# Patient Record
Sex: FEMALE | Race: BLACK OR AFRICAN AMERICAN | Employment: OTHER | URBAN - METROPOLITAN AREA
[De-identification: names, ages, dates, MRNs, and addresses within clinical notes are randomized per-mention and may not be internally consistent; named-entity substitution may affect disease eponyms.]

---

## 2018-01-24 ENCOUNTER — APPOINTMENT (OUTPATIENT)
Dept: GENERAL RADIOLOGY | Facility: HOSPITAL | Age: 63
DRG: 207 | End: 2018-01-24
Attending: INTERNAL MEDICINE
Payer: MEDICARE

## 2018-01-24 ENCOUNTER — APPOINTMENT (OUTPATIENT)
Dept: GENERAL RADIOLOGY | Facility: HOSPITAL | Age: 63
DRG: 207 | End: 2018-01-24
Payer: MEDICARE

## 2018-01-24 ENCOUNTER — HOSPITAL ENCOUNTER (INPATIENT)
Facility: HOSPITAL | Age: 63
LOS: 8 days | Discharge: NURSING, REHAB OR LONG-TERM CARE | DRG: 207 | End: 2018-02-01
Attending: EMERGENCY MEDICINE | Admitting: HOSPITALIST
Payer: MEDICARE

## 2018-01-24 ENCOUNTER — APPOINTMENT (OUTPATIENT)
Dept: CT IMAGING | Facility: HOSPITAL | Age: 63
DRG: 207 | End: 2018-01-24
Attending: INTERNAL MEDICINE
Payer: MEDICARE

## 2018-01-24 ENCOUNTER — APPOINTMENT (OUTPATIENT)
Dept: GENERAL RADIOLOGY | Facility: HOSPITAL | Age: 63
DRG: 207 | End: 2018-01-24
Attending: NURSE PRACTITIONER
Payer: MEDICARE

## 2018-01-24 ENCOUNTER — APPOINTMENT (OUTPATIENT)
Dept: GENERAL RADIOLOGY | Facility: HOSPITAL | Age: 63
DRG: 207 | End: 2018-01-24
Attending: EMERGENCY MEDICINE
Payer: MEDICARE

## 2018-01-24 DIAGNOSIS — J44.1 COPD EXACERBATION (HCC): Primary | ICD-10-CM

## 2018-01-24 DIAGNOSIS — R00.0 SINUS TACHYCARDIA: ICD-10-CM

## 2018-01-24 DIAGNOSIS — J11.1 INFLUENZA: ICD-10-CM

## 2018-01-24 PROBLEM — E87.1 HYPONATREMIA: Status: ACTIVE | Noted: 2018-01-24

## 2018-01-24 PROBLEM — E87.2 RESPIRATORY ACIDOSIS: Status: ACTIVE | Noted: 2018-01-24

## 2018-01-24 LAB
ADENOVIRUS PCR:: NEGATIVE
ALBUMIN SERPL-MCNC: 4 G/DL (ref 3.5–4.8)
ALLENS TEST: POSITIVE
ALP LIVER SERPL-CCNC: 64 U/L (ref 46–118)
ALT SERPL-CCNC: 27 U/L (ref 14–54)
APTT PPP: 20.2 SECONDS (ref 25–34)
ARTERIAL BLD GAS O2 SATURATION: 96 % (ref 92–100)
ARTERIAL BLD GAS O2 SATURATION: 97 % (ref 92–100)
ARTERIAL BLD GAS O2 SATURATION: 98 % (ref 92–100)
ARTERIAL BLOOD GAS BASE EXCESS: -0.8
ARTERIAL BLOOD GAS BASE EXCESS: -1.3
ARTERIAL BLOOD GAS BASE EXCESS: 0.8
ARTERIAL BLOOD GAS HCO3: 25 MEQ/L (ref 22–26)
ARTERIAL BLOOD GAS HCO3: 27 MEQ/L (ref 22–26)
ARTERIAL BLOOD GAS HCO3: 27.6 MEQ/L (ref 22–26)
ARTERIAL BLOOD GAS PCO2: 46 MM HG (ref 35–45)
ARTERIAL BLOOD GAS PCO2: 49 MM HG (ref 35–45)
ARTERIAL BLOOD GAS PCO2: 65 MM HG (ref 35–45)
ARTERIAL BLOOD GAS PH: 7.25 (ref 7.35–7.45)
ARTERIAL BLOOD GAS PH: 7.36 (ref 7.35–7.45)
ARTERIAL BLOOD GAS PH: 7.36 (ref 7.35–7.45)
ARTERIAL BLOOD GAS PO2: 118 MM HG (ref 80–105)
ARTERIAL BLOOD GAS PO2: 147 MM HG (ref 80–105)
ARTERIAL BLOOD GAS PO2: >574 MM HG (ref 80–105)
AST SERPL-CCNC: 29 U/L (ref 15–41)
ATRIAL RATE: 158 BPM
B PERT DNA SPEC QL NAA+PROBE: NEGATIVE
BASOPHILS # BLD AUTO: 0.02 X10(3) UL (ref 0–0.1)
BASOPHILS NFR BLD AUTO: 0.2 %
BILIRUB SERPL-MCNC: 0.5 MG/DL (ref 0.1–2)
BUN BLD-MCNC: 18 MG/DL (ref 8–20)
C PNEUM DNA SPEC QL NAA+PROBE: NEGATIVE
CALCIUM BLD-MCNC: 8.7 MG/DL (ref 8.3–10.3)
CALCULATED O2 SATURATION: 100 % (ref 92–100)
CALCULATED O2 SATURATION: 98 % (ref 92–100)
CALCULATED O2 SATURATION: 99 % (ref 92–100)
CARBOXYHEMOGLOBIN: 0.8 % SAT (ref 0–3)
CARBOXYHEMOGLOBIN: 0.8 % SAT (ref 0–3)
CARBOXYHEMOGLOBIN: 0.9 % SAT (ref 0–3)
CHLORIDE: 99 MMOL/L (ref 101–111)
CO2: 28 MMOL/L (ref 22–32)
CORONAVIRUS 229E PCR:: NEGATIVE
CORONAVIRUS HKU1 PCR:: NEGATIVE
CORONAVIRUS NL63 PCR:: NEGATIVE
CORONAVIRUS OC43 PCR:: NEGATIVE
CREAT BLD-MCNC: 0.96 MG/DL (ref 0.55–1.02)
EOSINOPHIL # BLD AUTO: 0.01 X10(3) UL (ref 0–0.3)
EOSINOPHIL NFR BLD AUTO: 0.1 %
ERYTHROCYTE [DISTWIDTH] IN BLOOD BY AUTOMATED COUNT: 13.9 % (ref 11.5–16)
EXPIRATORY PRESSURE: 5 CM H2O
EXPIRATORY PRESSURE: 6 CM H2O
FIO2: 100 %
FIO2: 40 %
FIO2: 40 %
FLUAV H3 RNA SPEC QL NAA+PROBE: POSITIVE
FLUBV RNA SPEC QL NAA+PROBE: NEGATIVE
GLUCOSE BLD-MCNC: 113 MG/DL (ref 70–99)
GLUCOSE BLD-MCNC: 161 MG/DL (ref 65–99)
GLUCOSE BLD-MCNC: 165 MG/DL (ref 65–99)
HCT VFR BLD AUTO: 44 % (ref 34–50)
HGB BLD-MCNC: 14.9 G/DL (ref 12–16)
IMMATURE GRANULOCYTE COUNT: 0.13 X10(3) UL (ref 0–1)
IMMATURE GRANULOCYTE RATIO %: 1.1 %
INR BLD: 1.11 (ref 0.89–1.11)
INSP PRESSURE: 12 CM H2O
INSP PRESSURE: 12 CM H2O
IONIZED CALCIUM: 1.2 MMOL/L (ref 1.12–1.32)
IONIZED CALCIUM: 1.22 MMOL/L (ref 1.12–1.32)
LACTIC ACID ARTERIAL: 3.5 MMOL/L (ref 0.5–2)
LACTIC ACID ARTERIAL: 3.7 MMOL/L (ref 0.5–2)
LACTIC ACID ARTERIAL: <1.3 MMOL/L (ref 0.5–2)
LACTIC ACID: 1.1 MMOL/L (ref 0.5–2)
LACTIC ACID: 3 MMOL/L (ref 0.5–2)
LACTIC ACID: 4 MMOL/L (ref 0.5–2)
LIPASE: 75 U/L (ref 73–393)
LYMPHOCYTES # BLD AUTO: 0.42 X10(3) UL (ref 0.9–4)
LYMPHOCYTES NFR BLD AUTO: 3.5 %
M PROTEIN MFR SERPL ELPH: 7.8 G/DL (ref 6.1–8.3)
MCH RBC QN AUTO: 31 PG (ref 27–33.2)
MCHC RBC AUTO-ENTMCNC: 33.9 G/DL (ref 31–37)
MCV RBC AUTO: 91.5 FL (ref 81–100)
METAPNEUMOVIRUS PCR:: NEGATIVE
METHEMOGLOBIN: 0.7 % SAT (ref 0.4–1.5)
METHEMOGLOBIN: 0.7 % SAT (ref 0.4–1.5)
METHEMOGLOBIN: 0.8 % SAT (ref 0.4–1.5)
MONOCYTES # BLD AUTO: 0.68 X10(3) UL (ref 0.1–0.6)
MONOCYTES NFR BLD AUTO: 5.6 %
MYCOPLASMA PNEUMONIA PCR:: NEGATIVE
NEUTROPHIL ABS PRELIM: 10.91 X10 (3) UL (ref 1.3–6.7)
NEUTROPHILS # BLD AUTO: 10.91 X10(3) UL (ref 1.3–6.7)
NEUTROPHILS NFR BLD AUTO: 89.5 %
P AXIS: 74 DEGREES
P-R INTERVAL: 118 MS
P/F RATIO: 711 MMHG
PARAINFLUENZA 1 PCR:: NEGATIVE
PARAINFLUENZA 2 PCR:: NEGATIVE
PARAINFLUENZA 3 PCR:: NEGATIVE
PARAINFLUENZA 4 PCR:: NEGATIVE
PATIENT TEMPERATURE: 98 F
PATIENT TEMPERATURE: 98.3 F
PATIENT TEMPERATURE: 98.3 F
PEEP: 5 CM H2O
PLATELET # BLD AUTO: 296 10(3)UL (ref 150–450)
POTASSIUM BLOOD GAS: 4.1 MMOL/L (ref 3.6–5.1)
POTASSIUM BLOOD GAS: 4.5 MMOL/L (ref 3.6–5.1)
POTASSIUM SERPL-SCNC: 4 MMOL/L (ref 3.6–5.1)
PRO-BETA NATRIURETIC PEPTIDE: 60 PG/ML (ref ?–125)
PSA SERPL DL<=0.01 NG/ML-MCNC: 14.3 SECONDS (ref 12–14.3)
Q-T INTERVAL: 244 MS
QRS DURATION: 70 MS
QTC CALCULATION (BEZET): 395 MS
R AXIS: 66 DEGREES
RBC # BLD AUTO: 4.81 X10(6)UL (ref 3.8–5.1)
RED CELL DISTRIBUTION WIDTH-SD: 46.5 FL (ref 35.1–46.3)
RHINOVIRUS/ENTERO PCR:: NEGATIVE
RSV RNA SPEC QL NAA+PROBE: NEGATIVE
SODIUM BLOOD GAS: 138 MMOL/L (ref 136–144)
SODIUM BLOOD GAS: 140 MMOL/L (ref 136–144)
SODIUM SERPL-SCNC: 135 MMOL/L (ref 136–144)
T AXIS: 70 DEGREES
TIDAL VOLUME: 550 ML
TOTAL HEMOGLOBIN: 13.3 G/DL (ref 11.7–16)
TOTAL HEMOGLOBIN: 14.5 G/DL (ref 11.7–16)
TOTAL HEMOGLOBIN: 14.7 G/DL (ref 11.7–16)
TRIGL SERPL-MCNC: 93 MG/DL (ref ?–150)
TROPONIN: <0.046 NG/ML (ref ?–0.05)
VENT RATE: 16 /MIN
VENTRICULAR RATE: 158 BPM
WBC # BLD AUTO: 12.2 X10(3) UL (ref 4–13)

## 2018-01-24 PROCEDURE — 71275 CT ANGIOGRAPHY CHEST: CPT | Performed by: INTERNAL MEDICINE

## 2018-01-24 PROCEDURE — 5A1955Z RESPIRATORY VENTILATION, GREATER THAN 96 CONSECUTIVE HOURS: ICD-10-PCS | Performed by: ANESTHESIOLOGY

## 2018-01-24 PROCEDURE — B548ZZA ULTRASONOGRAPHY OF SUPERIOR VENA CAVA, GUIDANCE: ICD-10-PCS | Performed by: HOSPITALIST

## 2018-01-24 PROCEDURE — 0BH18EZ INSERTION OF ENDOTRACHEAL AIRWAY INTO TRACHEA, VIA NATURAL OR ARTIFICIAL OPENING ENDOSCOPIC: ICD-10-PCS | Performed by: ANESTHESIOLOGY

## 2018-01-24 PROCEDURE — 0D9670Z DRAINAGE OF STOMACH WITH DRAINAGE DEVICE, VIA NATURAL OR ARTIFICIAL OPENING: ICD-10-PCS | Performed by: HOSPITALIST

## 2018-01-24 PROCEDURE — 5A09357 ASSISTANCE WITH RESPIRATORY VENTILATION, LESS THAN 24 CONSECUTIVE HOURS, CONTINUOUS POSITIVE AIRWAY PRESSURE: ICD-10-PCS | Performed by: EMERGENCY MEDICINE

## 2018-01-24 PROCEDURE — 99223 1ST HOSP IP/OBS HIGH 75: CPT | Performed by: HOSPITALIST

## 2018-01-24 PROCEDURE — 71045 X-RAY EXAM CHEST 1 VIEW: CPT | Performed by: EMERGENCY MEDICINE

## 2018-01-24 PROCEDURE — 02HV33Z INSERTION OF INFUSION DEVICE INTO SUPERIOR VENA CAVA, PERCUTANEOUS APPROACH: ICD-10-PCS | Performed by: HOSPITALIST

## 2018-01-24 PROCEDURE — 74018 RADEX ABDOMEN 1 VIEW: CPT | Performed by: INTERNAL MEDICINE

## 2018-01-24 PROCEDURE — 74177 CT ABD & PELVIS W/CONTRAST: CPT | Performed by: INTERNAL MEDICINE

## 2018-01-24 PROCEDURE — 71045 X-RAY EXAM CHEST 1 VIEW: CPT | Performed by: INTERNAL MEDICINE

## 2018-01-24 RX ORDER — SODIUM CHLORIDE 9 MG/ML
INJECTION, SOLUTION INTRAVENOUS CONTINUOUS
Status: DISCONTINUED | OUTPATIENT
Start: 2018-01-24 | End: 2018-01-27

## 2018-01-24 RX ORDER — IPRATROPIUM BROMIDE AND ALBUTEROL SULFATE 2.5; .5 MG/3ML; MG/3ML
3 SOLUTION RESPIRATORY (INHALATION)
Status: DISCONTINUED | OUTPATIENT
Start: 2018-01-24 | End: 2018-01-24

## 2018-01-24 RX ORDER — IPRATROPIUM BROMIDE AND ALBUTEROL SULFATE 2.5; .5 MG/3ML; MG/3ML
3 SOLUTION RESPIRATORY (INHALATION) ONCE
Status: COMPLETED | OUTPATIENT
Start: 2018-01-24 | End: 2018-01-24

## 2018-01-24 RX ORDER — ACETAMINOPHEN 325 MG/1
650 TABLET ORAL EVERY 6 HOURS PRN
Status: DISCONTINUED | OUTPATIENT
Start: 2018-01-24 | End: 2018-01-29

## 2018-01-24 RX ORDER — SODIUM CHLORIDE 9 MG/ML
INJECTION, SOLUTION INTRAVENOUS ONCE
Status: COMPLETED | OUTPATIENT
Start: 2018-01-24 | End: 2018-01-24

## 2018-01-24 RX ORDER — ONDANSETRON 2 MG/ML
4 INJECTION INTRAMUSCULAR; INTRAVENOUS EVERY 4 HOURS PRN
Status: DISCONTINUED | OUTPATIENT
Start: 2018-01-24 | End: 2018-01-28 | Stop reason: ALTCHOICE

## 2018-01-24 RX ORDER — MIDAZOLAM HYDROCHLORIDE 1 MG/ML
INJECTION INTRAMUSCULAR; INTRAVENOUS
Status: COMPLETED
Start: 2018-01-24 | End: 2018-01-24

## 2018-01-24 RX ORDER — CHLORHEXIDINE GLUCONATE 0.12 MG/ML
15 RINSE ORAL
Status: DISCONTINUED | OUTPATIENT
Start: 2018-01-24 | End: 2018-01-30

## 2018-01-24 RX ORDER — SODIUM CHLORIDE 0.9 % (FLUSH) 0.9 %
10 SYRINGE (ML) INJECTION AS NEEDED
Status: DISCONTINUED | OUTPATIENT
Start: 2018-01-24 | End: 2018-02-01

## 2018-01-24 RX ORDER — METHYLPREDNISOLONE SODIUM SUCCINATE 125 MG/2ML
125 INJECTION, POWDER, LYOPHILIZED, FOR SOLUTION INTRAMUSCULAR; INTRAVENOUS ONCE
Status: COMPLETED | OUTPATIENT
Start: 2018-01-24 | End: 2018-01-24

## 2018-01-24 RX ORDER — POLYETHYLENE GLYCOL 3350 17 G/17G
17 POWDER, FOR SOLUTION ORAL DAILY PRN
Status: DISCONTINUED | OUTPATIENT
Start: 2018-01-24 | End: 2018-01-30

## 2018-01-24 RX ORDER — GABAPENTIN 100 MG/1
100 CAPSULE ORAL 3 TIMES DAILY
Status: DISCONTINUED | OUTPATIENT
Start: 2018-01-24 | End: 2018-02-01

## 2018-01-24 RX ORDER — ONDANSETRON 2 MG/ML
4 INJECTION INTRAMUSCULAR; INTRAVENOUS EVERY 6 HOURS PRN
Status: DISCONTINUED | OUTPATIENT
Start: 2018-01-24 | End: 2018-02-01

## 2018-01-24 RX ORDER — SODIUM PHOSPHATE, DIBASIC AND SODIUM PHOSPHATE, MONOBASIC 7; 19 G/133ML; G/133ML
1 ENEMA RECTAL ONCE AS NEEDED
Status: DISCONTINUED | OUTPATIENT
Start: 2018-01-24 | End: 2018-01-30

## 2018-01-24 RX ORDER — LORAZEPAM 2 MG/ML
INJECTION INTRAMUSCULAR
Status: COMPLETED
Start: 2018-01-24 | End: 2018-01-24

## 2018-01-24 RX ORDER — MORPHINE SULFATE 2 MG/ML
4 INJECTION, SOLUTION INTRAMUSCULAR; INTRAVENOUS ONCE
Status: COMPLETED | OUTPATIENT
Start: 2018-01-24 | End: 2018-01-24

## 2018-01-24 RX ORDER — METOPROLOL TARTRATE 5 MG/5ML
5 INJECTION INTRAVENOUS ONCE
Status: COMPLETED | OUTPATIENT
Start: 2018-01-24 | End: 2018-01-24

## 2018-01-24 RX ORDER — AMLODIPINE BESYLATE 5 MG/1
10 TABLET ORAL DAILY
Status: DISCONTINUED | OUTPATIENT
Start: 2018-01-24 | End: 2018-02-01

## 2018-01-24 RX ORDER — MIDAZOLAM HYDROCHLORIDE 1 MG/ML
INJECTION INTRAMUSCULAR; INTRAVENOUS
Status: DISPENSED
Start: 2018-01-24 | End: 2018-01-25

## 2018-01-24 RX ORDER — MIDAZOLAM HYDROCHLORIDE 1 MG/ML
1 INJECTION INTRAMUSCULAR; INTRAVENOUS ONCE
Status: COMPLETED | OUTPATIENT
Start: 2018-01-24 | End: 2018-01-24

## 2018-01-24 RX ORDER — BISACODYL 10 MG
10 SUPPOSITORY, RECTAL RECTAL
Status: DISCONTINUED | OUTPATIENT
Start: 2018-01-24 | End: 2018-01-24

## 2018-01-24 RX ORDER — IPRATROPIUM BROMIDE AND ALBUTEROL SULFATE 2.5; .5 MG/3ML; MG/3ML
SOLUTION RESPIRATORY (INHALATION)
Status: COMPLETED
Start: 2018-01-24 | End: 2018-01-24

## 2018-01-24 RX ORDER — BISACODYL 10 MG
10 SUPPOSITORY, RECTAL RECTAL
Status: DISCONTINUED | OUTPATIENT
Start: 2018-01-24 | End: 2018-02-01

## 2018-01-24 RX ORDER — IPRATROPIUM BROMIDE AND ALBUTEROL SULFATE 2.5; .5 MG/3ML; MG/3ML
3 SOLUTION RESPIRATORY (INHALATION)
Status: DISCONTINUED | OUTPATIENT
Start: 2018-01-24 | End: 2018-01-27

## 2018-01-24 RX ORDER — METHYLPREDNISOLONE SODIUM SUCCINATE 125 MG/2ML
80 INJECTION, POWDER, LYOPHILIZED, FOR SOLUTION INTRAMUSCULAR; INTRAVENOUS EVERY 8 HOURS
Status: DISCONTINUED | OUTPATIENT
Start: 2018-01-24 | End: 2018-01-31

## 2018-01-24 RX ORDER — DOCUSATE SODIUM 100 MG/1
100 CAPSULE, LIQUID FILLED ORAL 2 TIMES DAILY
Status: DISCONTINUED | OUTPATIENT
Start: 2018-01-24 | End: 2018-01-25

## 2018-01-24 RX ORDER — ACETAMINOPHEN 325 MG/1
650 TABLET ORAL EVERY 6 HOURS PRN
Status: DISCONTINUED | OUTPATIENT
Start: 2018-01-24 | End: 2018-01-28

## 2018-01-24 RX ORDER — ALBUTEROL SULFATE 2.5 MG/3ML
SOLUTION RESPIRATORY (INHALATION)
Status: COMPLETED
Start: 2018-01-24 | End: 2018-01-24

## 2018-01-24 RX ORDER — OSELTAMIVIR PHOSPHATE 6 MG/ML
75 FOR SUSPENSION ORAL EVERY 12 HOURS
Status: DISCONTINUED | OUTPATIENT
Start: 2018-01-24 | End: 2018-01-25

## 2018-01-24 RX ORDER — OSELTAMIVIR PHOSPHATE 75 MG/1
75 CAPSULE ORAL 2 TIMES DAILY
Status: DISCONTINUED | OUTPATIENT
Start: 2018-01-24 | End: 2018-01-24

## 2018-01-24 RX ORDER — ETOMIDATE 2 MG/ML
INJECTION INTRAVENOUS
Status: COMPLETED
Start: 2018-01-24 | End: 2018-01-24

## 2018-01-24 RX ORDER — IPRATROPIUM BROMIDE AND ALBUTEROL SULFATE 2.5; .5 MG/3ML; MG/3ML
3 SOLUTION RESPIRATORY (INHALATION) EVERY 4 HOURS
Status: DISCONTINUED | OUTPATIENT
Start: 2018-01-24 | End: 2018-01-24

## 2018-01-24 RX ORDER — SODIUM CHLORIDE 9 MG/ML
INJECTION, SOLUTION INTRAVENOUS CONTINUOUS
Status: ACTIVE | OUTPATIENT
Start: 2018-01-24 | End: 2018-01-24

## 2018-01-24 RX ORDER — ACETAMINOPHEN 650 MG/1
650 SUPPOSITORY RECTAL EVERY 6 HOURS PRN
Status: DISCONTINUED | OUTPATIENT
Start: 2018-01-24 | End: 2018-01-29

## 2018-01-24 RX ORDER — SODIUM PHOSPHATE, DIBASIC AND SODIUM PHOSPHATE, MONOBASIC 7; 19 G/133ML; G/133ML
1 ENEMA RECTAL ONCE AS NEEDED
Status: DISCONTINUED | OUTPATIENT
Start: 2018-01-24 | End: 2018-01-24

## 2018-01-24 RX ORDER — DOCUSATE SODIUM 100 MG/1
100 CAPSULE, LIQUID FILLED ORAL 2 TIMES DAILY
Status: DISCONTINUED | OUTPATIENT
Start: 2018-01-24 | End: 2018-01-24

## 2018-01-24 RX ORDER — MIDAZOLAM HYDROCHLORIDE 1 MG/ML
2 INJECTION INTRAMUSCULAR; INTRAVENOUS ONCE
Status: COMPLETED | OUTPATIENT
Start: 2018-01-24 | End: 2018-01-24

## 2018-01-24 RX ORDER — POLYETHYLENE GLYCOL 3350 17 G/17G
17 POWDER, FOR SOLUTION ORAL DAILY PRN
Status: DISCONTINUED | OUTPATIENT
Start: 2018-01-24 | End: 2018-01-24

## 2018-01-24 RX ORDER — ENOXAPARIN SODIUM 100 MG/ML
40 INJECTION SUBCUTANEOUS DAILY
Status: DISCONTINUED | OUTPATIENT
Start: 2018-01-24 | End: 2018-02-01

## 2018-01-24 RX ORDER — ACETAMINOPHEN 160 MG/5ML
650 SOLUTION ORAL EVERY 6 HOURS PRN
Status: DISCONTINUED | OUTPATIENT
Start: 2018-01-24 | End: 2018-01-29

## 2018-01-24 RX ORDER — PANTOPRAZOLE SODIUM 40 MG/1
40 TABLET, DELAYED RELEASE ORAL
Status: DISCONTINUED | OUTPATIENT
Start: 2018-01-25 | End: 2018-01-25

## 2018-01-24 RX ORDER — ASPIRIN 325 MG
325 TABLET, DELAYED RELEASE (ENTERIC COATED) ORAL DAILY
Status: DISCONTINUED | OUTPATIENT
Start: 2018-01-24 | End: 2018-01-28

## 2018-01-24 RX ORDER — LISINOPRIL 40 MG/1
40 TABLET ORAL DAILY
Status: DISCONTINUED | OUTPATIENT
Start: 2018-01-24 | End: 2018-02-01

## 2018-01-24 RX ORDER — MIDAZOLAM HYDROCHLORIDE 1 MG/ML
2 INJECTION INTRAMUSCULAR; INTRAVENOUS EVERY 5 MIN PRN
Status: DISCONTINUED | OUTPATIENT
Start: 2018-01-24 | End: 2018-01-30

## 2018-01-24 RX ORDER — MONTELUKAST SODIUM 10 MG/1
10 TABLET ORAL NIGHTLY
Status: DISCONTINUED | OUTPATIENT
Start: 2018-01-24 | End: 2018-02-01

## 2018-01-24 NOTE — ED PROVIDER NOTES
Patient Seen in: [facility] Emergency Department    History   Patient presents with:  Dyspnea BARBARA SOB (respiratory)    Stated Complaint: resp distress    HPI    Patient is a 28-year-old female, with history of COPD, presenting for evaluation of coug trauma. Extraocular muscles are intact. Pupils are equal and reactive to light. Oropharynx is pink and moist.  NECK: Neck is supple and nontender. The trachea is midline. LUNGS: Diminished breath sounds, bilaterally, expiratory wheezing throughout.   Tach Absolute 10.91 (*)     Lymphocyte Absolute 0.42 (*)     Monocyte Absolute 0.68 (*)     All other components within normal limits   LACTIC ACID, PLASMA - Normal   PRO BETA NATRIURETIC PEPTIDE - Normal   TROPONIN I - Normal   PROTHROMBIN TIME (PT) - Normal evaluation. The patient was attached to a cardiac monitor. An EKG was obtained and reviewed as noted above. Patient was observed while the laboratory and radiology studies were obtained. IV Solu-Medrol and DuoNeb was ordered.   Patient was placed on BiPA

## 2018-01-24 NOTE — PROGRESS NOTES
Arrived to ICU room 467  HR 140s  Lactic 3.0  Dr. Ashley Mo at bedside  Radiology notified to come do KUB    navigator completed with difficulty. Patient easily tires and declined to answer some questions.      Patient states she was at [address] [facility] ye

## 2018-01-24 NOTE — H&P
[facility]  History and Physical     Mya Anderson Patient Status:  Emergency    1955 MRN [id number]   Location [address] [facility] Attending Vic Rivera MD   Hosp Day # 0 PCP Unknown Pcp     Chief Complaint: Resp daily as needed for Muscle spasms. Disp:  Rfl:    DiphenhydrAMINE HCl 50 MG Oral Cap Take 50 mg by mouth nightly as needed for Itching. Disp:  Rfl:    Fluticasone Propionate 50 MCG/ACT Nasal Suspension 2 sprays by Each Nare route daily.    Disp:  Rfl:    Me nondistended. Positive bowel sounds. No rebound, guarding or organomegaly. Neurologic: No focal neurological deficits. CNII-XII grossly intact. Musculoskeletal: Moves all extremities. Extremities: No edema or cyanosis.   Integument: No rashes or lesions

## 2018-01-24 NOTE — PROGRESS NOTES
[facility]    Patients Name: Stacy Vasquez  Attending Physician: Gamaliel Olvera MD  CSN: [id number]   Location:  467/467-A  MRN: [id number]    YOB: 1955  Admission Date: 1/24/2018     Intubation    Called to Intubate Patient.     Ind

## 2018-01-24 NOTE — CONSULTS
Pulmonary / Critical Care H&P/Consult       NAME: [address] [place] [address] Street: [id number] - MRN: [id number] - Age: 63year old - :  1955    Date of Admission: 2018  6:20 AM  Admission Diagnosis: Sinus tachycardia [R00.0]  Influenza [J11.1]  COPD Oral Daily   • Montelukast Sodium  10 mg Oral Nightly   • [START ON 1/25/2018] Pantoprazole Sodium  40 mg Oral QAM AC   • enoxaparin  40 mg Subcutaneous Daily   • ipratropium-albuterol  3 mL Nebulization Q4H   • MethylPREDNISolone Sodium Succ  80 mg [place] Skin:   Skin color, texture, turgor normal, no rashes or lesions         Recent Labs   Lab  01/24/18 0638   WBC  12.2   HGB  14.9   HCT  44.0   PLT  296.0     Recent Labs   Lab  01/24/18 0638   INR  1.11         Recent Labs   Lab  01/24/18 0638   N

## 2018-01-24 NOTE — PLAN OF CARE
Diabetes/Glucose Control    • Glucose maintained within prescribed range Not Progressing        GASTROINTESTINAL - ADULT    • Minimal or absence of nausea and vomiting Not Progressing    • Maintains or returns to baseline bowel function Not Progressing

## 2018-01-24 NOTE — PLAN OF CARE
Received patient from ER sitting up on cart, stating she can't breath. Respirations 30 and labored. Expiratory wheezing noted. sats 84% on 4 liters. Blood pressure 127/104. Call to respiratory and pulmonary.  Plan to transfer to ICU

## 2018-01-24 NOTE — ED NOTES
Patient updated on status. Tachypnea noted. Bi-pap in process. Pt c/o cough, coughing up yellow white mucous. C/o abd pain, RLQ. Will continue to monitor.

## 2018-01-24 NOTE — ED NOTES
Respiratory to come and assess oxygenation. Reduce to NC per MD request. Will continue to monitor. Pt comfortable. Resting.

## 2018-01-25 ENCOUNTER — APPOINTMENT (OUTPATIENT)
Dept: CV DIAGNOSTICS | Facility: HOSPITAL | Age: 63
DRG: 207 | End: 2018-01-25
Attending: NURSE PRACTITIONER
Payer: MEDICARE

## 2018-01-25 ENCOUNTER — APPOINTMENT (OUTPATIENT)
Dept: GENERAL RADIOLOGY | Facility: HOSPITAL | Age: 63
DRG: 207 | End: 2018-01-25
Attending: NURSE PRACTITIONER
Payer: MEDICARE

## 2018-01-25 ENCOUNTER — APPOINTMENT (OUTPATIENT)
Dept: GENERAL RADIOLOGY | Facility: HOSPITAL | Age: 63
DRG: 207 | End: 2018-01-25
Attending: INTERNAL MEDICINE
Payer: MEDICARE

## 2018-01-25 LAB
ARTERIAL BLD GAS O2 SATURATION: 97 % (ref 92–100)
ARTERIAL BLOOD GAS BASE EXCESS: 2
ARTERIAL BLOOD GAS HCO3: 27.7 MEQ/L (ref 22–26)
ARTERIAL BLOOD GAS PCO2: 47 MM HG (ref 35–45)
ARTERIAL BLOOD GAS PH: 7.39 (ref 7.35–7.45)
ARTERIAL BLOOD GAS PO2: 141 MM HG (ref 80–105)
BASOPHILS # BLD AUTO: 0 X10(3) UL (ref 0–0.1)
BASOPHILS NFR BLD AUTO: 0 %
BILIRUB UR QL STRIP.AUTO: NEGATIVE
BUN BLD-MCNC: 21 MG/DL (ref 8–20)
CALCIUM BLD-MCNC: 7.6 MG/DL (ref 8.3–10.3)
CALCULATED O2 SATURATION: 99 % (ref 92–100)
CARBOXYHEMOGLOBIN: 0.7 % SAT (ref 0–3)
CHLORIDE: 106 MMOL/L (ref 101–111)
CO2: 29 MMOL/L (ref 22–32)
COLOR UR AUTO: YELLOW
CREAT BLD-MCNC: 0.83 MG/DL (ref 0.55–1.02)
EOSINOPHIL # BLD AUTO: 0 X10(3) UL (ref 0–0.3)
EOSINOPHIL NFR BLD AUTO: 0 %
ERYTHROCYTE [DISTWIDTH] IN BLOOD BY AUTOMATED COUNT: 13.9 % (ref 11.5–16)
FIO2: 40 %
GLUCOSE BLD-MCNC: 132 MG/DL (ref 65–99)
GLUCOSE BLD-MCNC: 152 MG/DL (ref 65–99)
GLUCOSE BLD-MCNC: 156 MG/DL (ref 70–99)
GLUCOSE BLD-MCNC: 164 MG/DL (ref 65–99)
GLUCOSE BLD-MCNC: 186 MG/DL (ref 65–99)
GLUCOSE UR STRIP.AUTO-MCNC: NEGATIVE MG/DL
HAV IGM SER QL: 2.4 MG/DL (ref 1.7–3)
HCT VFR BLD AUTO: 36.2 % (ref 34–50)
HGB BLD-MCNC: 11.9 G/DL (ref 12–16)
HYALINE CASTS #/AREA URNS AUTO: PRESENT /LPF
IMMATURE GRANULOCYTE COUNT: 0.05 X10(3) UL (ref 0–1)
IMMATURE GRANULOCYTE RATIO %: 0.8 %
KETONES UR STRIP.AUTO-MCNC: NEGATIVE MG/DL
LACTIC ACID: 1.4 MMOL/L (ref 0.5–2)
LEUKOCYTE ESTERASE UR QL STRIP.AUTO: NEGATIVE
LYMPHOCYTES # BLD AUTO: 0.37 X10(3) UL (ref 0.9–4)
LYMPHOCYTES NFR BLD AUTO: 5.9 %
MCH RBC QN AUTO: 30.4 PG (ref 27–33.2)
MCHC RBC AUTO-ENTMCNC: 32.9 G/DL (ref 31–37)
MCV RBC AUTO: 92.3 FL (ref 81–100)
METHEMOGLOBIN: 0.7 % SAT (ref 0.4–1.5)
MONOCYTES # BLD AUTO: 0.41 X10(3) UL (ref 0.1–0.6)
MONOCYTES NFR BLD AUTO: 6.5 %
NEUTROPHIL ABS PRELIM: 5.44 X10 (3) UL (ref 1.3–6.7)
NEUTROPHILS # BLD AUTO: 5.44 X10(3) UL (ref 1.3–6.7)
NEUTROPHILS NFR BLD AUTO: 86.8 %
NITRITE UR QL STRIP.AUTO: NEGATIVE
PATIENT TEMPERATURE: 98.3 F
PEEP: 8 CM H2O
PH UR STRIP.AUTO: 5 [PH] (ref 4.5–8)
PHOSPHATE SERPL-MCNC: 3.5 MG/DL (ref 2.5–4.9)
PLATELET # BLD AUTO: 228 10(3)UL (ref 150–450)
POTASSIUM SERPL-SCNC: 3.4 MMOL/L (ref 3.6–5.1)
PROT UR STRIP.AUTO-MCNC: 30 MG/DL
RBC # BLD AUTO: 3.92 X10(6)UL (ref 3.8–5.1)
RBC UR QL AUTO: NEGATIVE
RED CELL DISTRIBUTION WIDTH-SD: 47.5 FL (ref 35.1–46.3)
SODIUM SERPL-SCNC: 141 MMOL/L (ref 136–144)
SP GR UR STRIP.AUTO: 1.06 (ref 1–1.03)
TIDAL VOLUME: 500 ML
TOTAL HEMOGLOBIN: 12.3 G/DL (ref 11.7–16)
UROBILINOGEN UR STRIP.AUTO-MCNC: <2 MG/DL
VENT RATE: 14 /MIN
WBC # BLD AUTO: 6.3 X10(3) UL (ref 4–13)

## 2018-01-25 PROCEDURE — 99232 SBSQ HOSP IP/OBS MODERATE 35: CPT | Performed by: HOSPITALIST

## 2018-01-25 PROCEDURE — 71045 X-RAY EXAM CHEST 1 VIEW: CPT | Performed by: NURSE PRACTITIONER

## 2018-01-25 PROCEDURE — 93306 TTE W/DOPPLER COMPLETE: CPT | Performed by: NURSE PRACTITIONER

## 2018-01-25 PROCEDURE — 74018 RADEX ABDOMEN 1 VIEW: CPT | Performed by: INTERNAL MEDICINE

## 2018-01-25 RX ORDER — OSELTAMIVIR PHOSPHATE 6 MG/ML
75 FOR SUSPENSION ORAL EVERY 12 HOURS
Status: DISCONTINUED | OUTPATIENT
Start: 2018-01-25 | End: 2018-01-29

## 2018-01-25 RX ORDER — POTASSIUM CHLORIDE 29.8 MG/ML
40 INJECTION INTRAVENOUS ONCE
Status: COMPLETED | OUTPATIENT
Start: 2018-01-25 | End: 2018-01-25

## 2018-01-25 RX ORDER — OSELTAMIVIR PHOSPHATE 6 MG/ML
150 FOR SUSPENSION ORAL EVERY 12 HOURS
Status: DISCONTINUED | OUTPATIENT
Start: 2018-01-25 | End: 2018-01-25

## 2018-01-25 RX ORDER — FUROSEMIDE 10 MG/ML
20 INJECTION INTRAMUSCULAR; INTRAVENOUS ONCE
Status: COMPLETED | OUTPATIENT
Start: 2018-01-25 | End: 2018-01-25

## 2018-01-25 NOTE — PROGRESS NOTES
[address] Patient Status:  Inpatient    1955 MRN [id number]   [facility] 4SW-A Attending Rehan Artis MD   [facility] Day # 1 PCP Unknown Pcp     Critical Care Progress Note     Date of Admission: 2018  6:20 650 mg, 650 mg, Oral, Q6H PRN **OR** acetaminophen (TYLENOL) 160 MG/5ML oral liquid 650 mg, 650 mg, Oral, Q6H PRN **OR** acetaminophen (TYLENOL) 650 MG rectal suppository 650 mg, 650 mg, Rectal, Q6H PRN  •  docusate sodium (COLACE) cap 100 mg, 100 mg, Oral 2157 ml   Output              400 ml   Net             1757 ml         Physical Exam:                          General: intubated, sedated, in NAD                          HEENT: ETT in place                          Lungs: poor air movement, diffuse ex further evaluate. 4.  Emphysematous changes. ASSESSMENT/PLAN:  1. Acute resp failure: secondary to asthma/copd exac in the setting of influenza.   CTA negative for PE and without infiltrates   - s/p intubation 1/24  - continue full mechanical venti

## 2018-01-25 NOTE — PHYSICAL THERAPY NOTE
Physical Therapy    Orders received per [facility] protocol. Pt is not appropriate for PT services yet due to tenuous medical status. Will sign off of case. Please re-order when pt is medically stable and able to participate in sessions.

## 2018-01-25 NOTE — PROGRESS NOTES
Assumed care at [address] [facility]. Sedated. Vented. CTA (-) PE. Coughing against vent. Had issues with desaturation d/t coughing from neb treatment -- APN notified. After coughing/fighting vent a couple times APN stated to keep going up on sedation.   Jaya Mask

## 2018-01-25 NOTE — PROGRESS NOTES
Xr Abdomen (kub) (1 Ap View)  (cpt=74018)    Result Date: 1/24/2018  CONCLUSION:  Gaseous distention of bowel loops within the central abdomen. Findings may represent ileus versus partial small bowel obstruction.     Dictated by: Nancy Moser MD on 1 Wheezes    Peripheral Pulses:  Radial: Right 1+ or Left 1+      Capillary Refill:  <3 Secs    Skin:  Temp/Moisture: Warm and Dry  Color: Pink      Jeison Cabrera  1/24/2018  9:45 PM      0600 addendum:  Pt not getting the full TV on the vent.   More agita

## 2018-01-25 NOTE — RESPIRATORY THERAPY NOTE
Patient on full support vent changes made this am abg's done post. 14/500/40%/+8 tolerating well. Q2 nebs Diminished/Expiratory wheezing. Will continue to monitor.

## 2018-01-25 NOTE — PROGRESS NOTES
[facility]  Progress Note     Spicer Fitting Patient Status:  Inpatient    1955 MRN [id number]   [facility] 4SW-A Attending Deanna Taylor MD   Hosp Day # 1 PCP Unknown Pcp     Chief Complaint: Dyspnea    S: Patient intubate Daily   • gabapentin  100 mg Oral TID   • lisinopril  40 mg Oral Daily   • Montelukast Sodium  10 mg Oral Nightly   • enoxaparin  40 mg Subcutaneous Daily   • MethylPREDNISolone Sodium Succ  80 mg Intravenous Q8H   • ipratropium-albuterol  3 mL Nebulizatio

## 2018-01-25 NOTE — PLAN OF CARE
Received pt on ventilator, propofol and fentanyl infusing. Sedation weaned throughout day as pt's condition allows, per Dr. Demetri Alvarado. After repositioning, pt coughing and fighting ventilator; fentanyl bolus given.     Dr. Grant Green notified this AM of dark br

## 2018-01-25 NOTE — CONSULTS
INFECTIOUS DISEASE CONSULTATION    Nayan Esparza Patient Status:  Inpatient    1955 MRN [id number]   [facility] 4SW-A Attending Deanna Taylor MD   Hosp Day # 1 PCP Unknown Pcp 40 mg, Oral, Daily  •  Montelukast Sodium (SINGULAIR) tab 10 mg, 10 mg, Oral, Nightly  •  acetaminophen (TYLENOL) tab 650 mg, 650 mg, Oral, Q6H PRN  •  Enoxaparin Sodium (LOVENOX) 40 MG/0.4ML injection 40 mg, 40 mg, Subcutaneous, Daily  •  MethylPREDNISolo sodium chloride 0.9 % 50 mL infusion for shock, 0.04 Units/min, Intravenous, Continuous PRN  •  CefTRIAXone Sodium (ROCEPHIN) 2 g in sodium chloride 0.9 % 100 mL IVPB, 2 g, Intravenous, Q24H    Review of Systems:    A comprehensive 10 point review of syste 0700   Specimen: Blood from Blood,peripheral     Blood Culture Result No Growth 1 Day   Respiratory Panel FLU expanded Once [[id number]] (Abnormal) Collected: 01/24/18 0652   Order Status: Completed Lab Status: Final result Updated: 01/24/18 0921   Specimen

## 2018-01-26 ENCOUNTER — APPOINTMENT (OUTPATIENT)
Dept: GENERAL RADIOLOGY | Facility: HOSPITAL | Age: 63
DRG: 207 | End: 2018-01-26
Attending: NURSE PRACTITIONER
Payer: MEDICARE

## 2018-01-26 LAB
ALBUMIN SERPL-MCNC: 2.7 G/DL (ref 3.5–4.8)
ALP LIVER SERPL-CCNC: 35 U/L (ref 46–118)
ALT SERPL-CCNC: 20 U/L (ref 14–54)
ARTERIAL BLD GAS O2 SATURATION: 97 % (ref 92–100)
ARTERIAL BLOOD GAS BASE EXCESS: 2.1
ARTERIAL BLOOD GAS HCO3: 28.1 MEQ/L (ref 22–26)
ARTERIAL BLOOD GAS PCO2: 49 MM HG (ref 35–45)
ARTERIAL BLOOD GAS PH: 7.38 (ref 7.35–7.45)
ARTERIAL BLOOD GAS PO2: 138 MM HG (ref 80–105)
AST SERPL-CCNC: 23 U/L (ref 15–41)
BASOPHILS # BLD AUTO: 0.02 X10(3) UL (ref 0–0.1)
BASOPHILS # BLD AUTO: 0.02 X10(3) UL (ref 0–0.1)
BASOPHILS NFR BLD AUTO: 0.2 %
BASOPHILS NFR BLD AUTO: 0.2 %
BILIRUB SERPL-MCNC: 0.1 MG/DL (ref 0.1–2)
BUN BLD-MCNC: 18 MG/DL (ref 8–20)
CALCIUM BLD-MCNC: 7.5 MG/DL (ref 8.3–10.3)
CALCULATED O2 SATURATION: 99 % (ref 92–100)
CARBOXYHEMOGLOBIN: 0.8 % SAT (ref 0–3)
CHLORIDE: 113 MMOL/L (ref 101–111)
CO2: 28 MMOL/L (ref 22–32)
CREAT BLD-MCNC: 0.64 MG/DL (ref 0.55–1.02)
EOSINOPHIL # BLD AUTO: 0 X10(3) UL (ref 0–0.3)
EOSINOPHIL # BLD AUTO: 0 X10(3) UL (ref 0–0.3)
EOSINOPHIL NFR BLD AUTO: 0 %
EOSINOPHIL NFR BLD AUTO: 0 %
ERYTHROCYTE [DISTWIDTH] IN BLOOD BY AUTOMATED COUNT: 14.5 % (ref 11.5–16)
ERYTHROCYTE [DISTWIDTH] IN BLOOD BY AUTOMATED COUNT: 14.7 % (ref 11.5–16)
FIO2: 40 %
GLUCOSE BLD-MCNC: 111 MG/DL (ref 65–99)
GLUCOSE BLD-MCNC: 114 MG/DL (ref 65–99)
GLUCOSE BLD-MCNC: 135 MG/DL (ref 70–99)
GLUCOSE BLD-MCNC: 148 MG/DL (ref 65–99)
HCT VFR BLD AUTO: 34.1 % (ref 34–50)
HCT VFR BLD AUTO: 34.1 % (ref 34–50)
HGB BLD-MCNC: 11 G/DL (ref 12–16)
HGB BLD-MCNC: 11.2 G/DL (ref 12–16)
IMMATURE GRANULOCYTE COUNT: 0.13 X10(3) UL (ref 0–1)
IMMATURE GRANULOCYTE COUNT: 0.23 X10(3) UL (ref 0–1)
IMMATURE GRANULOCYTE RATIO %: 1.2 %
IMMATURE GRANULOCYTE RATIO %: 1.9 %
LYMPHOCYTES # BLD AUTO: 0.31 X10(3) UL (ref 0.9–4)
LYMPHOCYTES # BLD AUTO: 0.44 X10(3) UL (ref 0.9–4)
LYMPHOCYTES NFR BLD AUTO: 2.9 %
LYMPHOCYTES NFR BLD AUTO: 3.6 %
M PROTEIN MFR SERPL ELPH: 5.4 G/DL (ref 6.1–8.3)
MCH RBC QN AUTO: 30.6 PG (ref 27–33.2)
MCH RBC QN AUTO: 31.5 PG (ref 27–33.2)
MCHC RBC AUTO-ENTMCNC: 32.3 G/DL (ref 31–37)
MCHC RBC AUTO-ENTMCNC: 32.8 G/DL (ref 31–37)
MCV RBC AUTO: 94.7 FL (ref 81–100)
MCV RBC AUTO: 95.8 FL (ref 81–100)
METHEMOGLOBIN: 0.7 % SAT (ref 0.4–1.5)
MONOCYTES # BLD AUTO: 0.79 X10(3) UL (ref 0.1–0.6)
MONOCYTES # BLD AUTO: 1.08 X10(3) UL (ref 0.1–0.6)
MONOCYTES NFR BLD AUTO: 7.3 %
MONOCYTES NFR BLD AUTO: 8.8 %
NEUTROPHIL ABS PRELIM: 10.54 X10 (3) UL (ref 1.3–6.7)
NEUTROPHIL ABS PRELIM: 9.56 X10 (3) UL (ref 1.3–6.7)
NEUTROPHILS # BLD AUTO: 10.54 X10(3) UL (ref 1.3–6.7)
NEUTROPHILS # BLD AUTO: 9.56 X10(3) UL (ref 1.3–6.7)
NEUTROPHILS NFR BLD AUTO: 85.5 %
NEUTROPHILS NFR BLD AUTO: 88.4 %
PATIENT TEMPERATURE: 98 F
PEEP: 8 CM H2O
PLATELET # BLD AUTO: 229 10(3)UL (ref 150–450)
PLATELET # BLD AUTO: 232 10(3)UL (ref 150–450)
POTASSIUM SERPL-SCNC: 4 MMOL/L (ref 3.6–5.1)
POTASSIUM SERPL-SCNC: 4 MMOL/L (ref 3.6–5.1)
RBC # BLD AUTO: 3.56 X10(6)UL (ref 3.8–5.1)
RBC # BLD AUTO: 3.6 X10(6)UL (ref 3.8–5.1)
RED CELL DISTRIBUTION WIDTH-SD: 50 FL (ref 35.1–46.3)
RED CELL DISTRIBUTION WIDTH-SD: 52 FL (ref 35.1–46.3)
SODIUM SERPL-SCNC: 146 MMOL/L (ref 136–144)
TIDAL VOLUME: 500 ML
TOTAL HEMOGLOBIN: 11.8 G/DL (ref 11.7–16)
VENT RATE: 14 /MIN
WBC # BLD AUTO: 10.8 X10(3) UL (ref 4–13)
WBC # BLD AUTO: 12.3 X10(3) UL (ref 4–13)

## 2018-01-26 PROCEDURE — 99232 SBSQ HOSP IP/OBS MODERATE 35: CPT | Performed by: HOSPITALIST

## 2018-01-26 PROCEDURE — 71045 X-RAY EXAM CHEST 1 VIEW: CPT | Performed by: NURSE PRACTITIONER

## 2018-01-26 NOTE — PLAN OF CARE
Received pt this am on ventilator; propofol for sedation. Family at bedside overnight; daughter at bedside this morning; all questions answered.  Pt's   last year d/t unknown illness, was on ventilator; this is causing distress to pt's daught

## 2018-01-26 NOTE — PROGRESS NOTES
[facility]                INFECTIOUS DISEASE PROGRESS NOTE    Tess Frye Patient Status:  Inpatient    1955 MRN [id number]   [facility] 4SW-A Attending Jose Patel MD   Hosp Day # 2 PCP Unknown Pcp     Antibiotics:o result Updated: 01/26/18 0700   Specimen: Blood from Blood,peripheral     Blood Culture Result No Growth 2 Days   Blood Culture FREQ X 2 [[id number]] Collected: 01/24/18 4281   Order Status: Completed Lab Status: Preliminary result Updated: 01/26/18 0700 from [address]     Blood Culture Result No Growth 1 Day         Radiology     cxr 1/26 stable no consolidation    Problem list reviewed:  Patient Active Problem List:     Hypokalemia     Hyperglycemia     Azotemia     Metabolic alkalosis     COPD exace

## 2018-01-26 NOTE — PROGRESS NOTES
[facility]  Progress Note     tte  Patient Status:  Inpatient    1955 MRN [id number]   [facility] 4SW-A Attending Etelvina Ruff MD   Hosp Day # 2 PCP Unknown Pcp     Chief Complaint: Dyspnea    S: Patient remains mg Per NG Tube Q12H   • docusate sodium  100 mg Oral BID   • AmLODIPine Besylate  10 mg Oral Daily   • aspirin  325 mg Oral Daily   • gabapentin  100 mg Oral TID   • lisinopril  40 mg Oral Daily   • Montelukast Sodium  10 mg Oral Nightly   • enoxaparin  40

## 2018-01-26 NOTE — PLAN OF CARE
Assumed care at [address] [place] [address]. Sedated. Titrating sedation (Propofol and fentanyl drips) as tolerated. Vented. Has episodes of coughing and fighting the ventilator. SR/ST.  VSS.  IVF. Vigileo -- goal SVV <13.   Became tachycardic in the 140-150s while turni

## 2018-01-26 NOTE — PROGRESS NOTES
[address] Patient Status:  Inpatient    1955 MRN [id number]   [facility] 4SW-A Attending Vinicio Abdi MD   Hosp Day # 2 PCP Unknown Pcp     Pulm / Critical Care Progress Note     S: remains intubated, sedat 3 completed shifts: In: 1073 [I.V.:3408; NG/GT:120; IV PIGGYBACK:200]  Out: 0523 [Urine:1225; Emesis/NG output:400]  I/O this shift: In: 4242.8 [I.V.:4142.8;  IV PIGGYBACK:100]  Out: 1400 [Urine:700; Emesis/NG output:700]     Physical Exam:   General: int with hypodensitities in bilateral kidneys, UA without evidence of infection to suggest that this represents pyelonephritis  5. ?lesion in right kidney superior pole  -will need MRI renal mass protocol at some point when acute issues resolve   6.  FEN:   -TF

## 2018-01-26 NOTE — DIETARY NOTE
[facility]    NUTRITION INITIAL ASSESSMENT    Pt does not meet malnutrition criteria.       NUTRITION DIAGNOSIS/PROBLEM:    Inadequate oral intake related inability to meet nutrition via oral route as evidenced by ongoing intubation and sedation    NUT edema notes per visual exam.    NUTRITION PRESCRIPTION:  Calories: 2601-6151 calories/day (28-33 calories per kg)  Protein: 72-99 grams protein/day (1.3-1.8 grams protein per kg)  Fluid: ~1 ml/kcal or per MD discretion    MONITOR AND EVALUATE/NUTRITION GOA

## 2018-01-27 ENCOUNTER — APPOINTMENT (OUTPATIENT)
Dept: GENERAL RADIOLOGY | Facility: HOSPITAL | Age: 63
DRG: 207 | End: 2018-01-27
Attending: INTERNAL MEDICINE
Payer: MEDICARE

## 2018-01-27 ENCOUNTER — APPOINTMENT (OUTPATIENT)
Dept: GENERAL RADIOLOGY | Facility: HOSPITAL | Age: 63
DRG: 207 | End: 2018-01-27
Attending: NURSE PRACTITIONER
Payer: MEDICARE

## 2018-01-27 LAB
ARTERIAL BLD GAS O2 SATURATION: 98 % (ref 92–100)
ARTERIAL BLOOD GAS BASE EXCESS: 1.9
ARTERIAL BLOOD GAS HCO3: 28.1 MEQ/L (ref 22–26)
ARTERIAL BLOOD GAS PCO2: 50 MM HG (ref 35–45)
ARTERIAL BLOOD GAS PH: 7.36 (ref 7.35–7.45)
ARTERIAL BLOOD GAS PO2: 168 MM HG (ref 80–105)
BASOPHILS # BLD AUTO: 0.01 X10(3) UL (ref 0–0.1)
BASOPHILS NFR BLD AUTO: 0.1 %
BUN BLD-MCNC: 21 MG/DL (ref 8–20)
CALCIUM BLD-MCNC: 7.5 MG/DL (ref 8.3–10.3)
CALCULATED O2 SATURATION: 99 % (ref 92–100)
CARBOXYHEMOGLOBIN: 0.7 % SAT (ref 0–3)
CHLORIDE: 114 MMOL/L (ref 101–111)
CO2: 28 MMOL/L (ref 22–32)
CREAT BLD-MCNC: 0.51 MG/DL (ref 0.55–1.02)
EOSINOPHIL # BLD AUTO: 0 X10(3) UL (ref 0–0.3)
EOSINOPHIL NFR BLD AUTO: 0 %
ERYTHROCYTE [DISTWIDTH] IN BLOOD BY AUTOMATED COUNT: 14.8 % (ref 11.5–16)
FIO2: 40 %
GLUCOSE BLD-MCNC: 107 MG/DL (ref 65–99)
GLUCOSE BLD-MCNC: 116 MG/DL (ref 65–99)
GLUCOSE BLD-MCNC: 117 MG/DL (ref 65–99)
GLUCOSE BLD-MCNC: 122 MG/DL (ref 65–99)
GLUCOSE BLD-MCNC: 129 MG/DL (ref 65–99)
GLUCOSE BLD-MCNC: 142 MG/DL (ref 70–99)
HAV IGM SER QL: 2.8 MG/DL (ref 1.7–3)
HCT VFR BLD AUTO: 32.6 % (ref 34–50)
HGB BLD-MCNC: 10.8 G/DL (ref 12–16)
IMMATURE GRANULOCYTE COUNT: 0.26 X10(3) UL (ref 0–1)
IMMATURE GRANULOCYTE RATIO %: 2.6 %
LYMPHOCYTES # BLD AUTO: 0.37 X10(3) UL (ref 0.9–4)
LYMPHOCYTES NFR BLD AUTO: 3.7 %
MCH RBC QN AUTO: 30.9 PG (ref 27–33.2)
MCHC RBC AUTO-ENTMCNC: 33.1 G/DL (ref 31–37)
MCV RBC AUTO: 93.4 FL (ref 81–100)
METHEMOGLOBIN: 0.8 % SAT (ref 0.4–1.5)
MONOCYTES # BLD AUTO: 0.54 X10(3) UL (ref 0.1–0.6)
MONOCYTES NFR BLD AUTO: 5.4 %
NEUTROPHIL ABS PRELIM: 8.73 X10 (3) UL (ref 1.3–6.7)
NEUTROPHILS # BLD AUTO: 8.73 X10(3) UL (ref 1.3–6.7)
NEUTROPHILS NFR BLD AUTO: 88.2 %
PATIENT TEMPERATURE: 97.9 F
PEEP: 8 CM H2O
PLATELET # BLD AUTO: 233 10(3)UL (ref 150–450)
POTASSIUM SERPL-SCNC: 4 MMOL/L (ref 3.6–5.1)
RBC # BLD AUTO: 3.49 X10(6)UL (ref 3.8–5.1)
RED CELL DISTRIBUTION WIDTH-SD: 51.3 FL (ref 35.1–46.3)
SODIUM SERPL-SCNC: 148 MMOL/L (ref 136–144)
TIDAL VOLUME: 500 ML
TOTAL HEMOGLOBIN: 11.1 G/DL (ref 11.7–16)
VENT RATE: 14 /MIN
WBC # BLD AUTO: 9.9 X10(3) UL (ref 4–13)

## 2018-01-27 PROCEDURE — 99232 SBSQ HOSP IP/OBS MODERATE 35: CPT | Performed by: HOSPITALIST

## 2018-01-27 PROCEDURE — 71045 X-RAY EXAM CHEST 1 VIEW: CPT | Performed by: NURSE PRACTITIONER

## 2018-01-27 PROCEDURE — 71045 X-RAY EXAM CHEST 1 VIEW: CPT | Performed by: INTERNAL MEDICINE

## 2018-01-27 RX ORDER — IPRATROPIUM BROMIDE AND ALBUTEROL SULFATE 2.5; .5 MG/3ML; MG/3ML
3 SOLUTION RESPIRATORY (INHALATION)
Status: DISCONTINUED | OUTPATIENT
Start: 2018-01-27 | End: 2018-01-28

## 2018-01-27 RX ORDER — DEXTROSE AND SODIUM CHLORIDE 5; .45 G/100ML; G/100ML
INJECTION, SOLUTION INTRAVENOUS CONTINUOUS
Status: DISCONTINUED | OUTPATIENT
Start: 2018-01-27 | End: 2018-01-27

## 2018-01-27 NOTE — PROGRESS NOTES
[facility]  Progress Note     Meliza Gonzalez Patient Status:  Inpatient    1955 MRN [id number]   [facility] 4SW-A Attending Dillon Kirby MD   Hosp Day # 3 PCP Unknown Pcp     Chief Complaint: Dyspnea    S: Patient intubate 6 times per day   • pantoprazole (PROTONIX) IV push  40 mg Intravenous Q24H   • Oseltamivir Phosphate  75 mg Per NG Tube Q12H   • docusate sodium  100 mg Oral BID   • AmLODIPine Besylate  10 mg Oral Daily   • aspirin  325 mg Oral Daily   • gabapentin  100

## 2018-01-27 NOTE — PLAN OF CARE
GASTROINTESTINAL - ADULT    • Maintains or returns to baseline bowel function Not Progressing          Diabetes/Glucose Control    • Glucose maintained within prescribed range Progressing        RESPIRATORY - ADULT    • Achieves optimal ventilation and oxy

## 2018-01-27 NOTE — PROGRESS NOTES
Earlier in shift TF initiated per dietary recommendations and per [facility] plan of TF for nutrition. Bowel sounds present x4, and abdomen appears non-tender to palpation. Most recent follow-up imaging )KUB and CT abd/pelvis) without obstruction.   Patient cont

## 2018-01-27 NOTE — PROGRESS NOTES
Critical Care Progress Note     Assessment / Plan:  1. Acute resp failure: secondary to asthma/copd exac in the setting of influenza. CTA negative for PE and without infiltrates.  S/p intubation 1/24  - continue full mechanical ventilation  - decrease PEEP

## 2018-01-27 NOTE — PLAN OF CARE
Received pt this am on ventilator. Propofol and fentanyl gtts. Attempting to titrate down on propofol as pt will tolerate. ETT advanced today per order by RT, CXR's to confirm, pt tolerating well. Daughter at bedside this morning.      MUSCULOSKELETAL -

## 2018-01-27 NOTE — PROGRESS NOTES
[facility]                INFECTIOUS DISEASE PROGRESS NOTE    Celina Carranza Patient Status:  Inpatient    1955 MRN [id number]   [facility] 4SW-A Attending Brea Clifford MD   Hosp Day # 3 PCP Unknown Pcp     Antibiotics: damir pneumonia on cxr  Complete ceftriaxone with pm dose today  Complete tamiflu x 5d  Weaning per pulmonary  Monitor resp secretions and cxr  Follow temps and wbc    Case and plan d/w staff.  Will follow    Grace Menchaca MD

## 2018-01-28 ENCOUNTER — APPOINTMENT (OUTPATIENT)
Dept: GENERAL RADIOLOGY | Facility: HOSPITAL | Age: 63
DRG: 207 | End: 2018-01-28
Attending: NURSE PRACTITIONER
Payer: MEDICARE

## 2018-01-28 LAB
ARTERIAL BLD GAS O2 SATURATION: 97 % (ref 92–100)
ARTERIAL BLOOD GAS BASE EXCESS: 5.6
ARTERIAL BLOOD GAS HCO3: 31.9 MEQ/L (ref 22–26)
ARTERIAL BLOOD GAS PCO2: 54 MM HG (ref 35–45)
ARTERIAL BLOOD GAS PH: 7.39 (ref 7.35–7.45)
ARTERIAL BLOOD GAS PO2: 141 MM HG (ref 80–105)
BUN BLD-MCNC: 27 MG/DL (ref 8–20)
CALCIUM BLD-MCNC: 8 MG/DL (ref 8.3–10.3)
CALCULATED O2 SATURATION: 99 % (ref 92–100)
CARBOXYHEMOGLOBIN: 0.8 % SAT (ref 0–3)
CHLORIDE: 108 MMOL/L (ref 101–111)
CO2: 33 MMOL/L (ref 22–32)
CREAT BLD-MCNC: 0.61 MG/DL (ref 0.55–1.02)
ERYTHROCYTE [DISTWIDTH] IN BLOOD BY AUTOMATED COUNT: 14.6 % (ref 11.5–16)
FIO2: 40 %
GLUCOSE BLD-MCNC: 104 MG/DL (ref 65–99)
GLUCOSE BLD-MCNC: 122 MG/DL (ref 65–99)
GLUCOSE BLD-MCNC: 132 MG/DL (ref 70–99)
GLUCOSE BLD-MCNC: 149 MG/DL (ref 65–99)
GLUCOSE BLD-MCNC: 162 MG/DL (ref 65–99)
HCT VFR BLD AUTO: 35.5 % (ref 34–50)
HGB BLD-MCNC: 11.3 G/DL (ref 12–16)
MCH RBC QN AUTO: 30.1 PG (ref 27–33.2)
MCHC RBC AUTO-ENTMCNC: 31.8 G/DL (ref 31–37)
MCV RBC AUTO: 94.4 FL (ref 81–100)
METHEMOGLOBIN: 0.7 % SAT (ref 0.4–1.5)
P/F RATIO: 681.4 MMHG
PATIENT TEMPERATURE: 98.1 F
PEEP: 5 CM H2O
PLATELET # BLD AUTO: 207 10(3)UL (ref 150–450)
POTASSIUM SERPL-SCNC: 4.3 MMOL/L (ref 3.6–5.1)
RBC # BLD AUTO: 3.76 X10(6)UL (ref 3.8–5.1)
RED CELL DISTRIBUTION WIDTH-SD: 51.1 FL (ref 35.1–46.3)
SODIUM SERPL-SCNC: 145 MMOL/L (ref 136–144)
TIDAL VOLUME: 500 ML
TOTAL HEMOGLOBIN: 11.8 G/DL (ref 11.7–16)
VENT RATE: 12 /MIN
WBC # BLD AUTO: 10.4 X10(3) UL (ref 4–13)

## 2018-01-28 PROCEDURE — 71045 X-RAY EXAM CHEST 1 VIEW: CPT | Performed by: NURSE PRACTITIONER

## 2018-01-28 PROCEDURE — 99232 SBSQ HOSP IP/OBS MODERATE 35: CPT | Performed by: HOSPITALIST

## 2018-01-28 RX ORDER — DEXMEDETOMIDINE HYDROCHLORIDE 4 UG/ML
INJECTION, SOLUTION INTRAVENOUS CONTINUOUS PRN
Status: DISCONTINUED | OUTPATIENT
Start: 2018-01-28 | End: 2018-02-01

## 2018-01-28 RX ORDER — IPRATROPIUM BROMIDE AND ALBUTEROL SULFATE 2.5; .5 MG/3ML; MG/3ML
3 SOLUTION RESPIRATORY (INHALATION)
Status: DISCONTINUED | OUTPATIENT
Start: 2018-01-28 | End: 2018-01-30

## 2018-01-28 RX ORDER — ASPIRIN 325 MG
325 TABLET ORAL DAILY
Status: DISCONTINUED | OUTPATIENT
Start: 2018-01-29 | End: 2018-02-01

## 2018-01-28 RX ORDER — METOPROLOL TARTRATE 5 MG/5ML
5 INJECTION INTRAVENOUS EVERY 6 HOURS PRN
Status: DISCONTINUED | OUTPATIENT
Start: 2018-01-28 | End: 2018-02-01

## 2018-01-28 RX ORDER — BISACODYL 10 MG
10 SUPPOSITORY, RECTAL RECTAL
Status: DISCONTINUED | OUTPATIENT
Start: 2018-01-28 | End: 2018-01-28

## 2018-01-28 RX ORDER — HYDRALAZINE HYDROCHLORIDE 20 MG/ML
INJECTION INTRAMUSCULAR; INTRAVENOUS
Status: COMPLETED
Start: 2018-01-28 | End: 2018-01-28

## 2018-01-28 RX ORDER — METOPROLOL TARTRATE 5 MG/5ML
INJECTION INTRAVENOUS
Status: COMPLETED
Start: 2018-01-28 | End: 2018-01-28

## 2018-01-28 RX ORDER — POLYETHYLENE GLYCOL 3350 17 G/17G
17 POWDER, FOR SOLUTION ORAL DAILY
Status: DISCONTINUED | OUTPATIENT
Start: 2018-01-28 | End: 2018-02-01

## 2018-01-28 RX ORDER — HYDRALAZINE HYDROCHLORIDE 20 MG/ML
10 INJECTION INTRAMUSCULAR; INTRAVENOUS ONCE
Status: COMPLETED | OUTPATIENT
Start: 2018-01-29 | End: 2018-01-28

## 2018-01-28 RX ORDER — IPRATROPIUM BROMIDE AND ALBUTEROL SULFATE 2.5; .5 MG/3ML; MG/3ML
3 SOLUTION RESPIRATORY (INHALATION)
Status: DISCONTINUED | OUTPATIENT
Start: 2018-01-28 | End: 2018-01-28

## 2018-01-28 NOTE — PROGRESS NOTES
[facility]  Progress Note     Kristyn Morales Patient Status:  Inpatient    1955 MRN [id number]   [facility] 4SW-A Attending Corina Cintron MD   Hosp Day # 4 PCP Unknown Pcp     Chief Complaint: Dyspnea    S: Patient intubate 3 mL Nebulization Q2H   • pantoprazole (PROTONIX) IV push  40 mg Intravenous Q24H   • Oseltamivir Phosphate  75 mg Per NG Tube Q12H   • docusate sodium  100 mg Oral BID   • AmLODIPine Besylate  10 mg Oral Daily   • aspirin  325 mg Oral Daily   • gabapentin

## 2018-01-28 NOTE — PROGRESS NOTES
Critical Care Progress Note     Assessment / Plan:  1. Acute resp failure: secondary to asthma/copd exac in the setting of influenza. CTA negative for PE and without infiltrates.  S/p intubation 1/24  - full vent support  - not ready for SBT  - minimize sed

## 2018-01-29 LAB
ALBUMIN SERPL-MCNC: 2.9 G/DL (ref 3.5–4.8)
ALP LIVER SERPL-CCNC: 41 U/L (ref 46–118)
ALT SERPL-CCNC: 31 U/L (ref 14–54)
ARTERIAL BLD GAS O2 SATURATION: 96 % (ref 92–100)
ARTERIAL BLD GAS O2 SATURATION: 97 % (ref 92–100)
ARTERIAL BLD GAS O2 SATURATION: 97 % (ref 92–100)
ARTERIAL BLOOD GAS BASE EXCESS: 7.9
ARTERIAL BLOOD GAS BASE EXCESS: 8.3
ARTERIAL BLOOD GAS BASE EXCESS: 8.9
ARTERIAL BLOOD GAS HCO3: 34 MEQ/L (ref 22–26)
ARTERIAL BLOOD GAS HCO3: 34.3 MEQ/L (ref 22–26)
ARTERIAL BLOOD GAS HCO3: 34.8 MEQ/L (ref 22–26)
ARTERIAL BLOOD GAS PCO2: 53 MM HG (ref 35–45)
ARTERIAL BLOOD GAS PCO2: 54 MM HG (ref 35–45)
ARTERIAL BLOOD GAS PCO2: 54 MM HG (ref 35–45)
ARTERIAL BLOOD GAS PH: 7.42 (ref 7.35–7.45)
ARTERIAL BLOOD GAS PH: 7.43 (ref 7.35–7.45)
ARTERIAL BLOOD GAS PH: 7.43 (ref 7.35–7.45)
ARTERIAL BLOOD GAS PO2: 121 MM HG (ref 80–105)
ARTERIAL BLOOD GAS PO2: 132 MM HG (ref 80–105)
ARTERIAL BLOOD GAS PO2: 95 MM HG (ref 80–105)
AST SERPL-CCNC: 21 U/L (ref 15–41)
BAND %: 3 %
BASOPHIL % MANUAL: 0 %
BASOPHIL ABSOLUTE MANUAL: 0 X10(3) UL (ref 0–0.1)
BILIRUB SERPL-MCNC: 0.3 MG/DL (ref 0.1–2)
BUN BLD-MCNC: 25 MG/DL (ref 8–20)
CALCIUM BLD-MCNC: 8.2 MG/DL (ref 8.3–10.3)
CALCULATED O2 SATURATION: 98 % (ref 92–100)
CALCULATED O2 SATURATION: 99 % (ref 92–100)
CALCULATED O2 SATURATION: 99 % (ref 92–100)
CARBOXYHEMOGLOBIN: 0.9 % SAT (ref 0–3)
CHLORIDE: 107 MMOL/L (ref 101–111)
CO2: 34 MMOL/L (ref 22–32)
CREAT BLD-MCNC: 0.59 MG/DL (ref 0.55–1.02)
EOSINOPHIL % MANUAL: 0 %
EOSINOPHIL ABSOLUTE MANUAL: 0 X10(3) UL (ref 0–0.3)
ERYTHROCYTE [DISTWIDTH] IN BLOOD BY AUTOMATED COUNT: 14 % (ref 11.5–16)
FIO2: 40 %
GLUCOSE BLD-MCNC: 125 MG/DL (ref 65–99)
GLUCOSE BLD-MCNC: 126 MG/DL (ref 65–99)
GLUCOSE BLD-MCNC: 160 MG/DL (ref 70–99)
GLUCOSE BLD-MCNC: 167 MG/DL (ref 65–99)
HAV IGM SER QL: 2.6 MG/DL (ref 1.7–3)
HCT VFR BLD AUTO: 37.5 % (ref 34–50)
HGB BLD-MCNC: 12.2 G/DL (ref 12–16)
INSPIRATORY TIME: 0.6 %
IONIZED CALCIUM: 1.18 MMOL/L (ref 1.12–1.32)
IONIZED CALCIUM: 1.2 MMOL/L (ref 1.12–1.32)
L/M: 5 L/MIN
L/M: 8 L/MIN
LACTIC ACID ARTERIAL: 1.3 MMOL/L (ref 0.5–2)
LACTIC ACID ARTERIAL: 1.5 MMOL/L (ref 0.5–2)
LYMPHOCYTE % MANUAL: 13 %
LYMPHOCYTE ABSOLUTE MANUAL: 1.53 X10(3) UL (ref 0.9–4)
M PROTEIN MFR SERPL ELPH: 6.3 G/DL (ref 6.1–8.3)
MCH RBC QN AUTO: 30.3 PG (ref 27–33.2)
MCHC RBC AUTO-ENTMCNC: 32.5 G/DL (ref 31–37)
MCV RBC AUTO: 93.1 FL (ref 81–100)
METHEMOGLOBIN: 0.7 % SAT (ref 0.4–1.5)
MONOCYTE % MANUAL: 8 %
MONOCYTE ABSOLUTE MANUAL: 0.94 X10(3) UL (ref 0.1–0.6)
MORPHOLOGY: NORMAL
NEUTROPHIL ABS PRELIM: 9.22 X10 (3) UL (ref 1.3–6.7)
NEUTROPHIL ABSOLUTE MANUAL: 9.32 X10(3) UL (ref 1.3–6.7)
NEUTROPHILS % MANUAL: 76 %
PATIENT TEMPERATURE: 98.1 F
PATIENT TEMPERATURE: 98.8 F
PATIENT TEMPERATURE: 99.1 F
PEEP: 5 CM H2O
PHOSPHATE SERPL-MCNC: 2.8 MG/DL (ref 2.5–4.9)
PLATELET # BLD AUTO: 235 10(3)UL (ref 150–450)
PLATELET MORPHOLOGY: NORMAL
POTASSIUM BLOOD GAS: 4.2 MMOL/L (ref 3.6–5.1)
POTASSIUM BLOOD GAS: 4.4 MMOL/L (ref 3.6–5.1)
POTASSIUM SERPL-SCNC: 4.5 MMOL/L (ref 3.6–5.1)
RBC # BLD AUTO: 4.03 X10(6)UL (ref 3.8–5.1)
RED CELL DISTRIBUTION WIDTH-SD: 48 FL (ref 35.1–46.3)
SODIUM BLOOD GAS: 145 MMOL/L (ref 136–144)
SODIUM BLOOD GAS: 145 MMOL/L (ref 136–144)
SODIUM SERPL-SCNC: 143 MMOL/L (ref 136–144)
TIDAL VOLUME: 500 ML
TOTAL CELLS COUNTED: 100
TOTAL HEMOGLOBIN: 12.5 G/DL (ref 11.7–16)
TOTAL HEMOGLOBIN: 12.6 G/DL (ref 11.7–16)
TOTAL HEMOGLOBIN: 13.4 G/DL (ref 11.7–16)
TRIGL SERPL-MCNC: 61 MG/DL (ref ?–150)
VENT RATE: 12 /MIN
WBC # BLD AUTO: 11.8 X10(3) UL (ref 4–13)

## 2018-01-29 PROCEDURE — 90792 PSYCH DIAG EVAL W/MED SRVCS: CPT | Performed by: OTHER

## 2018-01-29 PROCEDURE — 99232 SBSQ HOSP IP/OBS MODERATE 35: CPT | Performed by: HOSPITALIST

## 2018-01-29 RX ORDER — HYDRALAZINE HYDROCHLORIDE 20 MG/ML
10 INJECTION INTRAMUSCULAR; INTRAVENOUS EVERY 4 HOURS PRN
Status: DISCONTINUED | OUTPATIENT
Start: 2018-01-29 | End: 2018-02-01

## 2018-01-29 RX ORDER — LORAZEPAM 2 MG/ML
0.5 INJECTION INTRAMUSCULAR EVERY 4 HOURS PRN
Status: DISCONTINUED | OUTPATIENT
Start: 2018-01-29 | End: 2018-01-30

## 2018-01-29 RX ORDER — HYDRALAZINE HYDROCHLORIDE 20 MG/ML
INJECTION INTRAMUSCULAR; INTRAVENOUS
Status: DISPENSED
Start: 2018-01-29 | End: 2018-01-29

## 2018-01-29 RX ORDER — HALOPERIDOL 5 MG/ML
0.5 INJECTION INTRAMUSCULAR EVERY 4 HOURS PRN
Status: DISCONTINUED | OUTPATIENT
Start: 2018-01-29 | End: 2018-01-30

## 2018-01-29 RX ORDER — ACETAMINOPHEN 10 MG/ML
1000 INJECTION, SOLUTION INTRAVENOUS ONCE
Status: DISCONTINUED | OUTPATIENT
Start: 2018-01-29 | End: 2018-02-01

## 2018-01-29 RX ORDER — METOPROLOL TARTRATE 5 MG/5ML
5 INJECTION INTRAVENOUS ONCE
Status: COMPLETED | OUTPATIENT
Start: 2018-01-29 | End: 2018-01-29

## 2018-01-29 RX ORDER — LORAZEPAM 2 MG/ML
INJECTION INTRAMUSCULAR
Status: COMPLETED
Start: 2018-01-29 | End: 2018-01-29

## 2018-01-29 NOTE — PROGRESS NOTES
[facility]  Progress Note     Willie Goode Patient Status:  Inpatient    1955 MRN [id number]   [facility] 4SW-A Attending Rehan Artis MD   Hosp Day # 5 PCP Unknown Pcp     Chief Complaint: Dyspnea    S: Patient   [facility] reviewed in Epic.     Medications:   • LORazepam       • hydrALAzine HCl       • PEG 3350  17 g Oral Daily   • ipratropium-albuterol  3 mL Nebulization Q2H   • aspirin  325 mg Oral Daily   • pantoprazole (PROTONIX) IV push  40 mg Intravenous Q24H   • docusa

## 2018-01-29 NOTE — PLAN OF CARE
Diabetes/Glucose Control    • Glucose maintained within prescribed range Progressing        GENITOURINARY - ADULT    • Absence of urinary retention Progressing        PAIN - ADULT    • Verbalizes/displays adequate comfort level or patient's stated pain goa

## 2018-01-29 NOTE — CONSULTS
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 She lives alone in [place]. Past Medical History:  No date: Alcohol abuse  No date:  Anxiety  No date: Bipolar affective ([address]  No date: Depression  No date: Extrinsic asthma, unspecified  No date: Fibroids  No da Enoxaparin Sodium (LOVENOX) 40 MG/0.4ML injection 40 mg, 40 mg, Subcutaneous, Daily  •  MethylPREDNISolone Sodium Succ (Solu-MEDROL) injection 80 mg, 80 mg, Intravenous, Q8H  •  ondansetron HCl (ZOFRAN) injection 4 mg, 4 mg, Intravenous, Q6H PRN  •  bisaco already0  Behavior: crying out at times after being on facetime with grandchild    Speech: terse, incoherent at times    Mood:  Unable to tell me  Affect:  Anxious and tearful    Thought process: disorganized  Thought content: unable to obtain    Laurent Call

## 2018-01-29 NOTE — PROGRESS NOTES
PSYCH CONSULT    Date of Admission: 1/24/18  Date of Consult: 1/29/18  Reason for Consultation: Agitation, anxiety    Impression:  AXIS 1: Acute delirium from influenza and COPD/asthma exacerbation.  Bipolar disorder unspecified per daughter's history and c

## 2018-01-29 NOTE — PROGRESS NOTES
Called by RN with patient having frequent breath stacking--every 1-2 breaths--and constant high pressure alarms. Patient was on VC 12/500/.40/+5, attempting to pull higher volumes with most breaths. Sedation had been adjusted with no improvement.   Pa with fentanyl at 100mcg/h at time of her waking up. Propofol gtt was stopped approximately 30 minutes prior to above, and was at 10 mcg/kg/min when stopped. Propofol restarted with this activity and precedex gtt increase to max dose.   She became more hyp

## 2018-01-29 NOTE — PLAN OF CARE
RESPIRATORY - ADULT    • Achieves optimal ventilation and oxygenation Not Progressing          NEUROLOGICAL - ADULT    • Achieves stable or improved neurological status Not Progressing

## 2018-01-29 NOTE — PROGRESS NOTES
Critical Care Progress Note        NAME: Vilma Reyez - ROOM: 58/989-Z - MRN: [id number] - Age: 63year old - : 1955  Date of Admission: 2018  6:20 AM  Admission Diagnosis: Sinus tachycardia [R00.0]  Influenza [J11.1]  COPD exacerbation ( mcg/kg/min (01/29/18 6126)   • fentanyl (SUBLIMAZE) infusion 75 mcg/hr (01/29/18 2597)   • norepinephrine     • vasopressin (PITRESSIN) infusion for shock       PRN Medication:hydrALAzine HCl, Dexmedetomidine HCl in NaCl, metoprolol Tartrate, ondansetron H Date/Time Value Ref Range Status   01/29/2018 04:14 AM 2.9 (L) 3.5 - 4.8 g/dL Final   ----------  ALBUMIN   Date/Time Value Ref Range Status   01/13/2014 11:45 PM 3.4 (L) 3.5 - 4.8 g/dL Final   ----------      Imaging: chest x-ray from 1/28 reviewed- ET

## 2018-01-29 NOTE — PROGRESS NOTES
[facility]                INFECTIOUS DISEASE PROGRESS NOTE    Kristyn Morales Patient Status:  Inpatient    1955 MRN [id number]   [facility] 4SW-A Attending Corina Cintron MD   Hosp Day # 5 PCP Unknown Pcp     Antibiotics:c Constipation     Abdominal pain, generalized     Constipation, unspecified constipation type     Sinus tachycardia     At risk for falling     Hyponatremia     Respiratory acidosis     Influenza      ASSESSMENT/PLAN:  1.  Respiratory failuire/COPD/influen

## 2018-01-29 NOTE — PLAN OF CARE
Received pt this am on ventilator. On precedex and fentanyl gtts, weaning propofol successfully. Tolerating vent this afternoon w/o issue. Daughter at bedside for most of the afternoon.      MUSCULOSKELETAL - ADULT    • Return mobility to safest level of fu

## 2018-01-29 NOTE — PROGRESS NOTES
Patient was evaluated for extubation. Given her known anxiety prior to intubation and current anxiety, patient was unable to tolerate T piece trial, sedation was discontinued. Patient was following commands and she was extubated per RT.  Post extubation, pa

## 2018-01-29 NOTE — RESPIRATORY THERAPY NOTE
Per dr Dex aGnn, sedation turned off and when pt is awake and appropriately following commands okay to extubate pt. Pt extubated at 1045 and placed on 5lpm nasal cannula. Pt then became very agitated, RR high 30s, HR 170s, /100.  Nebulizer starte

## 2018-01-29 NOTE — PLAN OF CARE
Odette GARCIA notified that arterial line reading -200s, while SBP on cuff is 120-140s; however, overnight and this AM cuff and arterial line correlated. Order received to go by cuff and DC arterial line.     1610: pt lethargic, only grimacing to s

## 2018-01-29 NOTE — CM/SW NOTE
01/29/18 1500   CM/SW Referral Data   Referral Source Physician   Reason for Referral Discharge planning   Informant Other   Social History   Recreational Drug/Alcohol Use no   Major Changes Last 6 Months no   Domestic/Partner Violence no   Suicidal Aida Bender

## 2018-01-29 NOTE — PROGRESS NOTES
[facility]  Progress Note     Rebekah Cao Patient Status:  Inpatient    1955 MRN [id number]   [facility] 4SW-A Attending Randall Aschoff, MD   Hosp Day # 5 PCP Unknown Pcp     Chief Complaint: Dyspnea    S: Patient without hydrALAzine HCl       • PEG 3350  17 g Oral Daily   • ipratropium-albuterol  3 mL Nebulization Q2H   • aspirin  325 mg Oral Daily   • pantoprazole (PROTONIX) IV push  40 mg Intravenous Q24H   • docusate sodium  100 mg Oral BID   • AmLODIPine Besylate  10 m

## 2018-01-30 ENCOUNTER — APPOINTMENT (OUTPATIENT)
Dept: GENERAL RADIOLOGY | Facility: HOSPITAL | Age: 63
DRG: 207 | End: 2018-01-30
Attending: NURSE PRACTITIONER
Payer: MEDICARE

## 2018-01-30 LAB
ALBUMIN SERPL-MCNC: 3 G/DL (ref 3.5–4.8)
ALLENS TEST: POSITIVE
ALP LIVER SERPL-CCNC: 37 U/L (ref 46–118)
ALT SERPL-CCNC: 34 U/L (ref 14–54)
ARTERIAL BLD GAS O2 SATURATION: 95 % (ref 92–100)
ARTERIAL BLOOD GAS BASE EXCESS: 6.6
ARTERIAL BLOOD GAS HCO3: 30.7 MEQ/L (ref 22–26)
ARTERIAL BLOOD GAS PCO2: 42 MM HG (ref 35–45)
ARTERIAL BLOOD GAS PH: 7.48 (ref 7.35–7.45)
ARTERIAL BLOOD GAS PO2: 80 MM HG (ref 80–105)
AST SERPL-CCNC: 33 U/L (ref 15–41)
BAND %: 5 %
BASOPHIL % MANUAL: 0 %
BASOPHIL ABSOLUTE MANUAL: 0 X10(3) UL (ref 0–0.1)
BILIRUB SERPL-MCNC: 0.6 MG/DL (ref 0.1–2)
BUN BLD-MCNC: 22 MG/DL (ref 8–20)
CALCIUM BLD-MCNC: 8.4 MG/DL (ref 8.3–10.3)
CALCULATED O2 SATURATION: 97 % (ref 92–100)
CARBOXYHEMOGLOBIN: 1 % SAT (ref 0–3)
CHLORIDE: 104 MMOL/L (ref 101–111)
CO2: 33 MMOL/L (ref 22–32)
CREAT BLD-MCNC: 0.53 MG/DL (ref 0.55–1.02)
EOSINOPHIL % MANUAL: 0 %
EOSINOPHIL ABSOLUTE MANUAL: 0 X10(3) UL (ref 0–0.3)
ERYTHROCYTE [DISTWIDTH] IN BLOOD BY AUTOMATED COUNT: 13.9 % (ref 11.5–16)
GLUCOSE BLD-MCNC: 107 MG/DL (ref 65–99)
GLUCOSE BLD-MCNC: 109 MG/DL (ref 65–99)
GLUCOSE BLD-MCNC: 112 MG/DL (ref 65–99)
GLUCOSE BLD-MCNC: 117 MG/DL (ref 70–99)
HCT VFR BLD AUTO: 38.3 % (ref 34–50)
HGB BLD-MCNC: 12.5 G/DL (ref 12–16)
L/M: 3 L/MIN
LYMPHOCYTE % MANUAL: 11 %
LYMPHOCYTE ABSOLUTE MANUAL: 1.64 X10(3) UL (ref 0.9–4)
M PROTEIN MFR SERPL ELPH: 6.2 G/DL (ref 6.1–8.3)
MCH RBC QN AUTO: 30.3 PG (ref 27–33.2)
MCHC RBC AUTO-ENTMCNC: 32.6 G/DL (ref 31–37)
MCV RBC AUTO: 93 FL (ref 81–100)
METAMYELOCYTE %: 3 %
METAMYELOCYTE ABSOLUTE MANUAL: 0.45 X10(3) UL (ref ?–0.01)
METHEMOGLOBIN: 0.7 % SAT (ref 0.4–1.5)
MONOCYTE % MANUAL: 10 %
MONOCYTE ABSOLUTE MANUAL: 1.49 X10(3) UL (ref 0.1–0.6)
MORPHOLOGY: NORMAL
NEUTROPHIL ABS PRELIM: 11.77 X10 (3) UL (ref 1.3–6.7)
NEUTROPHIL ABSOLUTE MANUAL: 11.32 X10(3) UL (ref 1.3–6.7)
NEUTROPHILS % MANUAL: 71 %
PATIENT TEMPERATURE: 98.9 F
PLATELET # BLD AUTO: 220 10(3)UL (ref 150–450)
PLATELET MORPHOLOGY: NORMAL
POTASSIUM SERPL-SCNC: 3.9 MMOL/L (ref 3.6–5.1)
RBC # BLD AUTO: 4.12 X10(6)UL (ref 3.8–5.1)
RED CELL DISTRIBUTION WIDTH-SD: 47.7 FL (ref 35.1–46.3)
SODIUM SERPL-SCNC: 143 MMOL/L (ref 136–144)
TOTAL CELLS COUNTED: 100
TOTAL HEMOGLOBIN: 12.8 G/DL (ref 11.7–16)
WBC # BLD AUTO: 14.9 X10(3) UL (ref 4–13)

## 2018-01-30 PROCEDURE — 99232 SBSQ HOSP IP/OBS MODERATE 35: CPT | Performed by: HOSPITALIST

## 2018-01-30 PROCEDURE — 05H533Z INSERTION OF INFUSION DEVICE INTO RIGHT SUBCLAVIAN VEIN, PERCUTANEOUS APPROACH: ICD-10-PCS | Performed by: HOSPITALIST

## 2018-01-30 PROCEDURE — 99232 SBSQ HOSP IP/OBS MODERATE 35: CPT | Performed by: OTHER

## 2018-01-30 PROCEDURE — 71045 X-RAY EXAM CHEST 1 VIEW: CPT | Performed by: NURSE PRACTITIONER

## 2018-01-30 PROCEDURE — B546ZZA ULTRASONOGRAPHY OF RIGHT SUBCLAVIAN VEIN, GUIDANCE: ICD-10-PCS | Performed by: HOSPITALIST

## 2018-01-30 RX ORDER — ALPRAZOLAM 0.25 MG/1
0.25 TABLET ORAL 3 TIMES DAILY PRN
Status: DISCONTINUED | OUTPATIENT
Start: 2018-01-30 | End: 2018-02-01

## 2018-01-30 RX ORDER — SODIUM CHLORIDE 0.9 % (FLUSH) 0.9 %
10 SYRINGE (ML) INJECTION EVERY 12 HOURS
Status: DISCONTINUED | OUTPATIENT
Start: 2018-01-30 | End: 2018-02-01

## 2018-01-30 RX ORDER — QUETIAPINE 25 MG/1
12.5 TABLET, FILM COATED ORAL 2 TIMES DAILY PRN
Status: DISCONTINUED | OUTPATIENT
Start: 2018-01-30 | End: 2018-01-31

## 2018-01-30 RX ORDER — ALBUTEROL SULFATE 90 UG/1
1 AEROSOL, METERED RESPIRATORY (INHALATION) 4 TIMES DAILY
Status: DISCONTINUED | OUTPATIENT
Start: 2018-01-30 | End: 2018-02-01

## 2018-01-30 RX ORDER — ALBUTEROL SULFATE 90 UG/1
1 AEROSOL, METERED RESPIRATORY (INHALATION)
Status: DISCONTINUED | OUTPATIENT
Start: 2018-01-30 | End: 2018-01-30

## 2018-01-30 RX ORDER — HALOPERIDOL 5 MG/ML
1 INJECTION INTRAMUSCULAR EVERY 6 HOURS PRN
Status: DISCONTINUED | OUTPATIENT
Start: 2018-01-30 | End: 2018-02-01

## 2018-01-30 NOTE — PROGRESS NOTES
[facility]  Report of Psychiatric Progress Note    Kristyn Paer Patient Status:  Inpatient    1955 MRN [id number]   [facility] 4SW-A Attending Corina Cintron MD   [facility] Day # 6 PCP Unknown Pcp     Date of Admission:  2018 Hx:  1/30/18- She is anxious, but says she does NOT want a SSRI or any scheduled meds for anxiety. She denies any depressed mood or hopelessness or suicidal ideation. Per staff, she is labile and emotional at times.  Not agitated, but needs redirection a lo infusion, 0.2-1.5 mcg/kg/hr (Dosing Weight), Intravenous, Continuous PRN  •  aspirin tab 325 mg, 325 mg, Oral, Daily  •  metoprolol Tartrate (LOPRESSOR) 5 MG/5ML injection 5 mg, 5 mg, Intravenous, Q6H PRN  •  Pantoprazole Sodium (PROTONIX) 40 mg in Sodium situation  Attention and Concentration:   fair  Memory:  intact remote and impaired recent  Language: Intact naming and repetition  Fund of Knowledge: Able to recite president of [place]     Insight: limited  Judgment: limited    Laboratory Data:    Lab Result

## 2018-01-30 NOTE — CM/SW NOTE
MSW was notified by [facility] that the patient will need a PAS screen. MSW notified Mamta eRyes, PAS screener and faxed informational packet to [phone]. PAS screeners have 72 hours to complete an assessment prior to dc.     Abraham Neff, LCS

## 2018-01-30 NOTE — PROGRESS NOTES
[facility]  Progress Note     Reji Jo Patient Status:  Inpatient    1955 MRN [id number]   [facility] 4SW-A Attending Ramu Momin MD   Hosp Day # 6 PCP Unknown Pcp     Chief Complaint: Dyspnea    S: Patient Sarah  Inhalation QID   • metoprolol tartrate  50 mg Oral 2x Daily(Beta Blocker)   • acetaminophen  1,000 mg Intravenous Once   • PEG 3350  17 g Oral Daily   • aspirin  325 mg Oral Daily   • pantoprazole (PROTONIX) IV push  40 mg Intravenous Q24H   • docusate sod

## 2018-01-30 NOTE — PROGRESS NOTES
[facility]  Progress Note     Mya Roscoevirgiliodayami Patient Status:  Inpatient    1955 MRN [id number]   [facility] 4SW-A Attending Emre Casillas MD   Hosp Day # 6 PCP Unknown Pcp     Chief Complaint: Dyspnea    S: Patient    Calm acetaminophen  1,000 mg Intravenous Once   • PEG 3350  17 g Oral Daily   • aspirin  325 mg Oral Daily   • pantoprazole (PROTONIX) IV push  40 mg Intravenous Q24H   • docusate sodium  100 mg Oral BID   • AmLODIPine Besylate  10 mg Oral Daily   • gabapentin

## 2018-01-30 NOTE — PLAN OF CARE
GASTROINTESTINAL - ADULT    • Minimal or absence of nausea and vomiting Progressing    • Maintains or returns to baseline bowel function Progressing          RESPIRATORY - ADULT    • Achieves optimal ventilation and oxygenation Progressing

## 2018-01-30 NOTE — SLP NOTE
ADULT SWALLOWING EVALUATION    ASSESSMENT    ASSESSMENT/OVERALL IMPRESSION:  Pt seen this AM for bedside swallow evaluation. RN approved session. Pt admitted to hospital due to SOB and cough.  Pt required intubation on 1/24/18 upon admission due to worsenin Medical History:   Diagnosis Date   • Alcohol abuse    • Anxiety    • Bipolar affective ([facility] [address]    • Depression    • Extrinsic asthma, unspecified    • Fibroids    • Other and unspecified hyperlipidemia    • Stroke [facility])    • Unspecified essential hypertensi The patient will tolerate regular consistency and thin liquids without overt signs or symptoms of aspiration with 100 % accuracy over 1-2 session(s).     New Goal     FOLLOW UP  Treatment Plan/Recommendations: Dysphagia therapy  Number of Visits to Meet Est

## 2018-01-30 NOTE — CM/SW NOTE
BPCI:  Met with patient at bedside to explain the BPCI/Medicare program. Patient agreed with phone f/u for 3 months from [address] [place] [address] after discharge from [facility]. Patient was enrolled under DRG  193     . BPCI/Medicare Letter and Brochure provided.

## 2018-01-30 NOTE — PROGRESS NOTES
[address] Patient Status:  Inpatient    1955 MRN [id number]   [facility] 4SW-A Attending Rupert Blake MD   [facility] Day # 6 PCP Unknown Pcp     Critical Care Progress Note     Date of Admission: 2018  6:20 Oral, Nightly  •  Enoxaparin Sodium (LOVENOX) 40 MG/0.4ML injection 40 mg, 40 mg, Subcutaneous, Daily  •  MethylPREDNISolone Sodium Succ (Solu-MEDROL) injection 80 mg, 80 mg, Intravenous, Q8H  •  ondansetron HCl (ZOFRAN) injection 4 mg, 4 mg, Intravenous, Output             4310 ml   Net            -3827 ml      Physical Exam:                          General: alert, cooperative, in NAD                          HEENT: oropharynx clear without erythema or exudates, moist mucous membranes                    tamiflu  4. Anxiety/agitation: history of bipolar disorder  -psych following  -wean precedex as able   -haldol PRN   5. Abd pain: LFTs and lipase unremarkable.    - ct a/p with hypodensitities in bilateral kidneys, UA without evidence of infection to sugges

## 2018-01-30 NOTE — CM/SW NOTE
MSW met with the patient and family at bedside per their request to discuss EDMUNDO. PT has yet to evaluate the patient. The patient would like to go to [facility]. MSW made referral via [facility] and called for DON screen.     Damaso Morales LCSW

## 2018-01-30 NOTE — DIETARY NOTE
[facility]    NUTRITION INITIAL ASSESSMENT    Pt does not meet malnutrition criteria.       NUTRITION DIAGNOSIS/PROBLEM:    Inadequate oral intake related inability to meet nutrition via oral route as evidenced by ongoing intubation and sedation- resol FINDINGS:     1. Body Fat/Muscle Mass: not assessed at this time per visual exam.     2. Fluid Accumulation: no edema notes per visual exam.    NUTRITION PRESCRIPTION:  Calories: 0451-7532 calories/day (28-33 calories per kg)  Protein: 72-99 grams protein/

## 2018-01-30 NOTE — PLAN OF CARE
Problem: Impaired Swallowing  Goal: Minimize aspiration risk  Interventions:  - Patient should be alert and upright for all feedings (90 degrees preferred)  - Offer food and liquids at a slow rate  - Straws OK  - Encourage small bites of food and small sip

## 2018-01-30 NOTE — PLAN OF CARE
GENITOURINARY - ADULT    • Absence of urinary retention Not Progressing        NEUROLOGICAL - ADULT    • Achieves stable or improved neurological status Not Progressing          Diabetes/Glucose Control    • Glucose maintained within prescribed range Progr

## 2018-01-31 LAB
ATRIAL RATE: 96 BPM
P AXIS: 81 DEGREES
P-R INTERVAL: 114 MS
Q-T INTERVAL: 332 MS
QRS DURATION: 70 MS
QTC CALCULATION (BEZET): 419 MS
R AXIS: 73 DEGREES
T AXIS: 79 DEGREES
VENTRICULAR RATE: 96 BPM

## 2018-01-31 PROCEDURE — 99232 SBSQ HOSP IP/OBS MODERATE 35: CPT | Performed by: OTHER

## 2018-01-31 PROCEDURE — 99232 SBSQ HOSP IP/OBS MODERATE 35: CPT | Performed by: HOSPITALIST

## 2018-01-31 RX ORDER — IPRATROPIUM BROMIDE AND ALBUTEROL SULFATE 2.5; .5 MG/3ML; MG/3ML
3 SOLUTION RESPIRATORY (INHALATION) EVERY 4 HOURS PRN
Status: DISCONTINUED | OUTPATIENT
Start: 2018-01-31 | End: 2018-02-01

## 2018-01-31 RX ORDER — IPRATROPIUM BROMIDE AND ALBUTEROL SULFATE 2.5; .5 MG/3ML; MG/3ML
SOLUTION RESPIRATORY (INHALATION)
Status: COMPLETED
Start: 2018-01-31 | End: 2018-01-31

## 2018-01-31 RX ORDER — CALCIUM CARBONATE 200(500)MG
500 TABLET,CHEWABLE ORAL 3 TIMES DAILY PRN
Status: DISCONTINUED | OUTPATIENT
Start: 2018-01-31 | End: 2018-02-01

## 2018-01-31 RX ORDER — HYDROCHLOROTHIAZIDE 12.5 MG/1
25 CAPSULE, GELATIN COATED ORAL DAILY
Status: DISCONTINUED | OUTPATIENT
Start: 2018-01-31 | End: 2018-02-01

## 2018-01-31 NOTE — PROGRESS NOTES
Pulmonary Progress Note      NAME: [address] [place] [address] Street: 4504/6238-H - MRN: [id number] - Age: 63year old - : 1955    Assessment/Plan:    1.  Asthma/copd: with exac  - much improved  - off O2 at my visit  - to oral steroids today  - cont gifty murmur. Abdomen: soft, non-tender,  positive BS.    Extremity: no edema   Skin: no new rash   Neuro: normal    Recent Labs   Lab  01/30/18   0443   RBC  4.12   HGB  12.5   HCT  38.3   MCV  93.0   MCH  30.3   MCHC  32.6   RDW  13.9   NEPRELIM  11.77*   WBC

## 2018-01-31 NOTE — PROGRESS NOTES
[facility]  Progress Note     Meliza Gonzalez Patient Status:  Inpatient    1955 MRN [id number]   [facility] 4SW-A Attending Dillon Kirby MD   Hosp Day # 7 PCP Unknown Pcp     Chief Complaint: Dyspnea    S: Patient  Family pantoprazole (PROTONIX) IV push  40 mg Intravenous Q24H   • docusate sodium  100 mg Oral BID   • AmLODIPine Besylate  10 mg Oral Daily   • gabapentin  100 mg Oral TID   • lisinopril  40 mg Oral Daily   • Montelukast Sodium  10 mg Oral Nightly   • enoxapari

## 2018-01-31 NOTE — PLAN OF CARE
GENITOURINARY - ADULT     • Absence of urinary retention Progressing           Impaired Swallowing     • Minimize aspiration risk Progressing           NEUROLOGICAL - ADULT     • Achieves stable or improved neurological status Progressing           PAIN -

## 2018-01-31 NOTE — PLAN OF CARE
Patient started crying and moaning stating her stomach hurts. HR up to 150. Patient very anxious.   Dr. Walters Pulling called and notified and orders placed,  Will try tums and if it does not get better a KUB

## 2018-01-31 NOTE — PROGRESS NOTES
[facility]  Report of Psychiatric Progress Note    Yoel Reina Patient Status:  Inpatient    1955 MRN [id number]   [facility] 4SW-A Attending Anjali Anne MD   [facility] Day # 7 PCP Unknown Pcp     Date of Admission:  2018 emotional at times. Not agitated, but needs redirection a lot. 1/31/18- She felt anxious last night and thought the xanax 0.25mg helped a lot. She refuses to take any Seroquel. She doesn't want to take a SSRI.  She denies any depressed mood today.     % 250 mL infusion, 2.5-15 mg/hr, Intravenous, Continuous  •  acetaminophen (OFIRMEV) infusion 1,000 mg, 1,000 mg, Intravenous, Once  •  PEG 3350 (MIRALAX) powder packet 17 g, 17 g, Oral, Daily  •  Dexmedetomidine HCl in NaCl (PRECEDEX) 400 MCG/100ML premix process: tangential  Thought content: no delusions  Perceptions: no hallucinations  Associations: Intact    Orientation: Alert, oriented person, place and oriented situation  Attention and Concentration:   fair  Memory:  intact remote and impaired recent

## 2018-01-31 NOTE — OCCUPATIONAL THERAPY NOTE
OCCUPATIONAL THERAPY EVALUATION - INPATIENT     Room Number: 9376/3879-Q  Evaluation Date: 1/31/2018  Type of Evaluation: Initial  Presenting Problem: COPD    Physician Order: IP Consult to Occupational Therapy  Reason for Therapy: ADL/IADL Dysfunction and Principal Problem:    COPD exacerbation ([facility] [address]  Active Problems:    Sinus tachycardia    Hyponatremia    Respiratory acidosis    Influenza    Acute delirium    Bipolar affective disorder [facility])      Past Medical History  Past Medical History:   Diagnosis Sebastian Upper extremity strength is within functional limits elbow to digits    COORDINATION  Gross Motor   NOT WFL   Fine Motor    WFL on R, Edema noted on L, limiting full range     ADDITIONAL TESTS    NEUROLOGICAL FINDINGS  Neurological Findings: Coordination - Skilled Therapy Provided: Pt in supine. Pt education on Role of OT, POC, D/C plan. Therapist completed assessment of current function related to mobility and ADL's. Bed mobility at Mod A x2.   Pt sat at EOB with varying levels of assist form S to Max A w The patient is functioning below her previous functional level and would benefit from skilled inpatient OT to address the above deficits, maximizing patient’s ability to return safely to her prior level of function.     Patient Complexity  Occupational Prof

## 2018-01-31 NOTE — PHYSICAL THERAPY NOTE
PHYSICAL THERAPY EVALUATION - INPATIENT     Room Number: 3122/8174-L  Evaluation Date: 1/31/2018  Type of Evaluation: Initial  Physician Order: PT Eval and Treat    Presenting Problem: COPD exacerbation; influenza  Reason for Therapy: Mobility Dysfunct Hip flexion  3-/5  Right Knee extension  3-/5  Left Knee extension  3-/5  Right Dorsiflexion  3/5  Left Dorsiflexion  3-/5    BALANCE  Static Sitting: (P) Fair -  Dynamic Sitting: Fair - (poor)  Static Standing: Poor +  Dynamic Standing: Poor -    ADDITION is a 63year old female admitted on 1/24/2018 for COPD exacerbation/influenza. Pertinent comorbidities and personal factors impacting therapy include ETOH abuse, bipolar disorder and h/o CVA.   In this PT evaluation, the patient presents with the following Goal Comments: Goals established on 1/31/2018

## 2018-01-31 NOTE — RESPIRATORY THERAPY NOTE
Plan to continue routine duoneb treatment PRN. .   On room air, SpO2 is 95%. Breath sounds are diminished.

## 2018-01-31 NOTE — CM/SW NOTE
SW confirmed PAS screener will see patient tomorrow 02/01. Referral to [address] [place] [address] is still pending. Need PT evaluation. ADDENDUM:  NOE noted PT evaluation with recommendation for EDMUNDO, updates sent to [address] [place] [address] for review. NOE will follow up.

## 2018-01-31 NOTE — PLAN OF CARE
GENITOURINARY - ADULT    • Absence of urinary retention Progressing        Impaired Swallowing    • Minimize aspiration risk Progressing        NEUROLOGICAL - ADULT    • Achieves stable or improved neurological status Progressing        PAIN - ADULT    • V

## 2018-02-01 VITALS
SYSTOLIC BLOOD PRESSURE: 107 MMHG | OXYGEN SATURATION: 95 % | HEIGHT: 64 IN | TEMPERATURE: 99 F | WEIGHT: 148.19 LBS | BODY MASS INDEX: 25.3 KG/M2 | RESPIRATION RATE: 18 BRPM | DIASTOLIC BLOOD PRESSURE: 80 MMHG | HEART RATE: 101 BPM

## 2018-02-01 LAB
BAND %: 1 %
BASOPHIL % MANUAL: 0 %
BASOPHIL ABSOLUTE MANUAL: 0 X10(3) UL (ref 0–0.1)
BUN BLD-MCNC: 22 MG/DL (ref 8–20)
CALCIUM BLD-MCNC: 8.5 MG/DL (ref 8.3–10.3)
CHLORIDE: 101 MMOL/L (ref 101–111)
CO2: 29 MMOL/L (ref 22–32)
CREAT BLD-MCNC: 0.61 MG/DL (ref 0.55–1.02)
EOSINOPHIL % MANUAL: 0 %
EOSINOPHIL ABSOLUTE MANUAL: 0 X10(3) UL (ref 0–0.3)
ERYTHROCYTE [DISTWIDTH] IN BLOOD BY AUTOMATED COUNT: 13 % (ref 11.5–16)
GLUCOSE BLD-MCNC: 99 MG/DL (ref 70–99)
HCT VFR BLD AUTO: 44.2 % (ref 34–50)
HGB BLD-MCNC: 15.4 G/DL (ref 12–16)
LYMPHOCYTE % MANUAL: 28 %
LYMPHOCYTE ABSOLUTE MANUAL: 3.84 X10(3) UL (ref 0.9–4)
MCH RBC QN AUTO: 30.6 PG (ref 27–33.2)
MCHC RBC AUTO-ENTMCNC: 34.8 G/DL (ref 31–37)
MCV RBC AUTO: 87.9 FL (ref 81–100)
METAMYELOCYTE %: 1 %
METAMYELOCYTE ABSOLUTE MANUAL: 0.14 X10(3) UL (ref ?–0.01)
MONOCYTE % MANUAL: 10 %
MONOCYTE ABSOLUTE MANUAL: 1.37 X10(3) UL (ref 0.1–0.6)
MORPHOLOGY: NORMAL
NEUTROPHIL ABS PRELIM: 7.7 X10 (3) UL (ref 1.3–6.7)
NEUTROPHIL ABSOLUTE MANUAL: 8.36 X10(3) UL (ref 1.3–6.7)
NEUTROPHILS % MANUAL: 60 %
PLATELET # BLD AUTO: 287 10(3)UL (ref 150–450)
PLATELET MORPHOLOGY: NORMAL
POTASSIUM SERPL-SCNC: 3 MMOL/L (ref 3.6–5.1)
RBC # BLD AUTO: 5.03 X10(6)UL (ref 3.8–5.1)
RED CELL DISTRIBUTION WIDTH-SD: 41.6 FL (ref 35.1–46.3)
SODIUM SERPL-SCNC: 138 MMOL/L (ref 136–144)
TOTAL CELLS COUNTED: 100
WBC # BLD AUTO: 13.7 X10(3) UL (ref 4–13)

## 2018-02-01 PROCEDURE — 99239 HOSP IP/OBS DSCHRG MGMT >30: CPT | Performed by: HOSPITALIST

## 2018-02-01 PROCEDURE — 99232 SBSQ HOSP IP/OBS MODERATE 35: CPT | Performed by: OTHER

## 2018-02-01 RX ORDER — GABAPENTIN 100 MG/1
100 CAPSULE ORAL 3 TIMES DAILY
Qty: 30 CAPSULE | Refills: 0 | Status: ON HOLD | OUTPATIENT
Start: 2018-02-01 | End: 2019-11-09

## 2018-02-01 RX ORDER — AMLODIPINE BESYLATE 5 MG/1
10 TABLET ORAL DAILY
Refills: 0 | Status: SHIPPED | COMMUNITY
Start: 2018-02-01 | End: 2019-11-14 | Stop reason: CLARIF

## 2018-02-01 RX ORDER — ALPRAZOLAM 0.25 MG/1
0.25 TABLET ORAL NIGHTLY PRN
Qty: 30 TABLET | Refills: 0 | Status: ON HOLD | OUTPATIENT
Start: 2018-02-01 | End: 2019-03-04

## 2018-02-01 RX ORDER — METOPROLOL TARTRATE 75 MG/1
75 TABLET, FILM COATED ORAL
Qty: 60 TABLET | Refills: 0 | Status: ON HOLD | OUTPATIENT
Start: 2018-02-01 | End: 2019-02-26 | Stop reason: DRUGHIGH

## 2018-02-01 RX ORDER — PREDNISONE 10 MG/1
TABLET ORAL
Qty: 18 TABLET | Refills: 0 | Status: SHIPPED | OUTPATIENT
Start: 2018-02-01 | End: 2018-08-07 | Stop reason: ALTCHOICE

## 2018-02-01 NOTE — PROGRESS NOTES
[facility]  Progress Note     Eduardo Dhaliwal Patient Status:  Inpatient    1955 MRN [id number]   [facility] 4SW-A Attending Luis Miguel Elizabeth MD   Hosp Day # 8 PCP Unknown Pcp     Chief Complaint: Dyspnea    S: Patient  Cough d pantoprazole (PROTONIX) IV push  40 mg Intravenous Q24H   • docusate sodium  100 mg Oral BID   • AmLODIPine Besylate  10 mg Oral Daily   • gabapentin  100 mg Oral TID   • lisinopril  40 mg Oral Daily   • Montelukast Sodium  10 mg Oral Nightly   • enoxapari

## 2018-02-01 NOTE — CM/SW NOTE
NOE confirmed [facility] has accepted pt for EDMUN[facility]. [facility] clinical updates sent. ADDENDUM:  SW notified pt will be medically cleared for d/c today and confirmed bed availability with Katty liaison at [facility].  RN to call report to [phone], [facility] cl

## 2018-02-01 NOTE — PROGRESS NOTES
Pulmonary Progress Note        NAME: [address] [place] [address] Street: 9226/1381-Y - MRN: [id number] - Age: 63year old - : 1955        SUBJECTIVE: No events overnight, feeling better this afternoon    OBJECTIVE:   18  2105 18  0400 18  08 atraumatic, no cyanosis or edema      Recent Labs   01/30/18  0443 02/01/18  0715   WBC 14.9* 13.7*   HGB 12.5 15.4   MCV 93.0 87.9   .0 287.0   BAND 5 1         Recent Labs   01/30/18  0443 02/01/18  0715    138   K 3.9 3.0*    101   CO

## 2018-02-01 NOTE — PROGRESS NOTES
[facility]  Report of Psychiatric Progress Note    Imelda Pena Patient Status:  Inpatient    1955 MRN [id number]   [facility] 4SW-A Attending Joe Steward MD   [facility] Day # 8 PCP Unknown Pcp     Date of Admission:  2018 night and thought the xanax 0.25mg helped a lot. She refuses to take any Seroquel. She doesn't want to take a SSRI. She denies any depressed mood today. 2/1/18- She says the xanax helps her with anxiety and with sleep. Feels \"fine\" now.  Denies depres HCl (CARDENE) 50 mg in sodium chloride 0.9 % 250 mL infusion, 2.5-15 mg/hr, Intravenous, Continuous  •  acetaminophen (OFIRMEV) infusion 1,000 mg, 1,000 mg, Intravenous, Once  •  PEG 3350 (MIRALAX) powder packet 17 g, 17 g, Oral, Daily  •  Dexmedetomidine \"Fine\"  Affect: Congruent, calm    Thought process: linear  Thought content: no delusions  Perceptions: no hallucinations  Associations: Intact    Orientation: Alert, oriented person, place and oriented situation  Attention and Concentration:   fair  Mem

## 2018-02-01 NOTE — SLP NOTE
SPEECH DAILY NOTE - INPATIENT    ASSESSMENT & PLAN   ASSESSMENT  Pt seen at bedside this AM for speech therapy services. Pt cooperative, pleasant, and alert. Per RN, pt tolerating diet well with no concerns of aspiration.  Trial thin liquids via cup, self p

## 2018-02-02 NOTE — PLAN OF CARE
NURSING DISCHARGE NOTE    Discharged Rehab facility via Ambulance. Accompanied by Support staff  Belongings Taken by patient/family. Report given and paperwork sent to Surend[facility] Gonsalez via ambulance.

## 2018-02-02 NOTE — DISCHARGE SUMMARY
[facility]OSPITAL[facility]  DISCHARGE SUMMARY     Nayan Esparza Patient Status:  Inpatient    1955 MRN [id number]   [facility] 3NE-A Attending No att. providers found   Hosp Day # 8 PCP Unknown Pcp     Date of Admission: 2018  Date of to her intubation for acute respiratory failure. Patient was treated with Tamiflu for influenza required. Patient had CTA of the chest that was negative for PE or infiltrates. Was started on IV steroids.   Patient with auto PEEP and dyssynchrony requirin descriptions):  • Needs follow-up for renal mass  · Needs PT OT  · Is completed Tamiflu  · Prednisone taper  · Continue Singulair  · Needs follow-up with psychiatry  · Needs follow-up with PCP  · Continue meds for tachycardia and hypertension.     Lab/Test capsule  Refills:  0     hydrochlorothiazide 25 MG Tabs  Commonly known as:  HYDRODIURIL      Take 25 mg by mouth daily. Refills:  0     lisinopril 40 MG Tabs  Commonly known as:  PRINIVIL,ZESTRIL      Take 40 mg by mouth daily.    Refills:  0     [facility] Cardiovascular: S1, S2. Regular rate and rhythm. No murmurs, rubs or gallops. ST- brief increase w/ activity    Abdomen: Soft, nontender, nondistended. Positive bowel sounds. No rebound or guarding.  BM overnoc   Neurologic: more alert, strong bilat

## 2018-02-06 NOTE — CM/SW NOTE
[facility].       02/06/18 0900   Discharge disposition   Discharged to: Skilled Nurs   Name of Facillity/Home Care/Hospice Desi Tracy   Patient is Discharged to a [address] [place] [address] Yes   Discharge transportation [employer]

## 2018-05-29 ENCOUNTER — HOSPITAL ENCOUNTER (EMERGENCY)
Facility: HOSPITAL | Age: 63
Discharge: HOME | End: 2018-05-29
Attending: EMERGENCY MEDICINE
Payer: MEDICARE

## 2018-05-29 VITALS
WEIGHT: 140 LBS | HEIGHT: 63 IN | HEART RATE: 87 BPM | RESPIRATION RATE: 20 BRPM | SYSTOLIC BLOOD PRESSURE: 151 MMHG | BODY MASS INDEX: 24.8 KG/M2 | OXYGEN SATURATION: 96 % | DIASTOLIC BLOOD PRESSURE: 101 MMHG

## 2018-05-29 DIAGNOSIS — J45.41 MODERATE PERSISTENT ASTHMA WITH EXACERBATION: Primary | ICD-10-CM

## 2018-05-29 PROCEDURE — 94644 CONT INHLJ TX 1ST HOUR: CPT

## 2018-05-29 PROCEDURE — 96361 HYDRATE IV INFUSION ADD-ON: CPT

## 2018-05-29 PROCEDURE — 99284 EMERGENCY DEPT VISIT MOD MDM: CPT

## 2018-05-29 PROCEDURE — 96374 THER/PROPH/DIAG INJ IV PUSH: CPT

## 2018-05-29 PROCEDURE — 85025 COMPLETE CBC W/AUTO DIFF WBC: CPT | Performed by: EMERGENCY MEDICINE

## 2018-05-29 PROCEDURE — 80053 COMPREHEN METABOLIC PANEL: CPT | Performed by: EMERGENCY MEDICINE

## 2018-05-29 RX ORDER — IPRATROPIUM BROMIDE AND ALBUTEROL SULFATE 2.5; .5 MG/3ML; MG/3ML
3 SOLUTION RESPIRATORY (INHALATION) ONCE
Status: DISCONTINUED | OUTPATIENT
Start: 2018-05-29 | End: 2018-05-29

## 2018-05-29 RX ORDER — SODIUM CHLORIDE 9 MG/ML
INJECTION, SOLUTION INTRAVENOUS ONCE
Status: COMPLETED | OUTPATIENT
Start: 2018-05-29 | End: 2018-05-29

## 2018-05-29 RX ORDER — METHYLPREDNISOLONE SODIUM SUCCINATE 125 MG/2ML
125 INJECTION, POWDER, LYOPHILIZED, FOR SOLUTION INTRAMUSCULAR; INTRAVENOUS ONCE
Status: COMPLETED | OUTPATIENT
Start: 2018-05-29 | End: 2018-05-29

## 2018-05-29 NOTE — ED INITIAL ASSESSMENT (HPI)
Patient arrives to ER with complaint of asthma flare up, states her breathing machine broke this morning, but she did take her rescue inhaler. Also complains of left shoulder pain radiating to her neck X 1 mo, missed her ortho appt.

## 2018-05-29 NOTE — ED PROVIDER NOTES
Patient Seen in: [facility] Emergency Department    History   Patient presents with:  Pain (neurologic)  Dyspnea BARBARA SOB (respiratory)    Stated Complaint: asthma attack    HPI    Patient is a pleasant 73-year-old female smoker, with history of asth is supple and nontender. The trachea is midline. LUNGS: Expiratory wheezing throughout, respirations are unlabored. HEART: Regular rate and rhythm. There are no rubs or gallops. ABDOMEN: The abdomen is soft, nondistended, nontender.  Bowel sounds pres subjectively \"better \". Patient was given a new nebulizer machine. Patient states she has nebulizer solution and a prescription for prednisone at home. Patient was advised to quit smoking.   Patient should monitor her symptoms closely and return immedi

## 2019-02-26 ENCOUNTER — APPOINTMENT (OUTPATIENT)
Dept: GENERAL RADIOLOGY | Facility: HOSPITAL | Age: 64
End: 2019-02-26
Attending: EMERGENCY MEDICINE
Payer: MEDICARE

## 2019-02-26 PROBLEM — F10.20 ALCOHOL DEPENDENCE (HCC): Status: ACTIVE | Noted: 2019-02-26

## 2019-02-26 PROCEDURE — 71045 X-RAY EXAM CHEST 1 VIEW: CPT | Performed by: EMERGENCY MEDICINE

## 2019-02-26 NOTE — ED NOTES
Transport here Plan is to give the patient one breathing treatment she has expiratory wheezes Dr Bethanie Martínez in to re-evaluate and agrees

## 2019-02-26 NOTE — ED NOTES
[facility] ambulance is here patient breathing has improved

## 2019-02-26 NOTE — ED PROVIDER NOTES
Patient Seen in: [facility] Emergency Department    History   Patient presents with:  Eval-P (psychiatric)    Stated Complaint: psych eval    HPI    The patient is a 60-year-old female brought into the emergency department for evaluation after being female sitting upright, slurred speech, with a strong odor of alcohol on her breath. Neuro: No focal neurologic deficit. No facial droop. CN II-XII intact.  Grossly normal and symmetric motor strength and sensation proximally and distally throughout al CONFIRMATION, URINE - Abnormal; Notable for the following components:    Cocaine Urine Presumed Positive (*)     Ethyl Alcohol Urine, Qualitative Positive (*)     All other components within normal limits    Narrative:     Results of the Urine Drug Screen
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
## 2019-02-26 NOTE — ED NOTES
Patient is sitting upright lunch tray given Dr Pita Concepcion in to re evaluate patient breathing has improved  Plan calling [facility]

## 2019-02-26 NOTE — ED NOTES
Patient is ambulatory to the bathroom with assistance she is unstable her right hand seems to shake more which she said is normal Patient has a moist cough called for a neb treatment wheezes noted

## 2019-02-26 NOTE — ED NOTES
Kevin Hollins from [facility] completed the initial assessment Patient has been Petitioned and Certified .  CIWA is 16 she is anxious  She has been drinking since she was 15years old and she does drink every day
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
## 2019-02-27 PROBLEM — F10.20 ALCOHOL USE DISORDER, SEVERE, DEPENDENCE (HCC): Status: ACTIVE | Noted: 2019-02-26

## 2019-02-27 PROBLEM — F32.A DEPRESSION: Status: ACTIVE | Noted: 2019-02-27

## 2019-03-01 ENCOUNTER — HOSPITAL ENCOUNTER (EMERGENCY)
Facility: HOSPITAL | Age: 64
Discharge: NURSING, REHAB OR LONG-TERM CARE | End: 2019-03-01
Attending: EMERGENCY MEDICINE
Payer: MEDICARE

## 2019-03-01 VITALS
OXYGEN SATURATION: 94 % | BODY MASS INDEX: 23 KG/M2 | TEMPERATURE: 97 F | HEART RATE: 89 BPM | SYSTOLIC BLOOD PRESSURE: 119 MMHG | WEIGHT: 130 LBS | DIASTOLIC BLOOD PRESSURE: 87 MMHG | RESPIRATION RATE: 18 BRPM

## 2019-03-01 DIAGNOSIS — M54.12 CERVICAL RADICULOPATHY: Primary | ICD-10-CM

## 2019-03-01 PROCEDURE — 99283 EMERGENCY DEPT VISIT LOW MDM: CPT

## 2019-03-01 RX ORDER — HYDROCODONE BITARTRATE AND ACETAMINOPHEN 5; 325 MG/1; MG/1
1 TABLET ORAL ONCE
Status: COMPLETED | OUTPATIENT
Start: 2019-03-01 | End: 2019-03-01

## 2019-03-01 NOTE — ED INITIAL ASSESSMENT (HPI)
Pt states she went to sleep last night and woke up with numbness/tingling in right arm/hand. Reports \"This happened years ago. \" Pt A&OX4. Took morning oral meds.

## 2019-03-01 NOTE — ED PROVIDER NOTES
Patient Seen in: [facility] Emergency Department    History   Patient presents with:  Numbness Weakness (neurologic)    Stated Complaint: patient reports she woke up with right sided arm numbness/weakness, speech roselia*    HPI    82-year-old woman wh Oral   SpO2 94 %   O2 Device None (Room air)       Current:/87   Pulse 89   Temp 97.3 °F (36.3 °C) (Oral)   Resp 18   Wt 59 kg   SpO2 94%   BMI 23.03 kg/m²         Physical Exam    General:  Vitals as listed.   No acute distress   HEENT: Sclerae anict

## 2019-03-04 ENCOUNTER — HOSPITAL ENCOUNTER (OUTPATIENT)
Dept: PHYSICAL THERAPY | Facility: HOSPITAL | Age: 64
Setting detail: THERAPIES SERIES
Discharge: HOME | End: 2019-03-04
Payer: MEDICARE

## 2019-03-04 PROCEDURE — 97162 PT EVAL MOD COMPLEX 30 MIN: CPT

## 2019-03-04 PROCEDURE — 97116 GAIT TRAINING THERAPY: CPT

## 2019-04-30 PROCEDURE — 87205 SMEAR GRAM STAIN: CPT | Performed by: NURSE PRACTITIONER

## 2019-11-09 ENCOUNTER — HOSPITAL ENCOUNTER (OUTPATIENT)
Facility: HOSPITAL | Age: 64
Setting detail: OBSERVATION
Discharge: HOME HEALTH | End: 2019-11-11
Attending: EMERGENCY MEDICINE | Admitting: INTERNAL MEDICINE
Payer: MEDICARE

## 2019-11-09 ENCOUNTER — APPOINTMENT (OUTPATIENT)
Dept: GENERAL RADIOLOGY | Facility: HOSPITAL | Age: 64
End: 2019-11-09
Attending: EMERGENCY MEDICINE
Payer: MEDICARE

## 2019-11-09 DIAGNOSIS — M54.40 BACK PAIN OF LUMBAR REGION WITH SCIATICA: ICD-10-CM

## 2019-11-09 DIAGNOSIS — J44.1 COPD EXACERBATION (HCC): Primary | ICD-10-CM

## 2019-11-09 DIAGNOSIS — F17.210 CIGARETTE NICOTINE DEPENDENCE WITHOUT COMPLICATION: ICD-10-CM

## 2019-11-09 PROCEDURE — 71046 X-RAY EXAM CHEST 2 VIEWS: CPT | Performed by: EMERGENCY MEDICINE

## 2019-11-09 PROCEDURE — 99220 INITIAL OBSERVATION CARE,LEVL III: CPT | Performed by: HOSPITALIST

## 2019-11-09 RX ORDER — NICOTINE 21 MG/24HR
1 PATCH, TRANSDERMAL 24 HOURS TRANSDERMAL DAILY
Status: DISCONTINUED | OUTPATIENT
Start: 2019-11-09 | End: 2019-11-11

## 2019-11-09 RX ORDER — BISACODYL 10 MG
10 SUPPOSITORY, RECTAL RECTAL
Status: DISCONTINUED | OUTPATIENT
Start: 2019-11-09 | End: 2019-11-11

## 2019-11-09 RX ORDER — AMLODIPINE BESYLATE 5 MG/1
10 TABLET ORAL DAILY
Status: DISCONTINUED | OUTPATIENT
Start: 2019-11-09 | End: 2019-11-11

## 2019-11-09 RX ORDER — METOCLOPRAMIDE HYDROCHLORIDE 5 MG/ML
10 INJECTION INTRAMUSCULAR; INTRAVENOUS EVERY 8 HOURS PRN
Status: DISCONTINUED | OUTPATIENT
Start: 2019-11-09 | End: 2019-11-11

## 2019-11-09 RX ORDER — PANTOPRAZOLE SODIUM 40 MG/1
40 TABLET, DELAYED RELEASE ORAL
Status: DISCONTINUED | OUTPATIENT
Start: 2019-11-10 | End: 2019-11-11

## 2019-11-09 RX ORDER — METHYLPREDNISOLONE SODIUM SUCCINATE 125 MG/2ML
60 INJECTION, POWDER, LYOPHILIZED, FOR SOLUTION INTRAMUSCULAR; INTRAVENOUS EVERY 8 HOURS
Status: DISCONTINUED | OUTPATIENT
Start: 2019-11-10 | End: 2019-11-10

## 2019-11-09 RX ORDER — ENOXAPARIN SODIUM 100 MG/ML
40 INJECTION SUBCUTANEOUS NIGHTLY
Status: DISCONTINUED | OUTPATIENT
Start: 2019-11-09 | End: 2019-11-11

## 2019-11-09 RX ORDER — HYDROCODONE BITARTRATE AND ACETAMINOPHEN 10; 325 MG/1; MG/1
1-2 TABLET ORAL EVERY 4 HOURS PRN
Status: DISCONTINUED | OUTPATIENT
Start: 2019-11-09 | End: 2019-11-11

## 2019-11-09 RX ORDER — ASPIRIN 325 MG
325 TABLET, DELAYED RELEASE (ENTERIC COATED) ORAL DAILY
Status: DISCONTINUED | OUTPATIENT
Start: 2019-11-09 | End: 2019-11-11

## 2019-11-09 RX ORDER — IPRATROPIUM BROMIDE AND ALBUTEROL SULFATE 2.5; .5 MG/3ML; MG/3ML
3 SOLUTION RESPIRATORY (INHALATION)
Status: DISCONTINUED | OUTPATIENT
Start: 2019-11-10 | End: 2019-11-11

## 2019-11-09 RX ORDER — HYDROCHLOROTHIAZIDE 25 MG/1
25 TABLET ORAL DAILY
Status: DISCONTINUED | OUTPATIENT
Start: 2019-11-09 | End: 2019-11-11

## 2019-11-09 RX ORDER — POLYETHYLENE GLYCOL 3350 17 G/17G
17 POWDER, FOR SOLUTION ORAL DAILY PRN
Status: DISCONTINUED | OUTPATIENT
Start: 2019-11-09 | End: 2019-11-11

## 2019-11-09 RX ORDER — METOPROLOL SUCCINATE 100 MG/1
100 TABLET, EXTENDED RELEASE ORAL DAILY
Status: DISCONTINUED | OUTPATIENT
Start: 2019-11-09 | End: 2019-11-11

## 2019-11-09 RX ORDER — DIPHENHYDRAMINE HCL 25 MG
25 CAPSULE ORAL EVERY 6 HOURS PRN
Status: DISCONTINUED | OUTPATIENT
Start: 2019-11-09 | End: 2019-11-11

## 2019-11-09 RX ORDER — IPRATROPIUM BROMIDE AND ALBUTEROL SULFATE 2.5; .5 MG/3ML; MG/3ML
3 SOLUTION RESPIRATORY (INHALATION) EVERY 4 HOURS PRN
Status: DISCONTINUED | OUTPATIENT
Start: 2019-11-09 | End: 2019-11-09

## 2019-11-09 RX ORDER — DOCUSATE SODIUM 100 MG/1
100 CAPSULE, LIQUID FILLED ORAL 2 TIMES DAILY
Status: DISCONTINUED | OUTPATIENT
Start: 2019-11-09 | End: 2019-11-11

## 2019-11-09 RX ORDER — FAMOTIDINE 20 MG/1
20 TABLET ORAL 2 TIMES DAILY
Status: DISCONTINUED | OUTPATIENT
Start: 2019-11-09 | End: 2019-11-11

## 2019-11-09 RX ORDER — CYCLOBENZAPRINE HCL 10 MG
10 TABLET ORAL 3 TIMES DAILY PRN
Status: DISCONTINUED | OUTPATIENT
Start: 2019-11-09 | End: 2019-11-11

## 2019-11-09 RX ORDER — IPRATROPIUM BROMIDE AND ALBUTEROL SULFATE 2.5; .5 MG/3ML; MG/3ML
3 SOLUTION RESPIRATORY (INHALATION) ONCE
Status: COMPLETED | OUTPATIENT
Start: 2019-11-09 | End: 2019-11-09

## 2019-11-09 RX ORDER — IPRATROPIUM BROMIDE AND ALBUTEROL SULFATE 2.5; .5 MG/3ML; MG/3ML
3 SOLUTION RESPIRATORY (INHALATION) ONCE
Status: DISCONTINUED | OUTPATIENT
Start: 2019-11-09 | End: 2019-11-11

## 2019-11-09 RX ORDER — PREDNISONE 20 MG/1
60 TABLET ORAL ONCE
Status: COMPLETED | OUTPATIENT
Start: 2019-11-09 | End: 2019-11-09

## 2019-11-09 RX ORDER — MELATONIN
100 DAILY
Status: DISCONTINUED | OUTPATIENT
Start: 2019-11-09 | End: 2019-11-11

## 2019-11-09 RX ORDER — ONDANSETRON 2 MG/ML
4 INJECTION INTRAMUSCULAR; INTRAVENOUS EVERY 6 HOURS PRN
Status: DISCONTINUED | OUTPATIENT
Start: 2019-11-09 | End: 2019-11-11

## 2019-11-09 RX ORDER — SODIUM PHOSPHATE, DIBASIC AND SODIUM PHOSPHATE, MONOBASIC 7; 19 G/133ML; G/133ML
1 ENEMA RECTAL ONCE AS NEEDED
Status: DISCONTINUED | OUTPATIENT
Start: 2019-11-09 | End: 2019-11-11

## 2019-11-09 RX ORDER — HYDROCODONE BITARTRATE AND ACETAMINOPHEN 5; 325 MG/1; MG/1
2 TABLET ORAL ONCE
Status: COMPLETED | OUTPATIENT
Start: 2019-11-09 | End: 2019-11-09

## 2019-11-09 RX ORDER — LISINOPRIL 40 MG/1
40 TABLET ORAL DAILY
Status: DISCONTINUED | OUTPATIENT
Start: 2019-11-09 | End: 2019-11-11

## 2019-11-10 PROBLEM — F10.20 UNCOMPLICATED ALCOHOL DEPENDENCE (HCC): Status: ACTIVE | Noted: 2019-02-26

## 2019-11-10 PROCEDURE — 99225 SUBSEQUENT OBSERVATION CARE: CPT | Performed by: INTERNAL MEDICINE

## 2019-11-10 RX ORDER — METHYLPREDNISOLONE SODIUM SUCCINATE 125 MG/2ML
60 INJECTION, POWDER, LYOPHILIZED, FOR SOLUTION INTRAMUSCULAR; INTRAVENOUS EVERY 12 HOURS
Status: DISCONTINUED | OUTPATIENT
Start: 2019-11-10 | End: 2019-11-11

## 2019-11-10 NOTE — PROGRESS NOTES
11/10/19 1122   Clinical Encounter Type   Visited With Patient   Routine Visit Introduction  ( spoke to patient bout preparing a health care power of  document. Patient not able to read the form without her glasses.   Patient waiting for

## 2019-11-10 NOTE — PROGRESS NOTES
11/10/19 1700   Clinical Encounter Type   Visited With Patient  (Patient stated daughter would complete the POA for Healthcare form.   If  is needed to be a witness, nurse will page .)   Continue Visiting No  ( remains available a

## 2019-11-10 NOTE — PROGRESS NOTES
[facility]  Progress Note     Meliza Gonzalez Patient Status:  Observation    1955 MRN [id number]   [facility] 5NW-A Attending Keo Wills DO   Hosp Day # 0 PCP PHYSICIAN NONSTAFF     Chief Complaint: dyspnea    S: Patient amLODIPine Besylate  10 mg Oral Daily   • aspirin  325 mg Oral Daily   • famoTIDine  20 mg Oral BID   • Fluticasone Furoate-Vilanterol  1 puff Inhalation Daily   • hydrochlorothiazide  25 mg Oral Daily   • lisinopril  40 mg Oral Daily   • Metoprolol Succin

## 2019-11-10 NOTE — BH PROGRESS NOTE
[facility] Resource Referral Counselor Note    Eduardo Dhaliwal Patient Status:  Observation    1955 MRN [id number]   [facility] 5NW-A Attending Boy Romo, DO   Hosp Day # 0 PCP PHYSICIAN NONSTAFF       S(subjective) Laretta Runner

## 2019-11-10 NOTE — ED NOTES
RN spoke with patient's daughter Jo Navas at patient's request and updated her on patient's condition and plan for admission. Given room assignment information.

## 2019-11-10 NOTE — H&P
[facility]  History and Physical     Pastoryeni Arviuzar Patient Status:  Emergency    1955 MRN [id number]   Location [facility] Attending Suzette Albert MD   Hosp Day # 0 PCP PHYSICIAN NONSTAFF     Chief Complai No current facility-administered medications on file prior to encounter.    predniSONE 20 MG Oral Tab, 2 tablets daily x 5 days, Disp: 10 tablet, Rfl: 0  atorvastatin 20 MG Oral Tab, Take 20 mg by mouth nightly., Disp: , Rfl:   benzonatate 100 MG Oral Cap, MEQ Oral Tab CR, Take by mouth., Disp: , Rfl:   gabapentin 100 MG Oral Cap, Take 1 capsule (100 mg total) by mouth 3 (three) times daily. , Disp: 30 capsule, Rfl: 0  AmLODIPine Besylate 5 MG Oral Tab, Take 10 mg by mouth daily.   , Disp: , Rfl: 0  Fluticason Estimated Creatinine Clearance: 54.8 mL/min (based on SCr of 0.88 mg/dL). No results for input(s): PTP, INR in the last 168 hours. No results for input(s): TROP, CK in the last 168 hours. Imaging: Imaging data reviewed in Epic.       ASSESSME

## 2019-11-10 NOTE — DIETARY NOTE
[place]     Admitting diagnosis:  COPD exacerbation ([facility] [address] [J44.1]  Cigarette nicotine dependence without complication [P32.765]  Back pain of lumbar region with sciatica [M54.40]    Ht: 160 cm (5' 3\")  Wt:

## 2019-11-10 NOTE — ED PROVIDER NOTES
Patient Seen in: [facility] Emergency Department      History   Patient presents with:  Abdomen/Flank Pain (GI/)    Stated Complaint: flank pain     HPI    30-year-old woman who has a history of asthma/COPD and comes emergency department complaini 11/09/19 1923 99 %   O2 Device 11/09/19 1931 None (Room air)       Current:BP (!) 182/119   Pulse 104   Temp 97.7 °F (36.5 °C) (Temporal)   Resp 21   Ht 160 cm (5' 3\")   Wt 59 kg   SpO2 100%   BMI 23.03 kg/m²         Physical Exam    General:  Vitals as l [facility]       Admission disposition: 11/9/2019  8:30 PM         Patient given prednisone and serial DuJoseb's    Case discussed with Dr. Sunny Thibodeaux from the [facility] hospitalist service who will admit the patient.         Disposition and Plan     Clinical Imp

## 2019-11-10 NOTE — PLAN OF CARE
Problem: Patient/Family Goals  Goal: Patient/Family Long Term Goal  Description  Patient's Long Term Goal:  Discharge home    Interventions:  - consults  - PT eval     - See additional Care Plan goals for specific interventions   Outcome: Progressing  Go

## 2019-11-10 NOTE — PHYSICAL THERAPY NOTE
Orders received, attempted to see patient for PT Evaluation; pt refused/declined at this time, pt reported that she just got back to bed & wants to rest/sleep at this time. Will see patient as soon as schedule permits, RN aware.

## 2019-11-10 NOTE — ED INITIAL ASSESSMENT (HPI)
Patient presents for evaluation of multiple complaints. She reports she woke up this morning and had right flank pain that radiates all the way down her right leg. She also reports a productive cough with white sputum.  She states she did a breathing treatm

## 2019-11-10 NOTE — PHYSICAL THERAPY NOTE
PHYSICAL THERAPY EVALUATION - INPATIENT     Room Number: 684/698-V  Evaluation Date: 11/10/2019  Type of Evaluation: Initial  Physician Order: PT Eval and Treat    Presenting Problem: abd/flank pain  Reason for Therapy: Mobility Dysfunction and Disch home.      SUBJECTIVE  \"I can do it with you\" referring to PT    Patient self-stated goal is return home, get stronger & be more steady on her feet & not fall again    OBJECTIVE     Fall Risk: High fall risk    WEIGHT BEARING RESTRICTION  Weight Bearing actual; above per FIM score per dept policy)  Distance (ft): 90, 12 ft x 2 without the RW per pt insistence  Assistive Device: Rolling walker  Pattern: (Decr trixie/step length/heel-toe, (+)knee buckling 3x)          Skilled Therapy Provided: Received pt therapy include chronic back pain, COPD.   In this PT evaluation, the patient presents with the following impairments: decreased strength of B LE, decreased independence during bed mobility/transfers/gait, decreased activity tolerance & endurance, decreased

## 2019-11-11 VITALS
DIASTOLIC BLOOD PRESSURE: 72 MMHG | SYSTOLIC BLOOD PRESSURE: 126 MMHG | HEART RATE: 96 BPM | WEIGHT: 135.19 LBS | TEMPERATURE: 98 F | HEIGHT: 63 IN | RESPIRATION RATE: 16 BRPM | BODY MASS INDEX: 23.95 KG/M2 | OXYGEN SATURATION: 95 %

## 2019-11-11 PROCEDURE — 99217 OBSERVATION CARE DISCHARGE: CPT | Performed by: INTERNAL MEDICINE

## 2019-11-11 RX ORDER — PREDNISONE 20 MG/1
40 TABLET ORAL
Status: DISCONTINUED | OUTPATIENT
Start: 2019-11-11 | End: 2019-11-11

## 2019-11-11 RX ORDER — PREDNISONE 20 MG/1
20 TABLET ORAL DAILY
Qty: 7 TABLET | Refills: 0 | Status: ON HOLD | OUTPATIENT
Start: 2019-11-11 | End: 2019-12-31

## 2019-11-11 RX ORDER — BENZONATATE 100 MG/1
100 CAPSULE ORAL 3 TIMES DAILY PRN
Qty: 21 CAPSULE | Refills: 0 | Status: ON HOLD | OUTPATIENT
Start: 2019-11-11 | End: 2019-12-31

## 2019-11-11 NOTE — DISCHARGE SUMMARY
[facility] HOSPITALIST  DISCHARGE SUMMARY     Rebekah Cao Patient Status:  Observation    1955 MRN [id number]   [facility] 5NW-A Attending Yahir Curtis, DO   Hosp Day # 0 PCP PHYSICIAN NONSTAFF     Date of Admission: 2019  Date · n/a    Consultants:  • n/a    Discharge Medication List:     Discharge Medications      CHANGE how you take these medications      Instructions Prescription details   predniSONE 20 MG Tabs  Commonly known as:  Jenifer Henriquez  What changed:    · additional i needed for Pain (takes for severe back and leg pain. ). Takes for lumbar pain and leg pain. Has been taking 5 years. Refills:  0     lisinopril 40 MG Tabs      Take 40 mg by mouth daily.    Refills:  0     Metoprolol Succinate  MG Tb24  Commonly know guarding. Neurologic: No focal neurological deficits. Musculoskeletal: Moves all extremities. Extremities: No edema.   -----------------------------------------------------------------------------------------------  PATIENT DISCHARGE INSTRUCTIONS: See e

## 2019-11-11 NOTE — RESPIRATORY THERAPY NOTE
MDI's given and instructed this AM. Pt takes same medications at home and demonstrates understanding very well. RN to administer further doses.

## 2019-11-11 NOTE — CM/SW NOTE
MSW acknowledged recommendation for [place] [id number] services. MSW paged [facility]  P:[phone]  P:[phone] with referral.  They will meet with the patient at bedside to explain services, provide choice, and financial disclosure.     Tera Monreal

## 2019-11-11 NOTE — OCCUPATIONAL THERAPY NOTE
OCCUPATIONAL THERAPY EVALUATION - INPATIENT     Room Number: 986/868-J  Evaluation Date: 11/11/2019  Type of Evaluation: Initial  Presenting Problem: COPD exacerbation    Physician Order: IP Consult to Occupational Therapy  Reason for Therapy: ADL/IADL Dys of Function: Pt reports being modified independent gait with the RW at home. Pt reports IND with ADLs PTA. Pt now lives at daughter's home, bedroom on the 1st floor; pt reports daughter works from home. SUBJECTIVE   \"My whole right side is weaker. \" 32.79%  Standardized Score (AM-PAC Scale): 44.27  CMS Modifier (G-Code): CJ    FUNCTIONAL TRANSFER ASSESSMENT  Supine to Sit : Independent  Sit to Stand: Supervision    Skilled Therapy Provided: Pt received semi-supine in bed.  Pt instructed in bed mobility medical or therapy records    Specific performance deficits impacting engagement in ADL/IADL LOW  1 - 3 performance deficits    Client Assessment/Performance Deficits MODERATE - Comorbidities and min to mod modifications of tasks    Clinical Decision Maris

## 2019-11-11 NOTE — PROGRESS NOTES
Pt is a 59 y/o female admitted with SOB due to COPD exacerbation. on po steroids. no sob,pain,fever,02 walk done. pt didn't need any 02.up as tolerated. encouraged ambulation,IS and acapella. tolerating diet. generalized pain. heat pack requested and given. poc up

## 2019-11-11 NOTE — PHYSICAL THERAPY NOTE
PHYSICAL THERAPY TREATMENT NOTE - INPATIENT    Room Number: 462/363-G     Session: 1   Number of Visits to Meet Established Goals: 3    Presenting Problem: abd/flank pain    Problem List  Principal Problem:    COPD exacerbation ([address]  Active Problems: much difficulty does the patient currently have. ..  -   Turning over in bed (including adjusting bedclothes, sheets and blankets)?: None   -   Sitting down on and standing up from a chair with arms (e.g., wheelchair, bedside commode, etc.): None   -   Movi see OT note     Position Sitting     Repetitions   10   Sets   1     Patient End of Session: Up in chair;Needs met;Call light within reach;RN aware of session/findings; All patient questions and concerns addressed    ASSESSMENT     Patient currently does no

## 2019-11-11 NOTE — PROGRESS NOTES
NURSING DISCHARGE NOTE    Discharged Home via Wheelchair. Accompanied by Family member and Support staff  Belongings Taken by patient/family. Pt discharged to home. discharge instruction given to pt,verbalized understanding. PIV removed. pts daughter

## 2019-11-12 ENCOUNTER — PATIENT OUTREACH (OUTPATIENT)
Dept: CASE MANAGEMENT | Age: 64
End: 2019-11-12

## 2019-11-12 DIAGNOSIS — M54.40 BACK PAIN OF LUMBAR REGION WITH SCIATICA: ICD-10-CM

## 2019-11-12 DIAGNOSIS — F17.210 CIGARETTE NICOTINE DEPENDENCE WITHOUT COMPLICATION: ICD-10-CM

## 2019-11-12 DIAGNOSIS — Z02.9 ENCOUNTERS FOR UNSPECIFIED ADMINISTRATIVE PURPOSE: ICD-10-CM

## 2019-11-12 DIAGNOSIS — J44.1 COPD EXACERBATION (HCC): ICD-10-CM

## 2019-11-12 PROCEDURE — 1111F DSCHRG MED/CURRENT MED MERGE: CPT

## 2019-11-12 NOTE — CM/SW NOTE
11/12/19 0801   Discharge disposition   Expected discharge disposition Home-Health   Name of Facillity/Home Care/Hospice Residential

## 2019-11-12 NOTE — PROGRESS NOTES
DANIEL LM requesting a call back with a condition update.  NC also requested patient call [facility] clinic at [phone] to confirm appointment on 11/14/19 at 2:00 pm.

## 2019-11-12 NOTE — PROGRESS NOTES
Initial Post Discharge Follow Up   Discharge Date: 11/11/19  Contact Date: 11/12/2019    Consent Verification:  Assessment Completed With: Patient  HIPAA Verified?   Yes    Discharge Dx:     Acute COPD exacerbation  Nicotine use and abuse  Chronic Narcot Cap Take 1 capsule (100 mg total) by mouth 3 (three) times daily as needed for cough. 21 capsule 0   • predniSONE 20 MG Oral Tab Take 1 tablet (20 mg total) by mouth daily.  Starting 11/12, Take 40 mg for 2 days THEN 20 mg for 2 days, THEN 10 mg for 2 days, yes  o If so, were these medication changes discussed with you prior to leaving the hospital? yes  • (NCM) If a new medication was prescribed:    o Was the new medication’s purpose & side effects reviewed?  yes  o Do you have any questions about your new me worsening COPD symptoms? Not sure   Do you know when to call with COPD symptoms? Yes   Do you have any of the following potential risk factors for COPD in your home environment?   Primary or secondary tobacco smoke   no, patient reports that she has not had reviewed/discussed/and reconciled against outpatient medications with patient,  and orders reviewed and discussed. Any changes or updates to medications and or orders sent to PCP.      For patients with TCC appointments:     NCM offered sooner TCC appointme

## 2019-11-14 ENCOUNTER — OFFICE VISIT (OUTPATIENT)
Dept: INTERNAL MEDICINE CLINIC | Facility: CLINIC | Age: 64
End: 2019-11-14
Payer: MEDICARE

## 2019-11-14 VITALS
RESPIRATION RATE: 16 BRPM | SYSTOLIC BLOOD PRESSURE: 118 MMHG | DIASTOLIC BLOOD PRESSURE: 86 MMHG | TEMPERATURE: 98 F | BODY MASS INDEX: 23.57 KG/M2 | HEART RATE: 74 BPM | OXYGEN SATURATION: 99 % | HEIGHT: 63 IN | WEIGHT: 133 LBS

## 2019-11-14 DIAGNOSIS — I10 BENIGN ESSENTIAL HTN: ICD-10-CM

## 2019-11-14 DIAGNOSIS — F31.9 BIPOLAR AFFECTIVE DISORDER, REMISSION STATUS UNSPECIFIED (HCC): ICD-10-CM

## 2019-11-14 DIAGNOSIS — M54.40 BACK PAIN OF LUMBAR REGION WITH SCIATICA: ICD-10-CM

## 2019-11-14 DIAGNOSIS — F10.10 ALCOHOL ABUSE: ICD-10-CM

## 2019-11-14 DIAGNOSIS — J44.9 COPD, SEVERE (HCC): ICD-10-CM

## 2019-11-14 DIAGNOSIS — J44.1 COPD EXACERBATION (HCC): Primary | ICD-10-CM

## 2019-11-14 DIAGNOSIS — F17.210 CIGARETTE NICOTINE DEPENDENCE WITHOUT COMPLICATION: ICD-10-CM

## 2019-11-14 PROCEDURE — 1111F DSCHRG MED/CURRENT MED MERGE: CPT | Performed by: CLINICAL NURSE SPECIALIST

## 2019-11-14 PROCEDURE — 99495 TRANSJ CARE MGMT MOD F2F 14D: CPT | Performed by: CLINICAL NURSE SPECIALIST

## 2019-11-14 RX ORDER — QUETIAPINE 25 MG/1
25 TABLET, FILM COATED ORAL NIGHTLY
COMMUNITY
End: 2020-07-21

## 2019-11-14 RX ORDER — AMLODIPINE BESYLATE 10 MG/1
10 TABLET ORAL DAILY
Status: ON HOLD | COMMUNITY
End: 2021-12-24

## 2019-11-14 NOTE — PROGRESS NOTES
TRANSITIONAL CARE CLINIC PHARMACIST MEDICATION RECONCILIATION        [facility] MRN [id number]    1955 PCP PHYSICIAN NONSTAFF       Comments: Medication history completed in [address] [place] [address] by pharmacist with patient.   The following (three) times daily as needed. • aspirin 325 MG Oral Tab EC Take 325 mg by mouth daily.    • Pantoprazole Sodium 40 MG Oral Tab EC Take 40 mg by mouth every morning before breakfast.     No facility-administered encounter medications on file as of 11/14

## 2019-11-14 NOTE — PROGRESS NOTES
Elana Tobias 6      HISTORY   CHIEF COMPLAINT: post hospital follow up visit  HPI: Vickey Cockayne is a 60year old female here today for follow up after being hospitalized for SOB, cough and back pain.   Aissatou Rosado daily., Disp: , Rfl:   HYDROcodone-acetaminophen  MG Oral Tab, Take  tablets by mouth every 3 (three) hours as needed for Pain (takes for severe back and leg pain. ). Takes for lumbar pain and leg pain. Has been taking 5 years.   , Disp: , Rfl: 0 disease) ([address]    • Deep vein thrombosis ([address] 01/2019    right leg   • Depression    • Esophageal reflux    • Essential hypertension    • Extrinsic asthma, unspecified    • Fibroids    • High blood pressure    • Hypernatremia    • Influenza A    • Migrain AM    PHOS 2.8 01/29/2018 04:14 AM    ALB 3.6 11/09/2019 07:27 PM    ALT 23 11/09/2019 07:27 PM    AST 19 11/09/2019 07:27 PM    INR 1.11 01/24/2018 06:38 AM    A1C 5.9 (H) 11/09/2019 07:27 PM         There is no immunization history on file for this patie Besylate 10 MG Oral Tab, Take 10 mg by mouth daily. , Disp: , Rfl:   QUEtiapine Fumarate 25 MG Oral Tab, Take 25 mg by mouth nightly., Disp: , Rfl:   benzonatate 100 MG Oral Cap, Take 1 capsule (100 mg total) by mouth 3 (three) times daily as needed for cou Maintenance:  Annual Physical due on 09/27/1960  Tobacco Cessation Counseling 2 Years due on 09/27/1969  Pap Smear,3 Years due on 09/27/1988  Mammogram due on 09/27/1997  FIT Colorectal Screening due on 09/27/2007  Colonoscopy due on 09/27/2007  Pneumococc Pulm: 11/19/19  4.   [facility] referral     DAVIDA Correa, 11/14/2019  [facility]  [phone]

## 2019-11-14 NOTE — PATIENT INSTRUCTIONS
Patient Instructions:    1. We ordered a refill of spiriva for you to  from your [facility]. 2.  Follow up with your Primary Care Physician as previously scheduled on 11/19/19   Dr. Venkat Mata    3.   You will

## 2019-12-26 ENCOUNTER — HOSPITAL ENCOUNTER (INPATIENT)
Facility: HOSPITAL | Age: 64
LOS: 4 days | Discharge: HOME HEALTH | DRG: 191 | End: 2019-12-31
Attending: HOSPITALIST | Admitting: STUDENT IN AN ORGANIZED HEALTH CARE EDUCATION/TRAINING PROGRAM
Payer: MEDICARE

## 2019-12-26 ENCOUNTER — APPOINTMENT (OUTPATIENT)
Dept: GENERAL RADIOLOGY | Facility: HOSPITAL | Age: 64
DRG: 191 | End: 2019-12-26
Attending: EMERGENCY MEDICINE
Payer: MEDICARE

## 2019-12-26 DIAGNOSIS — J18.9 COMMUNITY ACQUIRED PNEUMONIA OF LEFT LOWER LOBE OF LUNG: ICD-10-CM

## 2019-12-26 DIAGNOSIS — J44.1 COPD EXACERBATION (HCC): Primary | ICD-10-CM

## 2019-12-26 LAB
ADENOVIRUS PCR:: NEGATIVE
ALBUMIN SERPL-MCNC: 3.4 G/DL (ref 3.4–5)
ALBUMIN/GLOB SERPL: 0.9 {RATIO} (ref 1–2)
ALP LIVER SERPL-CCNC: 82 U/L (ref 50–130)
ALT SERPL-CCNC: 21 U/L (ref 13–56)
ANION GAP SERPL CALC-SCNC: 5 MMOL/L (ref 0–18)
AST SERPL-CCNC: 32 U/L (ref 15–37)
B PERT DNA SPEC QL NAA+PROBE: NEGATIVE
BASOPHILS # BLD AUTO: 0.02 X10(3) UL (ref 0–0.2)
BASOPHILS NFR BLD AUTO: 0.4 %
BILIRUB SERPL-MCNC: 0.5 MG/DL (ref 0.1–2)
BUN BLD-MCNC: 8 MG/DL (ref 7–18)
BUN/CREAT SERPL: 11 (ref 10–20)
C PNEUM DNA SPEC QL NAA+PROBE: NEGATIVE
CALCIUM BLD-MCNC: 8.3 MG/DL (ref 8.5–10.1)
CHLORIDE SERPL-SCNC: 110 MMOL/L (ref 98–112)
CO2 SERPL-SCNC: 25 MMOL/L (ref 21–32)
CORONAVIRUS 229E PCR:: NEGATIVE
CORONAVIRUS HKU1 PCR:: NEGATIVE
CORONAVIRUS NL63 PCR:: NEGATIVE
CORONAVIRUS OC43 PCR:: NEGATIVE
CREAT BLD-MCNC: 0.73 MG/DL (ref 0.55–1.02)
DEPRECATED RDW RBC AUTO: 44.9 FL (ref 35.1–46.3)
EOSINOPHIL # BLD AUTO: 0.05 X10(3) UL (ref 0–0.7)
EOSINOPHIL NFR BLD AUTO: 0.9 %
ERYTHROCYTE [DISTWIDTH] IN BLOOD BY AUTOMATED COUNT: 13.5 % (ref 11–15)
FLUAV RNA SPEC QL NAA+PROBE: NEGATIVE
FLUBV RNA SPEC QL NAA+PROBE: NEGATIVE
GLOBULIN PLAS-MCNC: 3.6 G/DL (ref 2.8–4.4)
GLUCOSE BLD-MCNC: 99 MG/DL (ref 70–99)
HCT VFR BLD AUTO: 43.9 % (ref 35–48)
HGB BLD-MCNC: 14.7 G/DL (ref 12–16)
IMM GRANULOCYTES # BLD AUTO: 0.02 X10(3) UL (ref 0–1)
IMM GRANULOCYTES NFR BLD: 0.4 %
LYMPHOCYTES # BLD AUTO: 2.09 X10(3) UL (ref 1–4)
LYMPHOCYTES NFR BLD AUTO: 39.7 %
M PROTEIN MFR SERPL ELPH: 7 G/DL (ref 6.4–8.2)
MCH RBC QN AUTO: 30.2 PG (ref 26–34)
MCHC RBC AUTO-ENTMCNC: 33.5 G/DL (ref 31–37)
MCV RBC AUTO: 90.3 FL (ref 80–100)
METAPNEUMOVIRUS PCR:: NEGATIVE
MONOCYTES # BLD AUTO: 0.7 X10(3) UL (ref 0.1–1)
MONOCYTES NFR BLD AUTO: 13.3 %
MYCOPLASMA PNEUMONIA PCR:: NEGATIVE
NEUTROPHILS # BLD AUTO: 2.39 X10 (3) UL (ref 1.5–7.7)
NEUTROPHILS # BLD AUTO: 2.39 X10(3) UL (ref 1.5–7.7)
NEUTROPHILS NFR BLD AUTO: 45.3 %
OSMOLALITY SERPL CALC.SUM OF ELEC: 288 MOSM/KG (ref 275–295)
PARAINFLUENZA 1 PCR:: NEGATIVE
PARAINFLUENZA 2 PCR:: NEGATIVE
PARAINFLUENZA 3 PCR:: NEGATIVE
PARAINFLUENZA 4 PCR:: NEGATIVE
PLATELET # BLD AUTO: 232 10(3)UL (ref 150–450)
POTASSIUM SERPL-SCNC: 4.3 MMOL/L (ref 3.5–5.1)
RBC # BLD AUTO: 4.86 X10(6)UL (ref 3.8–5.3)
RHINOVIRUS/ENTERO PCR:: NEGATIVE
RSV RNA SPEC QL NAA+PROBE: NEGATIVE
SODIUM SERPL-SCNC: 140 MMOL/L (ref 136–145)
WBC # BLD AUTO: 5.3 X10(3) UL (ref 4–11)

## 2019-12-26 PROCEDURE — 99223 1ST HOSP IP/OBS HIGH 75: CPT | Performed by: STUDENT IN AN ORGANIZED HEALTH CARE EDUCATION/TRAINING PROGRAM

## 2019-12-26 PROCEDURE — 71046 X-RAY EXAM CHEST 2 VIEWS: CPT | Performed by: EMERGENCY MEDICINE

## 2019-12-26 RX ORDER — IPRATROPIUM BROMIDE AND ALBUTEROL SULFATE 2.5; .5 MG/3ML; MG/3ML
3 SOLUTION RESPIRATORY (INHALATION) ONCE
Status: COMPLETED | OUTPATIENT
Start: 2019-12-26 | End: 2019-12-26

## 2019-12-26 RX ORDER — METHYLPREDNISOLONE SODIUM SUCCINATE 125 MG/2ML
125 INJECTION, POWDER, LYOPHILIZED, FOR SOLUTION INTRAMUSCULAR; INTRAVENOUS ONCE
Status: COMPLETED | OUTPATIENT
Start: 2019-12-26 | End: 2019-12-26

## 2019-12-26 RX ORDER — LEVOFLOXACIN 5 MG/ML
750 INJECTION, SOLUTION INTRAVENOUS ONCE
Status: DISCONTINUED | OUTPATIENT
Start: 2019-12-26 | End: 2019-12-26

## 2019-12-26 RX ORDER — AMLODIPINE BESYLATE 5 MG/1
10 TABLET ORAL ONCE
Status: COMPLETED | OUTPATIENT
Start: 2019-12-26 | End: 2019-12-26

## 2019-12-27 PROBLEM — F17.213 CIGARETTE NICOTINE DEPENDENCE WITH WITHDRAWAL: Status: ACTIVE | Noted: 2019-11-09

## 2019-12-27 PROBLEM — J18.9 COMMUNITY ACQUIRED PNEUMONIA OF LEFT LOWER LOBE OF LUNG: Status: ACTIVE | Noted: 2019-12-27

## 2019-12-27 LAB
ANION GAP SERPL CALC-SCNC: 11 MMOL/L (ref 0–18)
BASOPHILS # BLD AUTO: 0 X10(3) UL (ref 0–0.2)
BASOPHILS NFR BLD AUTO: 0 %
BUN BLD-MCNC: 10 MG/DL (ref 7–18)
BUN/CREAT SERPL: 10.8 (ref 10–20)
CALCIUM BLD-MCNC: 8 MG/DL (ref 8.5–10.1)
CHLORIDE SERPL-SCNC: 109 MMOL/L (ref 98–112)
CO2 SERPL-SCNC: 22 MMOL/L (ref 21–32)
CREAT BLD-MCNC: 0.93 MG/DL (ref 0.55–1.02)
DEPRECATED RDW RBC AUTO: 46.4 FL (ref 35.1–46.3)
EOSINOPHIL # BLD AUTO: 0 X10(3) UL (ref 0–0.7)
EOSINOPHIL NFR BLD AUTO: 0 %
ERYTHROCYTE [DISTWIDTH] IN BLOOD BY AUTOMATED COUNT: 13.5 % (ref 11–15)
GLUCOSE BLD-MCNC: 219 MG/DL (ref 70–99)
HCT VFR BLD AUTO: 41 % (ref 35–48)
HGB BLD-MCNC: 13.3 G/DL (ref 12–16)
IMM GRANULOCYTES # BLD AUTO: 0.02 X10(3) UL (ref 0–1)
IMM GRANULOCYTES NFR BLD: 0.4 %
LYMPHOCYTES # BLD AUTO: 0.65 X10(3) UL (ref 1–4)
LYMPHOCYTES NFR BLD AUTO: 13.3 %
MCH RBC QN AUTO: 30.2 PG (ref 26–34)
MCHC RBC AUTO-ENTMCNC: 32.4 G/DL (ref 31–37)
MCV RBC AUTO: 93 FL (ref 80–100)
MONOCYTES # BLD AUTO: 0.07 X10(3) UL (ref 0.1–1)
MONOCYTES NFR BLD AUTO: 1.4 %
NEUTROPHILS # BLD AUTO: 4.15 X10 (3) UL (ref 1.5–7.7)
NEUTROPHILS # BLD AUTO: 4.15 X10(3) UL (ref 1.5–7.7)
NEUTROPHILS NFR BLD AUTO: 84.9 %
OSMOLALITY SERPL CALC.SUM OF ELEC: 300 MOSM/KG (ref 275–295)
PLATELET # BLD AUTO: 230 10(3)UL (ref 150–450)
POTASSIUM SERPL-SCNC: 3.7 MMOL/L (ref 3.5–5.1)
PROCALCITONIN SERPL-MCNC: 0.06 NG/ML
RBC # BLD AUTO: 4.41 X10(6)UL (ref 3.8–5.3)
SODIUM SERPL-SCNC: 142 MMOL/L (ref 136–145)
WBC # BLD AUTO: 4.9 X10(3) UL (ref 4–11)

## 2019-12-27 PROCEDURE — 99232 SBSQ HOSP IP/OBS MODERATE 35: CPT | Performed by: HOSPITALIST

## 2019-12-27 RX ORDER — ACETAMINOPHEN 325 MG/1
650 TABLET ORAL EVERY 6 HOURS PRN
Status: DISCONTINUED | OUTPATIENT
Start: 2019-12-27 | End: 2019-12-31

## 2019-12-27 RX ORDER — HYDROCHLOROTHIAZIDE 25 MG/1
25 TABLET ORAL DAILY
Status: DISCONTINUED | OUTPATIENT
Start: 2019-12-27 | End: 2019-12-31

## 2019-12-27 RX ORDER — METHYLPREDNISOLONE SODIUM SUCCINATE 125 MG/2ML
60 INJECTION, POWDER, LYOPHILIZED, FOR SOLUTION INTRAMUSCULAR; INTRAVENOUS EVERY 8 HOURS
Status: DISCONTINUED | OUTPATIENT
Start: 2019-12-27 | End: 2019-12-27

## 2019-12-27 RX ORDER — ENOXAPARIN SODIUM 100 MG/ML
40 INJECTION SUBCUTANEOUS DAILY
Status: DISCONTINUED | OUTPATIENT
Start: 2019-12-27 | End: 2019-12-31

## 2019-12-27 RX ORDER — HYDROCODONE BITARTRATE AND ACETAMINOPHEN 10; 325 MG/1; MG/1
1 TABLET ORAL EVERY 6 HOURS PRN
Status: DISCONTINUED | OUTPATIENT
Start: 2019-12-27 | End: 2019-12-31

## 2019-12-27 RX ORDER — ALBUTEROL SULFATE 2.5 MG/3ML
2.5 SOLUTION RESPIRATORY (INHALATION) EVERY 2 HOUR PRN
Status: DISCONTINUED | OUTPATIENT
Start: 2019-12-27 | End: 2019-12-31

## 2019-12-27 RX ORDER — SODIUM CHLORIDE 9 MG/ML
INJECTION, SOLUTION INTRAVENOUS CONTINUOUS
Status: DISCONTINUED | OUTPATIENT
Start: 2019-12-27 | End: 2019-12-28

## 2019-12-27 RX ORDER — PANTOPRAZOLE SODIUM 40 MG/1
40 TABLET, DELAYED RELEASE ORAL
Status: DISCONTINUED | OUTPATIENT
Start: 2019-12-27 | End: 2019-12-31

## 2019-12-27 RX ORDER — LISINOPRIL 40 MG/1
40 TABLET ORAL DAILY
Status: DISCONTINUED | OUTPATIENT
Start: 2019-12-27 | End: 2019-12-31

## 2019-12-27 RX ORDER — DICYCLOMINE HCL 20 MG
20 TABLET ORAL
Status: DISCONTINUED | OUTPATIENT
Start: 2019-12-27 | End: 2019-12-31

## 2019-12-27 RX ORDER — ASPIRIN 325 MG
325 TABLET, DELAYED RELEASE (ENTERIC COATED) ORAL DAILY
Status: DISCONTINUED | OUTPATIENT
Start: 2019-12-27 | End: 2019-12-31

## 2019-12-27 RX ORDER — METOPROLOL SUCCINATE 100 MG/1
100 TABLET, EXTENDED RELEASE ORAL DAILY
Status: DISCONTINUED | OUTPATIENT
Start: 2019-12-27 | End: 2019-12-31

## 2019-12-27 RX ORDER — QUETIAPINE 25 MG/1
25 TABLET, FILM COATED ORAL NIGHTLY
Status: DISCONTINUED | OUTPATIENT
Start: 2019-12-27 | End: 2019-12-27

## 2019-12-27 RX ORDER — AZITHROMYCIN 250 MG/1
500 TABLET, FILM COATED ORAL
Status: COMPLETED | OUTPATIENT
Start: 2019-12-27 | End: 2019-12-29

## 2019-12-27 RX ORDER — METHYLPREDNISOLONE SODIUM SUCCINATE 40 MG/ML
40 INJECTION, POWDER, LYOPHILIZED, FOR SOLUTION INTRAMUSCULAR; INTRAVENOUS EVERY 8 HOURS
Status: DISCONTINUED | OUTPATIENT
Start: 2019-12-27 | End: 2019-12-30

## 2019-12-27 RX ORDER — AMLODIPINE BESYLATE 5 MG/1
10 TABLET ORAL DAILY
Status: DISCONTINUED | OUTPATIENT
Start: 2019-12-27 | End: 2019-12-31

## 2019-12-27 RX ORDER — POTASSIUM CHLORIDE 20 MEQ/1
40 TABLET, EXTENDED RELEASE ORAL ONCE
Status: COMPLETED | OUTPATIENT
Start: 2019-12-27 | End: 2019-12-27

## 2019-12-27 RX ORDER — IPRATROPIUM BROMIDE AND ALBUTEROL SULFATE 2.5; .5 MG/3ML; MG/3ML
3 SOLUTION RESPIRATORY (INHALATION)
Status: DISCONTINUED | OUTPATIENT
Start: 2019-12-27 | End: 2019-12-29

## 2019-12-27 NOTE — H&P
[facility] HOSPITAL[facility]  History and Physical     Gavin Khan Patient Status:  Emergency    1955 MRN [id number]   Location [address] [facility] Attending WillShivani MD   Hosp Day # 0 PCP PHYSICIAN NONSTAFF     Chief Complain HIVES, SWELLING  Onion                   HIVES, SWELLING  Motrin [Ibuprofen]        Penicillins               Gabapentin              ITCHING    Medications:  No current facility-administered medications on file prior to encounter.    Tiotropium Bromide Mon HCl 10 MG Oral Tab, Take 10 mg by mouth 3 (three) times daily as needed. , Disp: , Rfl:   Pantoprazole Sodium 40 MG Oral Tab EC, Take 40 mg by mouth every morning before breakfast., Disp: , Rfl:   aspirin 325 MG Oral Tab EC, Take 325 mg by mouth daily. , D PCT  2. Hypertension  1. Continue home meds  2. Monitor BP while on steroids  3. Chronic back pain   1.  Continue norco as taken at home     Quality:  · DVT Prophylaxis: :Lovenox  · CODE status: full  · Samson: no    Plan of care discussed with pt    Lukas Andrews

## 2019-12-27 NOTE — PROGRESS NOTES
NURSING ADMISSION NOTE      Patient admitted via Cart  Oriented to room 511  Safety precautions initiated. Bed in low position. Call light in reach. Admission navigator completed. Alert x4. Room air maintaining sats WNL.  Richar expiratory wheezing

## 2019-12-27 NOTE — CM/SW NOTE
SW met with the patient at bedside to discuss DC needs. Patient lives with her daughter and her children, she is independent with ADL's and driving, she is using a cane and a walker as needed.  Patient is current with [address]  [phone]

## 2019-12-27 NOTE — PLAN OF CARE
Pt AOx4. Maintaining O2 sats on RA. Richar expiratory wheezing. Patient receiving nebs and IV steroids. IVF and abx infusing. Patient c/o sore throat and chronic back pain and receives norco q6hr. Patient up w/ a walker x1. Patient updated on POC. WCTMF.

## 2019-12-27 NOTE — PROGRESS NOTES
[facility]  Progress Note     Sally Renteria Patient Status:  Inpatient    1955 MRN [id number]   [facility] 5NW-A Attending Gabi Goncalves MD   Hosp Day # 0 PCP PHYSICIAN NONSTAFF     Chief Complaint: sob    S: Patient sligh 40 mg Oral QAM AC   • enoxaparin  40 mg Subcutaneous Daily   • MethylPREDNISolone Sodium Succ  60 mg Intravenous Q8H   • ipratropium-albuterol  3 mL Nebulization 4 times per day   • azithromycin  500 mg Oral Daily       ASSESSMENT / PLAN:     1.  COPD with

## 2019-12-27 NOTE — ED INITIAL ASSESSMENT (HPI)
Pt here with cough, sore throat, chills, BARBARA, using inhaler at home, also c/o nausea and decreased appetite.

## 2019-12-27 NOTE — PROGRESS NOTES
Multidisciplinary Discharge Rounds held 12/27/2019. Treatment team members present today include , , Charge Nurse, Nurse, RT, PT and Pharmacy caring for [employer].      Other care providers present:    Mobility Goal: up i

## 2019-12-27 NOTE — DIETARY NOTE
[place]     Admitting diagnosis:  COPD exacerbation ([facility] [J44.1]  Community acquired pneumonia of left lower lobe of lung [J18.9]    Ht: 160 cm (5' 3\")  Wt: 64.4 kg (142 lb).  This is 123% of IBW  Body mas

## 2019-12-27 NOTE — ED PROVIDER NOTES
Patient Seen in: [facility] Emergency Department      History   Patient presents with:  Dyspnea LOURDES SOB    Stated Complaint: lourdes    HPI    The patient is a 53-year-old female with a history of asthma, who is still smoking about 3 cigarettes/day, pre noted in HPI. Constitutional and vital signs reviewed. All other systems reviewed and negative except as noted above.     Physical Exam     ED Triage Vitals [12/26/19 2000]   BP (!) 162/112   Pulse 113   Resp 24   Temp 99.2 °F (37.3 °C)   Temp src Ora abnormalities.   Psych: Normal interaction, cooperative with exam       ED Course     Labs Reviewed   COMP METABOLIC PANEL (14) - Abnormal; Notable for the following components:       Result Value    Calcium, Total 8.3 (*)     A/G Ratio 0.9 (*)     All othe =====    CONCLUSION:  Slight atelectasis versus infiltrate in the left lung base.                    Dictated by: Loren Hutchins MD on 12/26/2019 at 21:38         Approved by: Loren Hutchins MD on 12/26/2019 at 21:39                     Despite nebulize

## 2019-12-28 ENCOUNTER — APPOINTMENT (OUTPATIENT)
Dept: GENERAL RADIOLOGY | Facility: HOSPITAL | Age: 64
DRG: 191 | End: 2019-12-28
Attending: HOSPITALIST
Payer: MEDICARE

## 2019-12-28 LAB — POTASSIUM SERPL-SCNC: 3.6 MMOL/L (ref 3.5–5.1)

## 2019-12-28 PROCEDURE — 71046 X-RAY EXAM CHEST 2 VIEWS: CPT | Performed by: HOSPITALIST

## 2019-12-28 PROCEDURE — 99232 SBSQ HOSP IP/OBS MODERATE 35: CPT | Performed by: HOSPITALIST

## 2019-12-28 RX ORDER — POTASSIUM CHLORIDE 20 MEQ/1
40 TABLET, EXTENDED RELEASE ORAL EVERY 4 HOURS
Status: COMPLETED | OUTPATIENT
Start: 2019-12-28 | End: 2019-12-28

## 2019-12-28 NOTE — PLAN OF CARE
Pt a&ox4. Current smoker, new nicotine patch placed on R arm. VSS. Complaining of throat pain, lozenge given. , on ra maintaining O2 sats. Wheezy/coarse breath sounds. Nebs. Solumedrol. Some tremors from steroids and nebs.  IS given to patient and encou

## 2019-12-28 NOTE — PLAN OF CARE
Received patient a/ox4. O2 sats 98% on RA. No new complaints. She was updated on plan of care and verbalizes understanding.       Problem: Patient/Family Goals  Goal: Patient/Family Long Term Goal  Description  Patient's Long Term Goal: DC home    [address]

## 2019-12-28 NOTE — PROGRESS NOTES
[facility]  Progress Note     Rebekah Cao Patient Status:  Inpatient    1955 MRN [id number]   [facility] 5NW-A Attending Alexx Harman MD   Hosp Day # 1 PCP PHYSICIAN NONSTAFF     Chief Complaint: sob    S: sob not any b • hydrochlorothiazide  25 mg Oral Daily   • lisinopril  40 mg Oral Daily   • Metoprolol Succinate ER  100 mg Oral Daily   • Pantoprazole Sodium  40 mg Oral QAM AC   • enoxaparin  40 mg Subcutaneous Daily   • ipratropium-albuterol  3 mL Nebulization 4 ирина

## 2019-12-29 LAB — POTASSIUM SERPL-SCNC: 3.7 MMOL/L (ref 3.5–5.1)

## 2019-12-29 PROCEDURE — 99232 SBSQ HOSP IP/OBS MODERATE 35: CPT | Performed by: HOSPITALIST

## 2019-12-29 RX ORDER — IPRATROPIUM BROMIDE AND ALBUTEROL SULFATE 2.5; .5 MG/3ML; MG/3ML
3 SOLUTION RESPIRATORY (INHALATION)
Status: DISCONTINUED | OUTPATIENT
Start: 2019-12-29 | End: 2019-12-31

## 2019-12-29 RX ORDER — POTASSIUM CHLORIDE 20 MEQ/1
40 TABLET, EXTENDED RELEASE ORAL ONCE
Status: COMPLETED | OUTPATIENT
Start: 2019-12-29 | End: 2019-12-29

## 2019-12-29 NOTE — PROGRESS NOTES
12/29/19 1703   Clinical Encounter Type   Visited With Patient  ( responded to Advanced Directive consult by leaving the POA for San[address] Jerez [address] short form and explanatory documents with patient for she and her daughter to peruse and complet

## 2019-12-29 NOTE — PLAN OF CARE
Received patient a/ox4. O2 sats on RA 94% overnight. No new complaints. She was updated on plan of care and verbalizes understanding.       Problem: Patient/Family Goals  Goal: Patient/Family Long Term Goal  Description  Patient's Long Term Goal: DC home

## 2019-12-29 NOTE — PLAN OF CARE
Pt a&ox4. Current smoker, new nicotine patch placed on L arm. VSS. Complaining of throat pain, lozenge given. , on ra maintaining O2 sats. Wheezy/diminished breath sounds. Nebs. Solumedrol. Some tremors from steroids and nebs.  IS given to patient and e

## 2019-12-29 NOTE — PROGRESS NOTES
[facility]  Progress Note     Stephens Hodgkins Patient Status:  Inpatient    1955 MRN [id number]   [facility] 5NW-A Attending Frankie Patrick MD   Hosp Day # 2 PCP PHYSICIAN NONSTAFF     Chief Complaint: sob    S: sob still not Fluticasone Furoate-Vilanterol  1 puff Inhalation Daily   • hydrochlorothiazide  25 mg Oral Daily   • lisinopril  40 mg Oral Daily   • Metoprolol Succinate ER  100 mg Oral Daily   • Pantoprazole Sodium  40 mg Oral QAM AC   • enoxaparin  40 mg Subcutaneous

## 2019-12-30 LAB
ANION GAP SERPL CALC-SCNC: 5 MMOL/L (ref 0–18)
BASOPHILS # BLD AUTO: 0.03 X10(3) UL (ref 0–0.2)
BASOPHILS NFR BLD AUTO: 0.2 %
BUN BLD-MCNC: 26 MG/DL (ref 7–18)
BUN/CREAT SERPL: 30.2 (ref 10–20)
CALCIUM BLD-MCNC: 8.5 MG/DL (ref 8.5–10.1)
CHLORIDE SERPL-SCNC: 104 MMOL/L (ref 98–112)
CO2 SERPL-SCNC: 29 MMOL/L (ref 21–32)
CREAT BLD-MCNC: 0.86 MG/DL (ref 0.55–1.02)
DEPRECATED RDW RBC AUTO: 43.1 FL (ref 35.1–46.3)
EOSINOPHIL # BLD AUTO: 0 X10(3) UL (ref 0–0.7)
EOSINOPHIL NFR BLD AUTO: 0 %
ERYTHROCYTE [DISTWIDTH] IN BLOOD BY AUTOMATED COUNT: 13 % (ref 11–15)
GLUCOSE BLD-MCNC: 140 MG/DL (ref 70–99)
HCT VFR BLD AUTO: 43.1 % (ref 35–48)
HGB BLD-MCNC: 14.6 G/DL (ref 12–16)
IMM GRANULOCYTES # BLD AUTO: 0.28 X10(3) UL (ref 0–1)
IMM GRANULOCYTES NFR BLD: 1.8 %
LYMPHOCYTES # BLD AUTO: 1.08 X10(3) UL (ref 1–4)
LYMPHOCYTES NFR BLD AUTO: 7 %
MCH RBC QN AUTO: 30.4 PG (ref 26–34)
MCHC RBC AUTO-ENTMCNC: 33.9 G/DL (ref 31–37)
MCV RBC AUTO: 89.8 FL (ref 80–100)
MONOCYTES # BLD AUTO: 0.71 X10(3) UL (ref 0.1–1)
MONOCYTES NFR BLD AUTO: 4.6 %
NEUTROPHILS # BLD AUTO: 13.32 X10 (3) UL (ref 1.5–7.7)
NEUTROPHILS # BLD AUTO: 13.32 X10(3) UL (ref 1.5–7.7)
NEUTROPHILS NFR BLD AUTO: 86.4 %
OSMOLALITY SERPL CALC.SUM OF ELEC: 293 MOSM/KG (ref 275–295)
PLATELET # BLD AUTO: 300 10(3)UL (ref 150–450)
POTASSIUM SERPL-SCNC: 3.6 MMOL/L (ref 3.5–5.1)
POTASSIUM SERPL-SCNC: 4.2 MMOL/L (ref 3.5–5.1)
RBC # BLD AUTO: 4.8 X10(6)UL (ref 3.8–5.3)
SODIUM SERPL-SCNC: 138 MMOL/L (ref 136–145)
WBC # BLD AUTO: 15.4 X10(3) UL (ref 4–11)

## 2019-12-30 PROCEDURE — 99232 SBSQ HOSP IP/OBS MODERATE 35: CPT | Performed by: HOSPITALIST

## 2019-12-30 RX ORDER — METHYLPREDNISOLONE SODIUM SUCCINATE 40 MG/ML
40 INJECTION, POWDER, LYOPHILIZED, FOR SOLUTION INTRAMUSCULAR; INTRAVENOUS EVERY 12 HOURS
Status: DISCONTINUED | OUTPATIENT
Start: 2019-12-30 | End: 2019-12-31

## 2019-12-30 RX ORDER — POTASSIUM CHLORIDE 20 MEQ/1
40 TABLET, EXTENDED RELEASE ORAL EVERY 4 HOURS
Status: COMPLETED | OUTPATIENT
Start: 2019-12-30 | End: 2019-12-30

## 2019-12-30 NOTE — PLAN OF CARE
Received patient alert and orientated. Oxygen WNL on room air. Pt with complaints of pain, PRN pain medication given. Tolerating diet. Voiding. Up with assistance. Pt instructed to call for help, call light within reach. Pt updated on POC.  WC   Problem:

## 2019-12-30 NOTE — PROGRESS NOTES
Multidisciplinary Discharge Rounds held 12/30/2019. Treatment team members present today include , , Charge Nurse, Nurse, RT, PT and Pharmacy caring for [employer].      Other care providers present:    Mobility Goal: inde

## 2019-12-30 NOTE — PROGRESS NOTES
[facility]  Progress Note      Patient Status:  Inpatient    1955 MRN [id number]   [facility] 5NW-A Attending Manfred Marquez MD   Hosp Day # 3 PCP PHYSICIAN NONSTAFF     Chief Complaint: sob    S: sob slightly Imaging data reviewed in Epic.     Medications:   • Potassium Chloride ER  40 mEq Oral Q4H   • MethylPREDNISolone Sodium Succ  40 mg Intravenous Q12H   • ipratropium-albuterol  3 mL Nebulization 6 times per day   • amLODIPine Besylate  10 mg Oral Daily   •

## 2019-12-30 NOTE — PLAN OF CARE
Problem: COPD exacerbation  Data: Pt is AOx4. On room air. PRN throat lozenges given as ordered. No tele ordered. VSS. Voids. Up with walker and assistance. PT eval ordered, pending. IV steroids. Pt updated with plan of care. Call light within reach.  Will

## 2019-12-31 VITALS
DIASTOLIC BLOOD PRESSURE: 75 MMHG | BODY MASS INDEX: 23.95 KG/M2 | SYSTOLIC BLOOD PRESSURE: 117 MMHG | HEART RATE: 79 BPM | RESPIRATION RATE: 18 BRPM | TEMPERATURE: 98 F | WEIGHT: 135.19 LBS | HEIGHT: 63 IN | OXYGEN SATURATION: 96 %

## 2019-12-31 PROCEDURE — 99239 HOSP IP/OBS DSCHRG MGMT >30: CPT | Performed by: HOSPITALIST

## 2019-12-31 RX ORDER — BENZONATATE 100 MG/1
100 CAPSULE ORAL 3 TIMES DAILY PRN
Qty: 30 CAPSULE | Refills: 0 | Status: SHIPPED | OUTPATIENT
Start: 2019-12-31 | End: 2019-12-31

## 2019-12-31 RX ORDER — BENZONATATE 100 MG/1
100 CAPSULE ORAL 3 TIMES DAILY PRN
Qty: 30 CAPSULE | Refills: 0 | Status: SHIPPED | OUTPATIENT
Start: 2019-12-31 | End: 2020-07-21 | Stop reason: ALTCHOICE

## 2019-12-31 RX ORDER — PREDNISONE 20 MG/1
20 TABLET ORAL DAILY
Qty: 7 TABLET | Refills: 0 | Status: SHIPPED | OUTPATIENT
Start: 2019-12-31 | End: 2019-12-31

## 2019-12-31 RX ORDER — PREDNISONE 20 MG/1
TABLET ORAL
Qty: 7 TABLET | Refills: 0 | Status: ON HOLD | OUTPATIENT
Start: 2019-12-31 | End: 2021-02-17

## 2019-12-31 RX ORDER — PREDNISONE 20 MG/1
TABLET ORAL
Qty: 7 TABLET | Refills: 0 | Status: SHIPPED | OUTPATIENT
Start: 2019-12-31 | End: 2019-12-31

## 2019-12-31 NOTE — PLAN OF CARE
Problem: Patient/Family Goals  Goal: Patient/Family Long Term Goal  Description  Patient's Long Term Goal: DC home    Interventions:  - nebs, steroids, abx  - See additional Care Plan goals for specific interventions   Outcome: Adequate for Discharge  Go Manager, , Charge Nurse, Nurse, RT, PT and Pharmacy caring for [employer].      Other care providers present:    Mobility Goal:    Readmission Risk:     [] Low     [] Medium     [x] High    Active issue(s) requiring resolution prior to d

## 2019-12-31 NOTE — PLAN OF CARE
Problem: Patient/Family Goals  Goal: Patient/Family Long Term Goal  Description  Patient's Long Term Goal: DC home    Interventions:  - nebs, steroids, abx  - See additional Care Plan goals for specific interventions   Outcome: Progressing  Goal: Patient

## 2019-12-31 NOTE — CM/SW NOTE
12/31/19 1409   Discharge disposition   Expected discharge disposition Home-Health   Name of Facillity/Home Care/Hospice Residential   Discharge transportation Private car     Patient for d/c home today, page to [facility]idential [facility] liaison to make aware.

## 2019-12-31 NOTE — PROGRESS NOTES
NURSING DISCHARGE NOTE    Discharged Home via Wheelchair. Accompanied by Family member  Belongings Taken by patient/family. Patient alert, vitals stable. Wheelchair to take patient to lobby where daughter is picking up patient.

## 2019-12-31 NOTE — PHYSICAL THERAPY NOTE
PHYSICAL THERAPY QUICK EVALUATION - INPATIENT    Room Number: 965/520-F  Evaluation Date: 12/31/2019  Presenting Problem: COPD exacerbation and CA-PNA  Physician Order: PT Eval and Treat    Problem List  Principal Problem:    COP[facility] exacerbation ([facility]  Act PAIN ASSESSMENT  Rating: Unable to rate  Location: LB  Management Techniques: Activity promotion; Body mechanics;Repositioning   RANGE OF MOTION AND STRENGTH ASSESSMENT  Upper extremity ROM and strength are within functional limits; except RUE shld flex using RW and supervision c intermittent shuffle. Pt then returned to her room and sat in [facility] c mod indep. BLE were elev and RN made aware. Patient End of Session: Up in chair;Needs met;Call light within reach;RN aware of session/findings; All patient qu

## 2020-01-01 NOTE — DISCHARGE SUMMARY
[facility] HOSPITALIST  DISCHARGE SUMMARY     Kristyn Morales Patient Status:  Inpatient    1955 MRN [id number]   [facility] 5NW-A Attending No att. providers found   Hosp Day # 4 PCP PHYSICIAN NONSTAFF     Date of Admission: 2019 results pending at Discharge:   · none    Consultants:  • pulm    Discharge Medication List:     Discharge Medications      CHANGE how you take these medications      Instructions Prescription details   predniSONE 20 MG Tabs  Commonly known as:  Delmy Cunningham known as:  PROTONIX      Take 40 mg by mouth every morning before breakfast.   Refills:  0     QUEtiapine Fumarate 25 MG Tabs  Commonly known as:  SEROQUEL      Take 25 mg by mouth nightly.    Refills:  0     Thiamine HCl 100 MG Tabs  Commonly known as:  Vi 30 minutes

## 2020-07-14 ENCOUNTER — APPOINTMENT (OUTPATIENT)
Dept: GENERAL RADIOLOGY | Facility: HOSPITAL | Age: 65
End: 2020-07-14
Attending: EMERGENCY MEDICINE
Payer: MEDICARE

## 2020-07-14 ENCOUNTER — HOSPITAL ENCOUNTER (EMERGENCY)
Facility: HOSPITAL | Age: 65
Discharge: HOME | End: 2020-07-14
Attending: EMERGENCY MEDICINE
Payer: MEDICARE

## 2020-07-14 VITALS
DIASTOLIC BLOOD PRESSURE: 91 MMHG | SYSTOLIC BLOOD PRESSURE: 118 MMHG | RESPIRATION RATE: 20 BRPM | BODY MASS INDEX: 22.2 KG/M2 | OXYGEN SATURATION: 92 % | HEART RATE: 64 BPM | WEIGHT: 130 LBS | HEIGHT: 64 IN

## 2020-07-14 DIAGNOSIS — M54.12 CERVICAL RADICULOPATHY: Primary | ICD-10-CM

## 2020-07-14 DIAGNOSIS — M19.012 OSTEOARTHRITIS OF LEFT SHOULDER, UNSPECIFIED OSTEOARTHRITIS TYPE: ICD-10-CM

## 2020-07-14 PROCEDURE — 99283 EMERGENCY DEPT VISIT LOW MDM: CPT

## 2020-07-14 PROCEDURE — 73030 X-RAY EXAM OF SHOULDER: CPT | Performed by: EMERGENCY MEDICINE

## 2020-07-14 PROCEDURE — 96372 THER/PROPH/DIAG INJ SC/IM: CPT

## 2020-07-14 RX ORDER — HYDROMORPHONE HYDROCHLORIDE 1 MG/ML
1 INJECTION, SOLUTION INTRAMUSCULAR; INTRAVENOUS; SUBCUTANEOUS ONCE
Status: COMPLETED | OUTPATIENT
Start: 2020-07-14 | End: 2020-07-14

## 2020-07-14 RX ORDER — HYDROCODONE BITARTRATE AND ACETAMINOPHEN 5; 325 MG/1; MG/1
1-2 TABLET ORAL EVERY 6 HOURS PRN
Qty: 20 TABLET | Refills: 0 | Status: SHIPPED | OUTPATIENT
Start: 2020-07-14 | End: 2020-07-21 | Stop reason: DRUGHIGH

## 2020-07-14 NOTE — ED PROVIDER NOTES
Patient Seen in: [facility] Emergency Department      History   Patient presents with:  Upper Extremity Injury    Stated Complaint: left shoulder pain and tingling to arm and hand x2 weeks.  no injury     HPI    26-year-old female comes to the hospit Types: 28 Cans of beer per week      Frequency: 4 or more times a week      Drinks per session: 3 or 4    Drug use: Not Currently      Types: \"Crack\" cocaine      Comment: \"years ago\"             Review of Systems    Positive for stated complaint: left osteophyte is noted. CONCLUSION:  Moderate to marked loss left glenohumeral joint space is noted. A small subacromial osteophyte is noted. No fracture.    Dictated by: Amanda Armenta MD on 7/14/2020 at 12:33 PM     Finalized by: Amanda Armenta MD

## 2020-07-14 NOTE — ED INITIAL ASSESSMENT (HPI)
PT PRESENTED TO ED C/O LEFT ARM PAIN. PT STATES SHE'S BEEN TO [facility] [address] [facility] AND [facility] WITH NO RELIEF FROM PAIN RADIATING UP LEFT ARM TO NECK AND DOWN BACK.

## 2020-07-15 NOTE — ED NOTES
Pt called requesting to  prescription at [facility] in [place] not the [facility] location where she believes it was sent. During phone call to [facility] no rx is found  From Dr. Arthur Olivia for the Lindsay Rias but patient did just  60 norco on 7/11/20.

## 2020-07-16 ENCOUNTER — TELEPHONE (OUTPATIENT)
Dept: SURGERY | Facility: CLINIC | Age: 65
End: 2020-07-16

## 2020-07-16 ENCOUNTER — HOSPITAL ENCOUNTER (EMERGENCY)
Facility: HOSPITAL | Age: 65
Discharge: HOME | End: 2020-07-16
Attending: STUDENT IN AN ORGANIZED HEALTH CARE EDUCATION/TRAINING PROGRAM
Payer: MEDICARE

## 2020-07-16 ENCOUNTER — TELEPHONE (OUTPATIENT)
Dept: NEUROLOGY | Facility: CLINIC | Age: 65
End: 2020-07-16

## 2020-07-16 VITALS
DIASTOLIC BLOOD PRESSURE: 117 MMHG | TEMPERATURE: 98 F | RESPIRATION RATE: 16 BRPM | BODY MASS INDEX: 22 KG/M2 | OXYGEN SATURATION: 97 % | HEART RATE: 88 BPM | SYSTOLIC BLOOD PRESSURE: 171 MMHG | WEIGHT: 130.06 LBS

## 2020-07-16 DIAGNOSIS — M54.2 NECK PAIN: Primary | ICD-10-CM

## 2020-07-16 DIAGNOSIS — M62.838 TRAPEZIUS MUSCLE SPASM: ICD-10-CM

## 2020-07-16 DIAGNOSIS — M54.12 CERVICAL RADICULOPATHY: ICD-10-CM

## 2020-07-16 DIAGNOSIS — M79.602 LEFT ARM PAIN: ICD-10-CM

## 2020-07-16 PROCEDURE — 99283 EMERGENCY DEPT VISIT LOW MDM: CPT

## 2020-07-16 PROCEDURE — 96372 THER/PROPH/DIAG INJ SC/IM: CPT

## 2020-07-16 RX ORDER — ONDANSETRON 4 MG/1
4 TABLET, ORALLY DISINTEGRATING ORAL ONCE
Status: DISCONTINUED | OUTPATIENT
Start: 2020-07-16 | End: 2020-07-16

## 2020-07-16 RX ORDER — DIAZEPAM 10 MG/1
10 TABLET ORAL ONCE
Status: COMPLETED | OUTPATIENT
Start: 2020-07-16 | End: 2020-07-16

## 2020-07-16 RX ORDER — HYDROMORPHONE HYDROCHLORIDE 1 MG/ML
1 INJECTION, SOLUTION INTRAMUSCULAR; INTRAVENOUS; SUBCUTANEOUS ONCE
Status: DISCONTINUED | OUTPATIENT
Start: 2020-07-16 | End: 2020-07-16

## 2020-07-16 RX ORDER — DIAZEPAM 10 MG/1
10 TABLET ORAL 3 TIMES DAILY PRN
Qty: 20 TABLET | Refills: 0 | Status: SHIPPED | OUTPATIENT
Start: 2020-07-16 | End: 2020-07-23

## 2020-07-16 RX ORDER — KETOROLAC TROMETHAMINE 30 MG/ML
30 INJECTION, SOLUTION INTRAMUSCULAR; INTRAVENOUS ONCE
Status: COMPLETED | OUTPATIENT
Start: 2020-07-16 | End: 2020-07-16

## 2020-07-16 RX ORDER — DICLOFENAC POTASSIUM 50 MG/1
50 TABLET, FILM COATED ORAL 3 TIMES DAILY
Qty: 30 TABLET | Refills: 0 | Status: SHIPPED | OUTPATIENT
Start: 2020-07-16 | End: 2020-10-08

## 2020-07-16 RX ORDER — LIDOCAINE 50 MG/G
2 PATCH TOPICAL EVERY 24 HOURS
Qty: 30 PATCH | Refills: 0 | Status: ON HOLD | OUTPATIENT
Start: 2020-07-16 | End: 2021-05-27

## 2020-07-16 RX ORDER — KETOROLAC TROMETHAMINE 30 MG/ML
30 INJECTION, SOLUTION INTRAMUSCULAR; INTRAVENOUS ONCE
Status: DISCONTINUED | OUTPATIENT
Start: 2020-07-16 | End: 2020-07-16

## 2020-07-16 NOTE — TELEPHONE ENCOUNTER
[facility] ED referring pt to neurosurg as Np for cervical radiculopathy and neck pain, ED asks if we can have pt f/u anytime this week. Pls advise.

## 2020-07-16 NOTE — TELEPHONE ENCOUNTER
Informed pt's daughter we have no open appts prior to 7/21. Verbalized understanding, no further questions.

## 2020-07-16 NOTE — ED NOTES
Pt arrived in ED crying hysterically with c/o left arm/shoulder and neck pain x 2 weeks. States no known injury. Recently seen in this ED. Daughter at bedside.

## 2020-07-16 NOTE — TELEPHONE ENCOUNTER
I had made an appointment for next week 07/21/2020 with Dr. Cayetano Borden. Patient daughter wants sooner appointment.

## 2020-07-16 NOTE — CM/SW NOTE
Patient has been to multiple EDs for her pain. ED MD requesting patient be seen at [facility] for \"next day appt. \" Will call backline when [facility] opens in the morning to see if the patient can be seen this week.   Also gave patient and her daughter contact informat

## 2020-07-16 NOTE — ED PROVIDER NOTES
Patient Seen in: [facility] Emergency Department      History   Patient presents with:  Pain    Stated Complaint: LEFT ARM AND BACK PAIN    HPI    Patient is a 53-year-old female who had been seen in the emergency department for neck and shoulder pa LEFT ARM AND BACK PAIN  Other systems are as noted in HPI. Constitutional and vital signs reviewed. All other systems reviewed and negative except as noted above.     Physical Exam     ED Triage Vitals [07/16/20 0120]   BP (!) 171/117   Pulse 88   Res data to display         MDM     Based on patient's history and physical examination etiology of her back pain appears to be musculoskeletal in nature. I do not suspect epidural abscess, septic arthritis or other infectious process.     Patient treated with

## 2020-07-21 ENCOUNTER — OFFICE VISIT (OUTPATIENT)
Dept: NEUROLOGY | Facility: CLINIC | Age: 65
End: 2020-07-21
Payer: MEDICARE

## 2020-07-21 ENCOUNTER — HOSPITAL ENCOUNTER (EMERGENCY)
Facility: HOSPITAL | Age: 65
Discharge: HOME | End: 2020-07-21
Attending: EMERGENCY MEDICINE
Payer: MEDICARE

## 2020-07-21 VITALS
DIASTOLIC BLOOD PRESSURE: 100 MMHG | HEART RATE: 99 BPM | SYSTOLIC BLOOD PRESSURE: 154 MMHG | WEIGHT: 130 LBS | HEIGHT: 63 IN | TEMPERATURE: 98 F | BODY MASS INDEX: 23.04 KG/M2 | OXYGEN SATURATION: 96 % | RESPIRATION RATE: 20 BRPM

## 2020-07-21 VITALS
HEART RATE: 74 BPM | RESPIRATION RATE: 16 BRPM | WEIGHT: 130 LBS | DIASTOLIC BLOOD PRESSURE: 88 MMHG | HEIGHT: 63 IN | SYSTOLIC BLOOD PRESSURE: 148 MMHG | TEMPERATURE: 98 F | BODY MASS INDEX: 23.04 KG/M2

## 2020-07-21 DIAGNOSIS — M54.12 CERVICAL RADICULOPATHY: Primary | ICD-10-CM

## 2020-07-21 DIAGNOSIS — G89.29 OTHER CHRONIC PAIN: Primary | ICD-10-CM

## 2020-07-21 DIAGNOSIS — Z79.891 CHRONIC PRESCRIPTION OPIATE USE: ICD-10-CM

## 2020-07-21 LAB
ALBUMIN SERPL-MCNC: 3.4 G/DL (ref 3.4–5)
ALBUMIN/GLOB SERPL: 1 {RATIO} (ref 1–2)
ALP LIVER SERPL-CCNC: 57 U/L (ref 50–130)
ALT SERPL-CCNC: 23 U/L (ref 13–56)
ANION GAP SERPL CALC-SCNC: 3 MMOL/L (ref 0–18)
AST SERPL-CCNC: 27 U/L (ref 15–37)
ATRIAL RATE: 87 BPM
BASOPHILS # BLD AUTO: 0.02 X10(3) UL (ref 0–0.2)
BASOPHILS NFR BLD AUTO: 0.4 %
BILIRUB SERPL-MCNC: 0.4 MG/DL (ref 0.1–2)
BUN BLD-MCNC: 15 MG/DL (ref 7–18)
BUN/CREAT SERPL: 16.9 (ref 10–20)
CALCIUM BLD-MCNC: 8.9 MG/DL (ref 8.5–10.1)
CHLORIDE SERPL-SCNC: 105 MMOL/L (ref 98–112)
CO2 SERPL-SCNC: 33 MMOL/L (ref 21–32)
CREAT BLD-MCNC: 0.89 MG/DL (ref 0.55–1.02)
DEPRECATED RDW RBC AUTO: 47 FL (ref 35.1–46.3)
EOSINOPHIL # BLD AUTO: 0.08 X10(3) UL (ref 0–0.7)
EOSINOPHIL NFR BLD AUTO: 1.4 %
ERYTHROCYTE [DISTWIDTH] IN BLOOD BY AUTOMATED COUNT: 14.5 % (ref 11–15)
GLOBULIN PLAS-MCNC: 3.4 G/DL (ref 2.8–4.4)
GLUCOSE BLD-MCNC: 96 MG/DL (ref 70–99)
HCT VFR BLD AUTO: 44.3 % (ref 35–48)
HGB BLD-MCNC: 14.5 G/DL (ref 12–16)
IMM GRANULOCYTES # BLD AUTO: 0.03 X10(3) UL (ref 0–1)
IMM GRANULOCYTES NFR BLD: 0.5 %
LYMPHOCYTES # BLD AUTO: 2.09 X10(3) UL (ref 1–4)
LYMPHOCYTES NFR BLD AUTO: 37.7 %
M PROTEIN MFR SERPL ELPH: 6.8 G/DL (ref 6.4–8.2)
MCH RBC QN AUTO: 29.2 PG (ref 26–34)
MCHC RBC AUTO-ENTMCNC: 32.7 G/DL (ref 31–37)
MCV RBC AUTO: 89.3 FL (ref 80–100)
MONOCYTES # BLD AUTO: 0.57 X10(3) UL (ref 0.1–1)
MONOCYTES NFR BLD AUTO: 10.3 %
NEUTROPHILS # BLD AUTO: 2.76 X10 (3) UL (ref 1.5–7.7)
NEUTROPHILS # BLD AUTO: 2.76 X10(3) UL (ref 1.5–7.7)
NEUTROPHILS NFR BLD AUTO: 49.7 %
OSMOLALITY SERPL CALC.SUM OF ELEC: 293 MOSM/KG (ref 275–295)
P AXIS: 77 DEGREES
P-R INTERVAL: 148 MS
PLATELET # BLD AUTO: 243 10(3)UL (ref 150–450)
POTASSIUM SERPL-SCNC: 4.2 MMOL/L (ref 3.5–5.1)
Q-T INTERVAL: 366 MS
QRS DURATION: 80 MS
QTC CALCULATION (BEZET): 440 MS
R AXIS: 57 DEGREES
RBC # BLD AUTO: 4.96 X10(6)UL (ref 3.8–5.3)
SODIUM SERPL-SCNC: 141 MMOL/L (ref 136–145)
T AXIS: 69 DEGREES
TROPONIN I SERPL-MCNC: <0.045 NG/ML (ref ?–0.04)
VENTRICULAR RATE: 87 BPM
WBC # BLD AUTO: 5.6 X10(3) UL (ref 4–11)

## 2020-07-21 PROCEDURE — 96374 THER/PROPH/DIAG INJ IV PUSH: CPT

## 2020-07-21 PROCEDURE — 99285 EMERGENCY DEPT VISIT HI MDM: CPT

## 2020-07-21 PROCEDURE — 84484 ASSAY OF TROPONIN QUANT: CPT | Performed by: EMERGENCY MEDICINE

## 2020-07-21 PROCEDURE — 1111F DSCHRG MED/CURRENT MED MERGE: CPT | Performed by: OTHER

## 2020-07-21 PROCEDURE — 80053 COMPREHEN METABOLIC PANEL: CPT | Performed by: EMERGENCY MEDICINE

## 2020-07-21 PROCEDURE — 85025 COMPLETE CBC W/AUTO DIFF WBC: CPT | Performed by: EMERGENCY MEDICINE

## 2020-07-21 PROCEDURE — 93005 ELECTROCARDIOGRAM TRACING: CPT

## 2020-07-21 PROCEDURE — 93010 ELECTROCARDIOGRAM REPORT: CPT

## 2020-07-21 PROCEDURE — 99284 EMERGENCY DEPT VISIT MOD MDM: CPT

## 2020-07-21 PROCEDURE — 99204 OFFICE O/P NEW MOD 45 MIN: CPT | Performed by: OTHER

## 2020-07-21 RX ORDER — AMITRIPTYLINE HYDROCHLORIDE 10 MG/1
TABLET, FILM COATED ORAL
Qty: 60 TABLET | Refills: 2 | Status: SHIPPED | OUTPATIENT
Start: 2020-07-21 | End: 2020-10-08 | Stop reason: DRUGHIGH

## 2020-07-21 RX ORDER — KETOROLAC TROMETHAMINE 30 MG/ML
30 INJECTION, SOLUTION INTRAMUSCULAR; INTRAVENOUS ONCE
Status: COMPLETED | OUTPATIENT
Start: 2020-07-21 | End: 2020-07-21

## 2020-07-21 RX ORDER — METHYLPREDNISOLONE 4 MG/1
TABLET ORAL
Qty: 1 PACKAGE | Refills: 0 | Status: SHIPPED | OUTPATIENT
Start: 2020-07-21 | End: 2020-10-08 | Stop reason: ALTCHOICE

## 2020-07-21 NOTE — ED NOTES
Pt reports pain as 9/10, down from 10/10. Discharge instructions reviewed with pt. Pt has appointment at 2:30 today with MD.  Calling daughter for ride home.

## 2020-07-21 NOTE — PATIENT INSTRUCTIONS
Please start taking medrol dose pack (steroids) for acute treatment of inflammation for cervical radiculopathy  For nerve pain, start taking amitriptyline as follows: start at 10 mg (1 pill) nightly x1 week, then increase to 20 mg (2 tabs) nightly   Have M days to fill the prescription. • Patient must present photo ID at time of . PLEASE NOTE: PRESCRIPTIONS MUST BE PICKED UP PRIOR TO 3:00PM MONDAY-FRIDAY    Scheduling Tests:     If your physician has ordered radiology tests such as MRI or CT scans, additional fee. • A signed Release of Information (LILLIE) must be on file before forms may be submitted. When dropping off forms, please ask the  for this paper. • Failure to follow above steps may result in the delay of form completion.   •

## 2020-07-21 NOTE — ED PROVIDER NOTES
Patient Seen in: [facility] Emergency Department      History   Patient presents with:  Pain    Stated Complaint: Pain    HPI    The patient is a 60-year-old female who has had 3 weeks of pain along the left side of her neck and left upper extremity • HYSTERECTOMY     • TOTAL ABDOM HYSTERECTOMY                      Social History    Tobacco Use      Smoking status: Current Some Day Smoker        Packs/day: 0.50        Types: Cigarettes        Start date: 1/29/1970      Smokeless tobacco: Never Used Supple neck without any meningismus or rigidity  Cardiovascular:    Regular rhythm without murmurs rubs or gallops, no peripheral edema or JVD. Radial pulses 2+ bilaterally. Lungs: Speaking full sentences without any distress or retractions.  Clear to [employer] CBC W/ DIFFERENTIAL[[id number]]          Abnormal            Final result                 Please view results for these tests on the individual orders.    RAINBOW DRAW BLUE   RAINBOW DRAW LAVENDER   RAINBOW DRAW LIGHT GREEN   RAINBOW DRAW GOLD     EKG    Rat HYDROCODON-ACETAMINOPHN 02/14/2020 02/14/2020  60 tablet  30 Barak HERNÁNDEZ) [facility]            She is chronically on opiates.   At this point I believe we have adequately ruled out any emergent condition that would require any further diagnost

## 2020-07-21 NOTE — ED INITIAL ASSESSMENT (HPI)
59 yo f complaining of left neck and arm pain x 3 weeks. Pain worst with movement. Pt has an appointment with neurologist today. Reports chronic cough from smoking. Denies fever, chills or SOB.

## 2020-07-21 NOTE — H&P
[facility] New Patient / Consult Visit    Nuris Simental is a 60year old female. Referring MD: No ref.  provider found    Patient presents with:  Hospital F/U: C/O of left shoulder pain eadiating to the arm wi Unspecified essential hypertension      Past Surgical History:   Procedure Laterality Date   • TOTAL ABDOM HYSTERECTOMY       Social History    Tobacco Use      Smoking status: Current Some Day Smoker        Packs/day: 0.50        Types: Cigarettes pain.). Takes for lumbar pain and leg pain. Has been taking 5 years. 0   • Metoprolol Succinate  MG Oral Tablet 24 Hr Take 100 mg by mouth daily. 1   • Thiamine HCl 100 MG Oral Tab Take 100 mg by mouth daily.      • Fluticasone Furoate-Vilanterol comprehension  Memory: normal  Attention/concentration: normal    Fundoscopic Exam: optic discs sharp bilaterally    Cranial Nerves: II through XII  Optic:    Pupils: equally round and reactive to light with direct and consensual responses, normal accomoda fracture. From [facility]    Xray C spine (6/27/2020): FINDINGS: Lateral view/swimmer's view of the cervical spine visualize(s) through  anterior-superior T1. Vertebrae:  Vertebral body heights are preserved.        Disc Spaces/Joints: Mul doctor. She would likely benefit from physical therapy, but may need to have evaluation by neurosurgery prior to considering this.   Otherwise, for treatment, she has neuropathic pain discomfort in the left upper extremity and would benefit from a neuropat

## 2020-07-27 ENCOUNTER — HOSPITAL ENCOUNTER (OUTPATIENT)
Dept: MRI IMAGING | Facility: HOSPITAL | Age: 65
Discharge: HOME | End: 2020-07-27
Attending: Other
Payer: MEDICARE

## 2020-07-27 DIAGNOSIS — M54.12 CERVICAL RADICULOPATHY: ICD-10-CM

## 2020-07-27 PROCEDURE — 72141 MRI NECK SPINE W/O DYE: CPT | Performed by: OTHER

## 2020-07-30 ENCOUNTER — TELEPHONE (OUTPATIENT)
Dept: NEUROLOGY | Facility: CLINIC | Age: 65
End: 2020-07-30

## 2020-07-30 DIAGNOSIS — M54.12 CERVICAL RADICULOPATHY: Primary | ICD-10-CM

## 2020-07-30 NOTE — TELEPHONE ENCOUNTER
Per OV notes from 07/21/2020  Dr. Josy Solorzano states patient was advised to follow up with [facility]. Additionally, patient was advised to follow-up with neurosurgery, as previously recommended by her primary doctor.   She would likely benefit from physical thera

## 2020-08-03 ENCOUNTER — TELEPHONE (OUTPATIENT)
Dept: NEUROLOGY | Facility: CLINIC | Age: 65
End: 2020-08-03

## 2020-08-03 NOTE — TELEPHONE ENCOUNTER
Pt stated her arm and fingers are getting worse with numbness and pain. Pt also asked about MRI Results.     Call pt

## 2020-08-03 NOTE — TELEPHONE ENCOUNTER
Per Epic review:  -7/21/2020 LOV  -evaluated for pain in shoulder into arm with numbness  -MRI Cervical ordered, completed on 7/27/2020  -MDP and low dose Amitriptyline prescribed at visit. -recommended to see neurosugery.  No appts currently on file thro

## 2020-08-06 ENCOUNTER — TELEPHONE (OUTPATIENT)
Dept: NEUROLOGY | Facility: CLINIC | Age: 65
End: 2020-08-06

## 2020-08-06 NOTE — TELEPHONE ENCOUNTER
Called patient and discussed results of MRI cervical spine, - multilevel degenerative changes - recommend evaluation by neurosurgery given weakness on exam and continued discomfort

## 2020-08-10 ENCOUNTER — TELEPHONE (OUTPATIENT)
Dept: NEUROLOGY | Facility: CLINIC | Age: 65
End: 2020-08-10

## 2020-08-10 NOTE — TELEPHONE ENCOUNTER
Neck is still bothering her still. And back, and left arm. She is in tremendous pain, crying at night. She will be seeing Dr. Guerrero Davis on 8/17, but needs symptom management until plan is arranged.     Medrol dosepak given at last office visit did not hel

## 2020-08-10 NOTE — TELEPHONE ENCOUNTER
D/W Dr. Jossy Denise. Patient can try ibuprofen (OTC) 600 mg TID x 3-4 days. He can increase amitriptyline. She should contact pain specialist.     Patient notified. She verbalized understanding, did not request adjustments of her prescribed med.

## 2020-08-24 ENCOUNTER — OFFICE VISIT (OUTPATIENT)
Dept: SURGERY | Facility: CLINIC | Age: 65
End: 2020-08-24
Payer: MEDICARE

## 2020-08-24 VITALS — DIASTOLIC BLOOD PRESSURE: 86 MMHG | HEART RATE: 78 BPM | SYSTOLIC BLOOD PRESSURE: 144 MMHG

## 2020-08-24 DIAGNOSIS — M47.22 CERVICAL SPONDYLOSIS WITH RADICULOPATHY: Primary | ICD-10-CM

## 2020-08-24 PROCEDURE — 99204 OFFICE O/P NEW MOD 45 MIN: CPT | Performed by: NEUROLOGICAL SURGERY

## 2020-08-24 RX ORDER — TRAMADOL HYDROCHLORIDE 50 MG/1
50 TABLET ORAL
Qty: 60 TABLET | Refills: 0 | Status: SHIPPED | OUTPATIENT
Start: 2020-08-24 | End: 2020-10-08

## 2020-08-24 NOTE — PROGRESS NOTES
Location of Pain:  Left neck / back, / arm     Date Pain Began: 3 months        Work Related:   No        Receiving Work Comp/Disability:   No    Numeric Rating Scale:  Pain at Present:  8

## 2020-08-24 NOTE — PROGRESS NOTES
[facility]  Neurological Surgery Clinic Note    Name: Vickey Cockayne  : 1955  MRN: [id number]  PCP: PHYSICIAN NONSTAFF    CHIEF COMPLAINT:  Neck pain and arm pain.   Also chest pain    HISTORY OF PRESENT ILLNESS:  Norm Dorman is a foraminal narrowing. C6-C7:  Mild to moderate posterior disc osteophyte complex extending into the neural foramina with mild facet and uncinate hypertrophy. No spinal canal stenosis. Moderate to severe bilateral foraminal narrowing.      C7-T1:  There REVIEW OF SYSTEMS:  Review of Systems   Constitutional: Negative for chills and fever. HENT: Negative for congestion. Eyes: Negative for double vision. Respiratory: Negative for cough and shortness of breath.     Cardiovascular: Negative for ches Finger-to-nose coordination is intact. Romberg (-)  Tandem walking is fine. DIAGNOSIS:  1.  Cervical spondylosis with radiculopathy  - OP REFERRAL TO [facility]  - ACUPUNCTURE [place] - INTERNAL REFERRAL       ASSESSMENT: flexibility, postural controls, and strength achieved during the treatment. It is during this transition that environmental and behavioral issues are addressed and modifications to prevent recurrence and exacerbations are implemented.     2.   Cervical tra treatment for acute radiculopathy due to degenerative disc disease.  With correct diagnosis by a physician, acupuncture is very effective, if not the most effective, anti-inflammatory electro-therapeutics for treating acute and chronic low back pain and rel

## 2020-08-26 ENCOUNTER — TELEPHONE (OUTPATIENT)
Dept: SURGERY | Facility: CLINIC | Age: 65
End: 2020-08-26

## 2020-08-26 NOTE — TELEPHONE ENCOUNTER
Received fax from [facility] stating that tramadol order sent 8-24 by Dr Keyon Mckeon has a drug interaction with amitriptyline. Will forward to Keyon Mckeon for recommendation.

## 2020-09-14 ENCOUNTER — APPOINTMENT (OUTPATIENT)
Dept: PHYSICAL THERAPY | Facility: HOSPITAL | Age: 65
End: 2020-09-14
Attending: NEUROLOGICAL SURGERY
Payer: MEDICARE

## 2020-09-14 ENCOUNTER — TELEPHONE (OUTPATIENT)
Dept: PHYSICAL THERAPY | Facility: HOSPITAL | Age: 65
End: 2020-09-14

## 2020-09-17 ENCOUNTER — APPOINTMENT (OUTPATIENT)
Dept: PHYSICAL THERAPY | Facility: HOSPITAL | Age: 65
End: 2020-09-17
Attending: NEUROLOGICAL SURGERY
Payer: MEDICARE

## 2020-09-23 ENCOUNTER — TELEPHONE (OUTPATIENT)
Dept: PHYSICAL THERAPY | Facility: HOSPITAL | Age: 65
End: 2020-09-23

## 2020-09-24 ENCOUNTER — APPOINTMENT (OUTPATIENT)
Dept: PHYSICAL THERAPY | Facility: HOSPITAL | Age: 65
End: 2020-09-24
Attending: NEUROLOGICAL SURGERY
Payer: MEDICARE

## 2020-09-28 ENCOUNTER — OFFICE VISIT (OUTPATIENT)
Dept: PHYSICAL THERAPY | Facility: HOSPITAL | Age: 65
End: 2020-09-28
Attending: NEUROLOGICAL SURGERY
Payer: MEDICARE

## 2020-09-28 PROCEDURE — 97110 THERAPEUTIC EXERCISES: CPT | Performed by: PHYSICAL THERAPIST

## 2020-09-28 PROCEDURE — 97162 PT EVAL MOD COMPLEX 30 MIN: CPT | Performed by: PHYSICAL THERAPIST

## 2020-09-28 NOTE — PROGRESS NOTES
SPINE EVALUATION:   Referring Physician: Dr. Gardenia Feliciano  Diagnosis: Cervical spondylosis with radiculopathy (K06.92)     Date of Service: 9/28/2020     PATIENT SUMMARY   Yoel Reina is a 63year old female who presents to therapy today with complaints Unspecified essential hypertension      Pt denies diplopia, dysarthria, dysphasia, dizziness, drop attacks, bowel/bladder changes, saddle anesthesia, and JESS LE N/T.    ASSESSMENT  Maninder Cortez presents to physical therapy evaluation with primary c/o neck, mynoru to assess due to pain . Rotation: R 35; L 15  Pain , with tingling and tremors in left UE with both movement,s left>right . Accessory motion: unable to assess. Palpation: atrophy  of left scap border, left UE.     Strength: (* denotes performed with p Complexity decision making due to 3+ personal factors/comorbidities, 3 body structures involved/activity limitations, and evolving symptoms including changing pain levels.   PLAN OF CARE:    Goals: (to be met in 8 visits)   · Pt will improve cervical AROM f Yes  I certify the need for these services furnished under this plan of treatment and while under my care.     X___________________________________________________ Date____________________    Certification From: 2/18/0365  To:12/27/2020

## 2020-10-01 ENCOUNTER — TELEPHONE (OUTPATIENT)
Dept: PHYSICAL THERAPY | Age: 65
End: 2020-10-01

## 2020-10-01 ENCOUNTER — APPOINTMENT (OUTPATIENT)
Dept: PHYSICAL THERAPY | Facility: HOSPITAL | Age: 65
End: 2020-10-01
Attending: NEUROLOGICAL SURGERY
Payer: MEDICARE

## 2020-10-05 ENCOUNTER — APPOINTMENT (OUTPATIENT)
Dept: PHYSICAL THERAPY | Facility: HOSPITAL | Age: 65
End: 2020-10-05
Attending: NEUROLOGICAL SURGERY
Payer: MEDICARE

## 2020-10-05 ENCOUNTER — TELEPHONE (OUTPATIENT)
Dept: PHYSICAL THERAPY | Facility: HOSPITAL | Age: 65
End: 2020-10-05

## 2020-10-05 ENCOUNTER — TELEPHONE (OUTPATIENT)
Dept: PHYSICAL THERAPY | Age: 65
End: 2020-10-05

## 2020-10-08 ENCOUNTER — OFFICE VISIT (OUTPATIENT)
Dept: PHYSICAL THERAPY | Facility: HOSPITAL | Age: 65
End: 2020-10-08
Attending: NEUROLOGICAL SURGERY
Payer: MEDICARE

## 2020-10-08 ENCOUNTER — OFFICE VISIT (OUTPATIENT)
Dept: NEUROLOGY | Facility: CLINIC | Age: 65
End: 2020-10-08
Payer: MEDICARE

## 2020-10-08 VITALS
HEART RATE: 96 BPM | TEMPERATURE: 97 F | BODY MASS INDEX: 23.04 KG/M2 | HEIGHT: 63 IN | RESPIRATION RATE: 16 BRPM | SYSTOLIC BLOOD PRESSURE: 149 MMHG | WEIGHT: 130 LBS | DIASTOLIC BLOOD PRESSURE: 98 MMHG

## 2020-10-08 DIAGNOSIS — M54.12 CERVICAL RADICULOPATHY: Primary | ICD-10-CM

## 2020-10-08 PROCEDURE — 99213 OFFICE O/P EST LOW 20 MIN: CPT | Performed by: OTHER

## 2020-10-08 PROCEDURE — 97112 NEUROMUSCULAR REEDUCATION: CPT | Performed by: PHYSICAL THERAPIST

## 2020-10-08 PROCEDURE — 97140 MANUAL THERAPY 1/> REGIONS: CPT | Performed by: PHYSICAL THERAPIST

## 2020-10-08 RX ORDER — AMITRIPTYLINE HYDROCHLORIDE 10 MG/1
20 TABLET, FILM COATED ORAL NIGHTLY
COMMUNITY
End: 2020-10-08

## 2020-10-08 RX ORDER — AMITRIPTYLINE HYDROCHLORIDE 25 MG/1
25 TABLET, FILM COATED ORAL NIGHTLY
Qty: 90 TABLET | Refills: 0 | Status: SHIPPED | OUTPATIENT
Start: 2020-10-08 | End: 2021-12-23 | Stop reason: CLARIF

## 2020-10-08 NOTE — PROGRESS NOTES
[facility] Progress Note    HPI  Patient presents with:  Neck Pain: C/O of left arm pain      As per my initial H&P from 7/21/2020,   \" Stacy Vasquez is a 60year old, who presents for evaluation of left shoulder pain.       Patient ([facility].) 01/2019    right leg   • Depression    • Esophageal reflux    • Essential hypertension    • Extrinsic asthma, unspecified    • Fibroids    • High blood pressure    • Hypernatremia    • Influenza A    • Migraines    • Other and unspecified hyperlipide Monohydrate (SPIRIVA RESPIMAT) 2.5 MCG/ACT Inhalation Aero Soln, Inhale 2 puffs into the lungs daily. , Disp: 1 Inhaler, Rfl: 0  •  predniSONE 20 MG Oral Tab, Take 40 mg for 2 days THEN 20 mg for 2 days, THEN 10 mg for 2 days, THEN STOP., Disp: 7 tablet, Rf 16   Ht 63\"   Wt 130 lb (59 kg)   BMI 23.03 kg/m²   Estimated body mass index is 23.03 kg/m² as calculated from the following:    Height as of this encounter: 63\". Weight as of this encounter: 130 lb (59 kg).     Gen: well developed, well nourished, no hypertrophy. No spinal canal or foraminal narrowing. C3-C4:  Mild posterior disc osteophyte complex with mild to moderate facet and uncinate hypertrophy. No spinal canal stenosis. Moderate left and no right foraminal narrowing.      C4-C5:  Mild post in the left deltoid, as well as the left hand, particularly in the left ADM muscle, but also noted in the left APB and FPL. Overall, this appears most consistent with a cervical radiculopathy.        MRI cervical spine was done and showed multilevel degene

## 2020-10-08 NOTE — PROGRESS NOTES
Dx: Cervical spondylosis with radiculopathy (M47.22)         Insurance (Authorized # of Visits):  6           Authorizing Physician: Dr. Gisele Tyson  Next MD visit: none scheduled  Fall Risk: standard         Precautions: n/a             Subjective: arm tingli · Pt will demonstrate improved cervical intrinsic strength to 4/5 to allow improved cervical stabilization with overhead reaching (8 visits)  · Pt will improve postural strength (mid/low trap) to 5/5 to promote improved upright posturing and decreased pa

## 2020-10-15 ENCOUNTER — TELEPHONE (OUTPATIENT)
Dept: PHYSICAL THERAPY | Facility: HOSPITAL | Age: 65
End: 2020-10-15

## 2020-10-15 ENCOUNTER — APPOINTMENT (OUTPATIENT)
Dept: PHYSICAL THERAPY | Facility: HOSPITAL | Age: 65
End: 2020-10-15
Attending: NEUROLOGICAL SURGERY
Payer: MEDICARE

## 2020-10-26 ENCOUNTER — OFFICE VISIT (OUTPATIENT)
Dept: SURGERY | Facility: CLINIC | Age: 65
End: 2020-10-26
Payer: MEDICARE

## 2020-10-26 VITALS
RESPIRATION RATE: 16 BRPM | SYSTOLIC BLOOD PRESSURE: 130 MMHG | DIASTOLIC BLOOD PRESSURE: 80 MMHG | WEIGHT: 130 LBS | OXYGEN SATURATION: 98 % | BODY MASS INDEX: 23.04 KG/M2 | HEIGHT: 63 IN | HEART RATE: 108 BPM

## 2020-10-26 DIAGNOSIS — M47.22 CERVICAL SPONDYLOSIS WITH RADICULOPATHY: Primary | ICD-10-CM

## 2020-10-26 PROCEDURE — 99213 OFFICE O/P EST LOW 20 MIN: CPT | Performed by: NEUROLOGICAL SURGERY

## 2020-10-26 RX ORDER — TRAMADOL HYDROCHLORIDE 50 MG/1
50 TABLET ORAL
Qty: 60 TABLET | Refills: 0 | Status: ON HOLD | OUTPATIENT
Start: 2020-10-26 | End: 2021-02-17

## 2020-10-26 NOTE — PROGRESS NOTES
[facility]  Neurological Surgery Follow Up Clinic Note    Stacy Vasquez 63year old, female    1955 MRN [id number]   PCP PHYSICIAN NONSTAFF Allergies   Influenza Vaccines      HIVES, SWELLING  Onion                   HIVES, S not interested in surgery at this time. 2. The patient states she was unable to get acupuncture when I prescribed it to her because she did not have the money for it.   However she does have the money for now and she has set up an appointment to do that

## 2020-10-29 ENCOUNTER — APPOINTMENT (OUTPATIENT)
Dept: PHYSICAL THERAPY | Facility: HOSPITAL | Age: 65
End: 2020-10-29
Attending: NEUROLOGICAL SURGERY
Payer: MEDICARE

## 2020-10-29 ENCOUNTER — TELEPHONE (OUTPATIENT)
Dept: PHYSICAL THERAPY | Age: 65
End: 2020-10-29

## 2020-11-05 ENCOUNTER — APPOINTMENT (OUTPATIENT)
Dept: PHYSICAL THERAPY | Facility: HOSPITAL | Age: 65
End: 2020-11-05
Attending: NEUROLOGICAL SURGERY
Payer: MEDICARE

## 2020-11-16 ENCOUNTER — OFFICE VISIT (OUTPATIENT)
Dept: PAIN CLINIC | Facility: CLINIC | Age: 65
End: 2020-11-16
Payer: MEDICARE

## 2020-11-16 ENCOUNTER — TELEPHONE (OUTPATIENT)
Dept: PAIN CLINIC | Facility: CLINIC | Age: 65
End: 2020-11-16

## 2020-11-16 VITALS
RESPIRATION RATE: 16 BRPM | BODY MASS INDEX: 23.04 KG/M2 | HEIGHT: 63 IN | DIASTOLIC BLOOD PRESSURE: 82 MMHG | WEIGHT: 130 LBS | HEART RATE: 100 BPM | SYSTOLIC BLOOD PRESSURE: 136 MMHG

## 2020-11-16 DIAGNOSIS — M54.12 CERVICAL RADICULOPATHY: Primary | ICD-10-CM

## 2020-11-16 PROCEDURE — 99203 OFFICE O/P NEW LOW 30 MIN: CPT | Performed by: ANESTHESIOLOGY

## 2020-11-16 RX ORDER — GABAPENTIN 300 MG/1
300 CAPSULE ORAL 3 TIMES DAILY
Qty: 90 CAPSULE | Refills: 0 | Status: SHIPPED | OUTPATIENT
Start: 2020-11-16

## 2020-11-16 NOTE — TELEPHONE ENCOUNTER
Patient scheduled for a pain management procedure, pre-procedure instructions reviewed. Patient advised procedure will be done under local anesthesia, no requirement for patient to have a ride or have been fasted prior to procedure.       Patient advised Medication:   Number of days you need to be off for the following medications:  ? Aggrenox 10 days   ? Agrylin (Anagrelide) 10 days  ? Brilinta (Ticagrelor) 7 days  ? Imbruvica (Ibrutinib) 3 days   ? Enbrel (Etanercept) 24 hours   ?  Fragmin (Dalteparin) 24 Please call our office with any questions or concerns before or after your procedure at  [phone].   If you are a diabetic, please increase the frequency of your glucose monitoring after the procedure as this may cause a temporary increase in your blood

## 2020-11-16 NOTE — PROGRESS NOTES
Location of Pain: left neck and all the way down to the arm     Date Pain Began: 4 months ago          Work Related:   No        Receiving Work Comp/Disability:   Yes    Numeric Rating Scale:  Pain at Present:  9

## 2020-11-16 NOTE — H&P
Name: Reji Jo   : 1955   DOS: 2020     Chief complaint: Cervical radiculopathy    History of present illness:  Reji Jo is a 63year old female referred for evaluation of left sided neck pain with radiation down the entire • lidocaine 5 % External Patch Place 2 patches onto the skin daily. 30 patch 0   • Tiotropium Bromide Monohydrate (SPIRIVA RESPIMAT) 2.5 MCG/ACT Inhalation Aero Soln Inhale 2 puffs into the lungs daily.  1 Inhaler 0   • predniSONE 20 MG Oral Tab Take 40 m Cigarettes        Start date: 1/29/1970      Smokeless tobacco: Never Used      Tobacco comment: now smoking 1/2 pk a day    Alcohol use:  Yes      Alcohol/week: 28.0 standard drinks      Types: 28 Cans of beer per week      Frequency: 4 or more times a wee diagnosis). Plan:     The patient is a 73-year-old female who presents today for evaluation of a 3-month history of neck pain with left-sided radicular symptoms. Her symptoms consistent with imaging findings of cervical radiculopathy.   She is been tr

## 2020-11-16 NOTE — TELEPHONE ENCOUNTER
Medical clearance needed- Per Dr. Eamon Bradley, no clearance needed however patient does need to hold ASA 325mg for 7 days. Implanted cardiac device/SCS/PNS: NA    Pt seen in OV today by Dr. Eamon Bradley and recommended for DEVEN (X 1).      Laterality: NA  Level(s): NA

## 2020-11-18 ENCOUNTER — TELEPHONE (OUTPATIENT)
Dept: PHYSICAL THERAPY | Facility: HOSPITAL | Age: 65
End: 2020-11-18

## 2020-11-21 ENCOUNTER — APPOINTMENT (OUTPATIENT)
Dept: LAB | Facility: HOSPITAL | Age: 65
End: 2020-11-21
Attending: ANESTHESIOLOGY
Payer: MEDICARE

## 2020-11-21 DIAGNOSIS — M54.12 CERVICAL RADICULOPATHY: ICD-10-CM

## 2020-11-24 NOTE — TELEPHONE ENCOUNTER
Spoke with patient to reschedule 11/24 inj. Patient stated she is too nervous at this time yet wanted to reschedule. Patient asked if she was going to be completely asleep for this inj, patient was advised she will only have twilight sedation.  Patient requ

## 2020-12-01 ENCOUNTER — APPOINTMENT (OUTPATIENT)
Dept: PHYSICAL THERAPY | Facility: HOSPITAL | Age: 65
End: 2020-12-01
Payer: MEDICARE

## 2020-12-04 ENCOUNTER — APPOINTMENT (OUTPATIENT)
Dept: PHYSICAL THERAPY | Facility: HOSPITAL | Age: 65
End: 2020-12-04
Payer: MEDICARE

## 2020-12-15 NOTE — TELEPHONE ENCOUNTER
Spoke with patient to reschedule injection to 12/22 checking in at 7:15am (case time 8:15am). Patient was advised to call Central Scheduling to schedule COVID test, provided CS phone number.  Reviewed all relevant pre-procedure instructions, medication hold

## 2020-12-21 NOTE — TELEPHONE ENCOUNTER
Cristian Wright RN from procedure lab called to advise that pt has not yet completed Covid test for procedure scheduled tomorrow, 12/22.  After reviewing chart, noted that pt has cancelled procedure twice and has expressed nervousness pertaining to completing proce

## 2021-01-13 RX ORDER — DIAZEPAM 10 MG/1
10 TABLET ORAL ONCE AS NEEDED
Qty: 1 TABLET | Refills: 0 | Status: SHIPPED | OUTPATIENT
Start: 2021-01-13 | End: 2021-01-13

## 2021-01-13 NOTE — TELEPHONE ENCOUNTER
Spoke with Corine Cassidy from procedure lab, advised patient did not yet complete her COVID test for tomorrows procedure (1/14).  Patient has now rescheduled multiple times, patient was advised she will need to have procedure with local only, offered valium an

## 2021-01-14 NOTE — TELEPHONE ENCOUNTER
Phoebe Knox RN from procedure lab called to say patient did not show up and per Dr. Maday Krueger, patient is not to be rescheduled again due to the multiple cancellations and no shows.

## 2021-01-14 NOTE — TELEPHONE ENCOUNTER
Juventino Richmond RN from procedure lab called to say patient was a no show for todays injection (1/14). Juventino Richmond stated if patient does not show up she will remove case from OR schedule and place in [place]. No further needs.

## 2021-02-15 NOTE — TELEPHONE ENCOUNTER
LMTCB    If patient calls back, patient needs to be seen in OV to discuss POC with Dr. Eamon Bradley.

## 2021-02-15 NOTE — TELEPHONE ENCOUNTER
Pt calling to schedule reschedule injection. Please advise on how to address pt due to all of her no shows.

## 2021-02-16 ENCOUNTER — APPOINTMENT (OUTPATIENT)
Dept: CT IMAGING | Facility: HOSPITAL | Age: 66
End: 2021-02-16
Attending: EMERGENCY MEDICINE
Payer: MEDICARE

## 2021-02-16 ENCOUNTER — HOSPITAL ENCOUNTER (OUTPATIENT)
Facility: HOSPITAL | Age: 66
Setting detail: OBSERVATION
Discharge: HOME | End: 2021-02-17
Attending: EMERGENCY MEDICINE | Admitting: HOSPITALIST
Payer: MEDICARE

## 2021-02-16 ENCOUNTER — APPOINTMENT (OUTPATIENT)
Dept: GENERAL RADIOLOGY | Facility: HOSPITAL | Age: 66
End: 2021-02-16
Attending: EMERGENCY MEDICINE
Payer: MEDICARE

## 2021-02-16 ENCOUNTER — APPOINTMENT (OUTPATIENT)
Dept: CV DIAGNOSTICS | Facility: HOSPITAL | Age: 66
End: 2021-02-16
Attending: HOSPITALIST
Payer: MEDICARE

## 2021-02-16 DIAGNOSIS — R07.89 CHEST PAIN RADIATING TO ARM: Primary | ICD-10-CM

## 2021-02-16 DIAGNOSIS — R55 SYNCOPE AND COLLAPSE: ICD-10-CM

## 2021-02-16 PROBLEM — E87.0 HYPERNATREMIA: Status: ACTIVE | Noted: 2021-02-16

## 2021-02-16 LAB
ALBUMIN SERPL-MCNC: 3.6 G/DL (ref 3.4–5)
ALBUMIN/GLOB SERPL: 1.1 {RATIO} (ref 1–2)
ALP LIVER SERPL-CCNC: 64 U/L
ALT SERPL-CCNC: 28 U/L
ANION GAP SERPL CALC-SCNC: 9 MMOL/L (ref 0–18)
AST SERPL-CCNC: 21 U/L (ref 15–37)
ATRIAL RATE: 90 BPM
BASOPHILS # BLD AUTO: 0.02 X10(3) UL (ref 0–0.2)
BASOPHILS NFR BLD AUTO: 0.4 %
BILIRUB SERPL-MCNC: 0.3 MG/DL (ref 0.1–2)
BUN BLD-MCNC: 15 MG/DL (ref 7–18)
BUN/CREAT SERPL: 12.3 (ref 10–20)
CALCIUM BLD-MCNC: 8.3 MG/DL (ref 8.5–10.1)
CHLORIDE SERPL-SCNC: 109 MMOL/L (ref 98–112)
CO2 SERPL-SCNC: 27 MMOL/L (ref 21–32)
CREAT BLD-MCNC: 1.22 MG/DL
DEPRECATED RDW RBC AUTO: 44.7 FL (ref 35.1–46.3)
EOSINOPHIL # BLD AUTO: 0.06 X10(3) UL (ref 0–0.7)
EOSINOPHIL NFR BLD AUTO: 1.2 %
ERYTHROCYTE [DISTWIDTH] IN BLOOD BY AUTOMATED COUNT: 13.4 % (ref 11–15)
GLOBULIN PLAS-MCNC: 3.2 G/DL (ref 2.8–4.4)
GLUCOSE BLD-MCNC: 100 MG/DL (ref 70–99)
HCT VFR BLD AUTO: 39.8 %
HGB BLD-MCNC: 13.7 G/DL
IMM GRANULOCYTES # BLD AUTO: 0.01 X10(3) UL (ref 0–1)
IMM GRANULOCYTES NFR BLD: 0.2 %
LYMPHOCYTES # BLD AUTO: 2.25 X10(3) UL (ref 1–4)
LYMPHOCYTES NFR BLD AUTO: 45.8 %
M PROTEIN MFR SERPL ELPH: 6.8 G/DL (ref 6.4–8.2)
MCH RBC QN AUTO: 31.2 PG (ref 26–34)
MCHC RBC AUTO-ENTMCNC: 34.4 G/DL (ref 31–37)
MCV RBC AUTO: 90.7 FL
MONOCYTES # BLD AUTO: 0.56 X10(3) UL (ref 0.1–1)
MONOCYTES NFR BLD AUTO: 11.4 %
NEUTROPHILS # BLD AUTO: 2.01 X10 (3) UL (ref 1.5–7.7)
NEUTROPHILS # BLD AUTO: 2.01 X10(3) UL (ref 1.5–7.7)
NEUTROPHILS NFR BLD AUTO: 41 %
OSMOLALITY SERPL CALC.SUM OF ELEC: 301 MOSM/KG (ref 275–295)
P AXIS: 76 DEGREES
P-R INTERVAL: 168 MS
PLATELET # BLD AUTO: 203 10(3)UL (ref 150–450)
POTASSIUM SERPL-SCNC: 3.2 MMOL/L (ref 3.5–5.1)
Q-T INTERVAL: 366 MS
QRS DURATION: 82 MS
QTC CALCULATION (BEZET): 447 MS
R AXIS: 43 DEGREES
RBC # BLD AUTO: 4.39 X10(6)UL
SARS-COV-2 RNA RESP QL NAA+PROBE: NOT DETECTED
SARS-COV-2 RNA RESP QL NAA+PROBE: NOT DETECTED
SODIUM SERPL-SCNC: 145 MMOL/L (ref 136–145)
T AXIS: 77 DEGREES
TROPONIN I SERPL-MCNC: <0.045 NG/ML (ref ?–0.04)
VENTRICULAR RATE: 90 BPM
WBC # BLD AUTO: 4.9 X10(3) UL (ref 4–11)

## 2021-02-16 PROCEDURE — 93306 TTE W/DOPPLER COMPLETE: CPT | Performed by: HOSPITALIST

## 2021-02-16 PROCEDURE — 71045 X-RAY EXAM CHEST 1 VIEW: CPT | Performed by: EMERGENCY MEDICINE

## 2021-02-16 PROCEDURE — 72125 CT NECK SPINE W/O DYE: CPT | Performed by: EMERGENCY MEDICINE

## 2021-02-16 PROCEDURE — 70450 CT HEAD/BRAIN W/O DYE: CPT | Performed by: EMERGENCY MEDICINE

## 2021-02-16 RX ORDER — ENOXAPARIN SODIUM 100 MG/ML
40 INJECTION SUBCUTANEOUS DAILY
Status: DISCONTINUED | OUTPATIENT
Start: 2021-02-16 | End: 2021-02-17

## 2021-02-16 RX ORDER — GABAPENTIN 300 MG/1
300 CAPSULE ORAL 3 TIMES DAILY
Status: DISCONTINUED | OUTPATIENT
Start: 2021-02-16 | End: 2021-02-16

## 2021-02-16 RX ORDER — LISINOPRIL 40 MG/1
40 TABLET ORAL DAILY
Status: DISCONTINUED | OUTPATIENT
Start: 2021-02-16 | End: 2021-02-17

## 2021-02-16 RX ORDER — METOPROLOL SUCCINATE 50 MG/1
100 TABLET, EXTENDED RELEASE ORAL
Status: DISCONTINUED | OUTPATIENT
Start: 2021-02-16 | End: 2021-02-17

## 2021-02-16 RX ORDER — ASPIRIN 325 MG
325 TABLET, DELAYED RELEASE (ENTERIC COATED) ORAL DAILY
Status: DISCONTINUED | OUTPATIENT
Start: 2021-02-16 | End: 2021-02-17

## 2021-02-16 RX ORDER — NICOTINE 21 MG/24HR
1 PATCH, TRANSDERMAL 24 HOURS TRANSDERMAL DAILY
Status: DISCONTINUED | OUTPATIENT
Start: 2021-02-16 | End: 2021-02-17

## 2021-02-16 RX ORDER — ASPIRIN 81 MG/1
324 TABLET, CHEWABLE ORAL ONCE
Status: COMPLETED | OUTPATIENT
Start: 2021-02-16 | End: 2021-02-16

## 2021-02-16 RX ORDER — LORAZEPAM 1 MG/1
2 TABLET ORAL
Status: DISCONTINUED | OUTPATIENT
Start: 2021-02-16 | End: 2021-02-17

## 2021-02-16 RX ORDER — SODIUM PHOSPHATE, DIBASIC AND SODIUM PHOSPHATE, MONOBASIC 7; 19 G/133ML; G/133ML
1 ENEMA RECTAL ONCE AS NEEDED
Status: DISCONTINUED | OUTPATIENT
Start: 2021-02-16 | End: 2021-02-17

## 2021-02-16 RX ORDER — FOLIC ACID 1 MG/1
1 TABLET ORAL DAILY
Status: DISCONTINUED | OUTPATIENT
Start: 2021-02-16 | End: 2021-02-17

## 2021-02-16 RX ORDER — CYCLOBENZAPRINE HCL 10 MG
10 TABLET ORAL 3 TIMES DAILY PRN
Status: DISCONTINUED | OUTPATIENT
Start: 2021-02-16 | End: 2021-02-17

## 2021-02-16 RX ORDER — LORAZEPAM 2 MG/ML
1 INJECTION INTRAMUSCULAR
Status: DISCONTINUED | OUTPATIENT
Start: 2021-02-16 | End: 2021-02-17

## 2021-02-16 RX ORDER — BISACODYL 10 MG
10 SUPPOSITORY, RECTAL RECTAL
Status: DISCONTINUED | OUTPATIENT
Start: 2021-02-16 | End: 2021-02-17

## 2021-02-16 RX ORDER — LORAZEPAM 1 MG/1
1 TABLET ORAL
Status: DISCONTINUED | OUTPATIENT
Start: 2021-02-16 | End: 2021-02-17

## 2021-02-16 RX ORDER — ACETAMINOPHEN 325 MG/1
650 TABLET ORAL EVERY 4 HOURS PRN
Status: DISCONTINUED | OUTPATIENT
Start: 2021-02-16 | End: 2021-02-17

## 2021-02-16 RX ORDER — DICYCLOMINE HCL 20 MG
20 TABLET ORAL
Status: DISCONTINUED | OUTPATIENT
Start: 2021-02-16 | End: 2021-02-17

## 2021-02-16 RX ORDER — POLYETHYLENE GLYCOL 3350 17 G/17G
17 POWDER, FOR SOLUTION ORAL DAILY PRN
Status: DISCONTINUED | OUTPATIENT
Start: 2021-02-16 | End: 2021-02-17

## 2021-02-16 RX ORDER — DOCUSATE SODIUM 100 MG/1
100 CAPSULE, LIQUID FILLED ORAL 2 TIMES DAILY
Status: DISCONTINUED | OUTPATIENT
Start: 2021-02-16 | End: 2021-02-17

## 2021-02-16 RX ORDER — CLONIDINE HYDROCHLORIDE 0.1 MG/1
0.1 TABLET ORAL 3 TIMES DAILY PRN
Status: DISCONTINUED | OUTPATIENT
Start: 2021-02-16 | End: 2021-02-17

## 2021-02-16 RX ORDER — ASPIRIN 81 MG/1
TABLET, CHEWABLE ORAL
Status: COMPLETED
Start: 2021-02-16 | End: 2021-02-16

## 2021-02-16 RX ORDER — MELATONIN
100 DAILY
Status: DISCONTINUED | OUTPATIENT
Start: 2021-02-16 | End: 2021-02-17

## 2021-02-16 RX ORDER — HYDROCODONE BITARTRATE AND ACETAMINOPHEN 5; 325 MG/1; MG/1
1 TABLET ORAL ONCE
Status: COMPLETED | OUTPATIENT
Start: 2021-02-16 | End: 2021-02-16

## 2021-02-16 RX ORDER — LORAZEPAM 2 MG/ML
2 INJECTION INTRAMUSCULAR
Status: DISCONTINUED | OUTPATIENT
Start: 2021-02-16 | End: 2021-02-17

## 2021-02-16 RX ORDER — PANTOPRAZOLE SODIUM 40 MG/1
40 TABLET, DELAYED RELEASE ORAL
Status: DISCONTINUED | OUTPATIENT
Start: 2021-02-16 | End: 2021-02-17

## 2021-02-16 RX ORDER — TRAMADOL HYDROCHLORIDE 50 MG/1
50 TABLET ORAL EVERY 6 HOURS PRN
Status: DISCONTINUED | OUTPATIENT
Start: 2021-02-16 | End: 2021-02-17

## 2021-02-16 RX ORDER — ONDANSETRON 2 MG/ML
4 INJECTION INTRAMUSCULAR; INTRAVENOUS EVERY 6 HOURS PRN
Status: DISCONTINUED | OUTPATIENT
Start: 2021-02-16 | End: 2021-02-17

## 2021-02-16 RX ORDER — ACETAMINOPHEN 325 MG/1
650 TABLET ORAL EVERY 6 HOURS PRN
Status: DISCONTINUED | OUTPATIENT
Start: 2021-02-16 | End: 2021-02-16

## 2021-02-16 RX ORDER — HYDROCODONE BITARTRATE AND ACETAMINOPHEN 5; 325 MG/1; MG/1
2 TABLET ORAL EVERY 4 HOURS PRN
Status: DISCONTINUED | OUTPATIENT
Start: 2021-02-16 | End: 2021-02-17

## 2021-02-16 RX ORDER — ACETAMINOPHEN 500 MG
1000 TABLET ORAL ONCE
Status: DISCONTINUED | OUTPATIENT
Start: 2021-02-16 | End: 2021-02-16

## 2021-02-16 RX ORDER — HYDROCHLOROTHIAZIDE 25 MG/1
25 TABLET ORAL DAILY
Status: DISCONTINUED | OUTPATIENT
Start: 2021-02-16 | End: 2021-02-16

## 2021-02-16 RX ORDER — SODIUM CHLORIDE 9 MG/ML
INJECTION, SOLUTION INTRAVENOUS CONTINUOUS
Status: DISCONTINUED | OUTPATIENT
Start: 2021-02-16 | End: 2021-02-17

## 2021-02-16 RX ORDER — AMLODIPINE BESYLATE 5 MG/1
10 TABLET ORAL DAILY
Status: DISCONTINUED | OUTPATIENT
Start: 2021-02-16 | End: 2021-02-17

## 2021-02-16 RX ORDER — ALBUTEROL SULFATE 90 UG/1
4 AEROSOL, METERED RESPIRATORY (INHALATION) 4 TIMES DAILY
Status: DISCONTINUED | OUTPATIENT
Start: 2021-02-16 | End: 2021-02-17

## 2021-02-16 RX ORDER — HYDROCODONE BITARTRATE AND ACETAMINOPHEN 5; 325 MG/1; MG/1
1 TABLET ORAL EVERY 4 HOURS PRN
Status: DISCONTINUED | OUTPATIENT
Start: 2021-02-16 | End: 2021-02-17

## 2021-02-16 RX ORDER — AMITRIPTYLINE HYDROCHLORIDE 25 MG/1
25 TABLET, FILM COATED ORAL NIGHTLY
Status: DISCONTINUED | OUTPATIENT
Start: 2021-02-16 | End: 2021-02-17

## 2021-02-16 RX ORDER — PROCHLORPERAZINE EDISYLATE 5 MG/ML
5 INJECTION INTRAMUSCULAR; INTRAVENOUS EVERY 8 HOURS PRN
Status: DISCONTINUED | OUTPATIENT
Start: 2021-02-16 | End: 2021-02-17

## 2021-02-16 RX ORDER — DOXEPIN HYDROCHLORIDE 50 MG/1
1 CAPSULE ORAL DAILY
Status: DISCONTINUED | OUTPATIENT
Start: 2021-02-16 | End: 2021-02-17

## 2021-02-16 NOTE — PROGRESS NOTES
[facility]  Progress Note     Willie Rupa Patient Status:  Observation    1955 MRN [id number]   [facility] 8NE-A Attending Abdulaziz Salter DO   Hosp Day # 0 PCP PHYSICIAN NONSTAFF     Chief Complaint: Syncope    S: Patient Epic.    Medications:   • Amitriptyline HCl  25 mg Oral Nightly   • amLODIPine Besylate  10 mg Oral Daily   • aspirin  325 mg Oral Daily   • Fluticasone Furoate-Vilanterol  1 puff Inhalation Daily   • gabapentin  300 mg Oral TID   • hydrochlorothiazide  25 Lovenox  · CODE status: Full  · Samson: No  · Central line: No    Will the patient be referred to [facility] on discharge?: No  Estimated date of discharge: 1-2 days  Discharge is dependent on: Clinical status  At this point Ms. Griselda Skipper is expected to be discharge

## 2021-02-16 NOTE — PROGRESS NOTES
Pt received at 0730. History of ETOH abuse. MD ordered CIWA. Transfer order received. Report given to [facility] on CTU3.
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
## 2021-02-16 NOTE — PHYSICAL THERAPY NOTE
PHYSICAL THERAPY QUICK EVALUATION - INPATIENT    Room Number: 7542/4498-R  Evaluation Date: 2/16/2021  Presenting Problem: chest pain radiating to arm, syncopal episode after hitting head   Physician Order: PT Eval and Treat    Problem List  Principal Pr strength are within functional limits     Lower extremity ROM is within functional limits     Lower extremity strength is grossly 5-/5 on R 4/5 on L     NEUROLOGICAL FINDINGS  Neurological Findings: Sensation           Sensation: intact       ACTIVITY TOLE demonstrates adequate strength for stair negotiation with assistance of daughter. Stairs not tested due to patient's isolation precautions, unable to leave the room.  Patient feels confident that she can safely negotiate stairs at home and daughter works fr
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
## 2021-02-16 NOTE — H&P
[facility]  History and Physical     [facility] Patient Status:  Observation    1955 MRN [id number]   [facility] 8NE-A Attending Mayo Petersen [address] Day # 0 PCP PHYSICIAN NONSTAFF     Chief Complaint: sync History:  reports that she has been smoking cigarettes. She started smoking about 51 years ago. She has been smoking about 2.00 packs per day.  She has never used smokeless tobacco. She reports current alcohol use of about 30.0 standard drinks of alcohol pe mg by mouth daily. , Disp: , Rfl:     •  Fluticasone Furoate-Vilanterol 200-25 MCG/INH Inhalation Aerosol Powder, Breath Activated, Inhale 1 puff into the lungs daily. , Disp: , Rfl:     •  hydrochlorothiazide 25 MG Oral Tab, Take 25 mg by mouth daily. , Disp organomegaly. Neurologic: No focal neurological deficits. CNII-XII grossly intact. Musculoskeletal: Moves all extremities. Extremities: No edema or cyanosis. Integument: No rashes or lesions. Psychiatric: Appropriate mood and affect.       Diagnostic

## 2021-02-16 NOTE — PLAN OF CARE
NURSING ADMISSION NOTE      Patient admitted via Cart  Oriented to room. Safety precautions initiated. Bed in low position. Call light in reach.   Navigator completed  Staff will continue to monitor

## 2021-02-16 NOTE — BH LEVEL OF CARE ASSESSMENT
Level of Care Assessment Note    General Questions  Why are you here?: states had chest pain  Precipitating Events: Patient drinking etoh daily and was placed on the ciwa protocol.   History of Present Illness: 62 y/o female who admits to a long history of Alcohol Use  How often do you have a drink containing alcohol? : 4 or more times per week  Alcohol Use  Age at first use?: 18  Route: Oral  Average amount used? tx.    Risk/Protective Factors  Risk Factors: Substance use or abuse  Protective Factors: her family    Motivational Stage of Change  Motivational Stage of Change: Pre-Contemplative    Level of Care Recommendations  Consulted with: Dr. Soheila Steward of Care

## 2021-02-16 NOTE — PLAN OF CARE
Pt. A&Ox4  RA  Tele- NSR  BP elevated 156/100's; MD notified. No new orders. Voids   Up with standby assist  Cardiac diet  0.9 @ 100  PT/OT to see  Pt drinks a case of beer a day. Said shes been feeling \"depressed\".  When asked if her drinking is becomi behavior  - Position to facilitate oxygenation and minimize respiratory effort  - Oxygen supplementation based on oxygen saturation or ABGs  - Provide Smoking Cessation handout, if applicable  - Encourage broncho-pulmonary hygiene including cough, deep pedro

## 2021-02-16 NOTE — ED PROVIDER NOTES
Patient Seen in: [facility] Emergency Department      History   Patient presents with:  Chest Pain Angina    Stated Complaint: Chest pain    HPI/Subjective:   HPI    72-year-old with a history of COPD, DVT, alcohol abuse, hypertension, high choleste Packs/day: 2.00        Types: Cigarettes        Start date: 1/29/1970      Smokeless tobacco: Never Used      Tobacco comment: now smoking 2 pk a day    Alcohol use:  Yes      Alcohol/week: 30.0 standard drinks      Types: 30 Cans of beer per week      Freq Non- 47 (*)     GFR, -American 54 (*)     All other components within normal limits   TROPONIN I - Normal   RAPID SARS-COV-2 BY PCR - Normal   CBC WITH DIFFERENTIAL WITH PLATELET    Narrative:      The following orders were created fo

## 2021-02-16 NOTE — OCCUPATIONAL THERAPY NOTE
Att'd to see pt this AM. Pt was with echo and then preparing for transfer to other unit. OT will continue to follow and re-attempt at later date.

## 2021-02-16 NOTE — PLAN OF CARE
Received pt from CTU 8  A&Ox4 calm and cooperative  Vs wnl, RA, NSR  Up with LEN DUEÑAS Q2 hours, pt states she drinks 12 pack of beer daily and last drink was 2/15 around 5  No s/s of withdrawal at this time  Pt states has never had detox seizure  Jarad Smoking Cessation handout, if applicable  - Encourage broncho-pulmonary hygiene including cough, deep breathe, Incentive Spirometry  - Assess the need for suctioning and perform as needed  - Assess and instruct to report SOB or any respiratory difficulty

## 2021-02-17 VITALS
RESPIRATION RATE: 28 BRPM | OXYGEN SATURATION: 97 % | WEIGHT: 121.69 LBS | SYSTOLIC BLOOD PRESSURE: 145 MMHG | TEMPERATURE: 98 F | HEART RATE: 92 BPM | HEIGHT: 63 IN | BODY MASS INDEX: 21.56 KG/M2 | DIASTOLIC BLOOD PRESSURE: 94 MMHG

## 2021-02-17 LAB
ANION GAP SERPL CALC-SCNC: 5 MMOL/L (ref 0–18)
BASOPHILS # BLD AUTO: 0.02 X10(3) UL (ref 0–0.2)
BASOPHILS NFR BLD AUTO: 0.5 %
BUN BLD-MCNC: 9 MG/DL (ref 7–18)
BUN/CREAT SERPL: 14.5 (ref 10–20)
CALCIUM BLD-MCNC: 8.5 MG/DL (ref 8.5–10.1)
CHLORIDE SERPL-SCNC: 111 MMOL/L (ref 98–112)
CO2 SERPL-SCNC: 28 MMOL/L (ref 21–32)
CREAT BLD-MCNC: 0.62 MG/DL
DEPRECATED RDW RBC AUTO: 46 FL (ref 35.1–46.3)
EOSINOPHIL # BLD AUTO: 0.07 X10(3) UL (ref 0–0.7)
EOSINOPHIL NFR BLD AUTO: 1.7 %
ERYTHROCYTE [DISTWIDTH] IN BLOOD BY AUTOMATED COUNT: 13.6 % (ref 11–15)
GLUCOSE BLD-MCNC: 89 MG/DL (ref 70–99)
HCT VFR BLD AUTO: 38.3 %
HGB BLD-MCNC: 12.8 G/DL
IMM GRANULOCYTES # BLD AUTO: 0.02 X10(3) UL (ref 0–1)
IMM GRANULOCYTES NFR BLD: 0.5 %
LYMPHOCYTES # BLD AUTO: 1.67 X10(3) UL (ref 1–4)
LYMPHOCYTES NFR BLD AUTO: 39.9 %
MCH RBC QN AUTO: 31 PG (ref 26–34)
MCHC RBC AUTO-ENTMCNC: 33.4 G/DL (ref 31–37)
MCV RBC AUTO: 92.7 FL
MONOCYTES # BLD AUTO: 0.51 X10(3) UL (ref 0.1–1)
MONOCYTES NFR BLD AUTO: 12.2 %
NEUTROPHILS # BLD AUTO: 1.9 X10 (3) UL (ref 1.5–7.7)
NEUTROPHILS # BLD AUTO: 1.9 X10(3) UL (ref 1.5–7.7)
NEUTROPHILS NFR BLD AUTO: 45.2 %
OSMOLALITY SERPL CALC.SUM OF ELEC: 296 MOSM/KG (ref 275–295)
PLATELET # BLD AUTO: 186 10(3)UL (ref 150–450)
POTASSIUM SERPL-SCNC: 3.2 MMOL/L (ref 3.5–5.1)
RBC # BLD AUTO: 4.13 X10(6)UL
SODIUM SERPL-SCNC: 144 MMOL/L (ref 136–145)
WBC # BLD AUTO: 4.2 X10(3) UL (ref 4–11)

## 2021-02-17 PROCEDURE — 99217 OBSERVATION CARE DISCHARGE: CPT | Performed by: INTERNAL MEDICINE

## 2021-02-17 RX ORDER — POTASSIUM CHLORIDE 20 MEQ/1
40 TABLET, EXTENDED RELEASE ORAL EVERY 4 HOURS
Status: DISCONTINUED | OUTPATIENT
Start: 2021-02-17 | End: 2021-02-17

## 2021-02-17 RX ORDER — TRAMADOL HYDROCHLORIDE 50 MG/1
50 TABLET ORAL
Qty: 5 TABLET | Refills: 0 | Status: ON HOLD | OUTPATIENT
Start: 2021-02-17 | End: 2021-05-27

## 2021-02-17 NOTE — BH PROGRESS NOTE
Yesterday when the pt was seen for her etoh history. She said, she was not interested in any cd treatment. In her discharge summary the number to [facility] was placed in case she changes her mind. Also the  AA number was placed there too.

## 2021-02-17 NOTE — DISCHARGE SUMMARY
[facility] HOSPITALIST  DISCHARGE SUMMARY     Mickey Garciabay Patient Status:  Observation    1955 MRN [id number]   [facility] 3NE-A Attending Angelia Grace DO   Hosp Day # 0 PCP PHYSICIAN NONSTAFF     Date of Admission:  pain radiated from cervical spine to shoulder/chest) and TTE, telemetry, EKG, and troponin were unremarkable.  Discharged to home with recommendation to follow up closely with PCP    Lace+ Score: 60  59-90 High Risk  29-58 Medium Risk  0-28   Low Risk mouth every 6 (six) hours as needed for Pain (takes for severe back and leg pain. ). Takes for lumbar pain and leg pain. Has been taking 5 years. Refills: 0     lidocaine 5 % Ptch  Commonly known as: LIDODERM      Place 2 patches onto the skin daily.    Jennifer Haines Gabbi [address] 30 min [address], [place]    Patient Instructions:                         Vital signs:  Temp:  [98.2 °F (36.8 °C)-98.3 °F (36.8 °C)] 98.3 °F (36.8 °C)  Pulse:  [08-7

## 2021-02-17 NOTE — PROGRESS NOTES
NURSING DISCHARGE NOTE    Discharged Home via Wheelchair. Accompanied by Support staff  Belongings Taken by patient/family. Discharge navigator completed. All questions answered. Pt stable upon discharge.

## 2021-02-17 NOTE — CM/SW NOTE
02/17/21 1300   CM/SW Referral Data   Referral Source Social Work (self-referral)   Reason for Referral Discharge planning   Informant Patient   Social History   Recreational Drug/Alcohol Use no   Major Changes Last 6 Months no   Domestic/Partner Violen

## 2021-02-17 NOTE — PLAN OF CARE
Assumed care at 0730. A&Ox4. VSS, RA, NSR on tele. CIWAs Q2. IVF infusing 0.9 at 100ml. Cardiac diet. Ambulating. Electrolyte protocol. Potassium replaced per protocol. Seizure precautions. Will continue to monitor and update.      Problem: Patient/Family G assess for edema, trend weights  Outcome: Progressing     Problem: RESPIRATORY - ADULT  Goal: Achieves optimal ventilation and oxygenation  Description: INTERVENTIONS:  - Assess for changes in respiratory status  - Assess for changes in mentation and behav

## 2021-02-17 NOTE — PLAN OF CARE
Received patient awake in bed. AOx4. On CIWA protocol- has mild anxiety, mild tremor and mild headache. NSR on tele. Electrolyte protocol. Seizure precaution- rails padded, and suction at bedside. Received Norco for pain on her back.      Problem: Patient/F perfusion - ex.  Angina  - Evaluate fluid balance, assess for edema, trend weights  Outcome: Progressing     Problem: RESPIRATORY - ADULT  Goal: Achieves optimal ventilation and oxygenation  Description: INTERVENTIONS:  - Assess for changes in respiratory s

## 2021-02-17 NOTE — DIETARY NOTE
[address] [facility]     Admitting diagnosis:  Syncope and collapse [R55]  Chest pain radiating to arm [R07.89]    Ht: 160 cm (5' 3\")  Wt: 55.2 kg (121 lb 11.1 oz). Body mass index is 21.56 kg/m².   IBW: 52.3 kg    La

## 2021-02-17 NOTE — OCCUPATIONAL THERAPY NOTE
OCCUPATIONAL THERAPY QUICK EVALUATION - INPATIENT    Room Number: 0422/6270-X  Evaluation Date: 2/17/2021     Type of Evaluation: Quick Eval  Presenting Problem: chest pain radiating to arm    Physician Order: IP Consult to Occupational Therapy  Reason for bed depending on strength/energy that day (recliner is on main level of home). Pt's family helps her up the stairs as needed. SUBJECTIVE  \"I do those things every day. \" re: bathing, dressing, toileting. Patient self-stated goal is to go home.     O dressing olive/doffing socks at IND while seated. Pt returned to semi-supine position in bed with all needs met, call light within reach, RN aware of session. Patient End of Session: In bed; With [facility] staff;Needs met;Call light within reach;RN aware o

## 2021-05-26 ENCOUNTER — APPOINTMENT (OUTPATIENT)
Dept: CT IMAGING | Facility: HOSPITAL | Age: 66
DRG: 065 | End: 2021-05-26
Attending: EMERGENCY MEDICINE
Payer: MEDICARE

## 2021-05-26 ENCOUNTER — HOSPITAL ENCOUNTER (INPATIENT)
Facility: HOSPITAL | Age: 66
LOS: 3 days | Discharge: NURSING, REHAB OR LONG-TERM CARE | DRG: 065 | End: 2021-05-30
Attending: EMERGENCY MEDICINE | Admitting: HOSPITALIST
Payer: MEDICARE

## 2021-05-26 DIAGNOSIS — I63.9 CEREBROVASCULAR ACCIDENT (CVA), UNSPECIFIED MECHANISM (HCC): Primary | ICD-10-CM

## 2021-05-26 PROCEDURE — 70450 CT HEAD/BRAIN W/O DYE: CPT | Performed by: EMERGENCY MEDICINE

## 2021-05-27 ENCOUNTER — APPOINTMENT (OUTPATIENT)
Dept: MRI IMAGING | Facility: HOSPITAL | Age: 66
DRG: 065 | End: 2021-05-27
Attending: INTERNAL MEDICINE
Payer: MEDICARE

## 2021-05-27 ENCOUNTER — APPOINTMENT (OUTPATIENT)
Dept: CV DIAGNOSTICS | Facility: HOSPITAL | Age: 66
DRG: 065 | End: 2021-05-27
Attending: HOSPITALIST
Payer: MEDICARE

## 2021-05-27 ENCOUNTER — NURSE ONLY (OUTPATIENT)
Dept: ELECTROPHYSIOLOGY | Facility: HOSPITAL | Age: 66
DRG: 065 | End: 2021-05-27
Attending: Other
Payer: MEDICARE

## 2021-05-27 PROBLEM — I63.9 CEREBROVASCULAR ACCIDENT (CVA), UNSPECIFIED MECHANISM (HCC): Status: ACTIVE | Noted: 2021-05-27

## 2021-05-27 PROBLEM — I63.9 CEREBROVASCULAR ACCIDENT (CVA) (HCC): Status: ACTIVE | Noted: 2021-05-27

## 2021-05-27 PROBLEM — I63.9 CEREBROVASCULAR ACCIDENT (CVA), UNSPECIFIED MECHANISM (HCC): Status: ACTIVE | Noted: 2021-01-01

## 2021-05-27 PROBLEM — I63.9 CEREBROVASCULAR ACCIDENT (CVA) (HCC): Status: ACTIVE | Noted: 2021-01-01

## 2021-05-27 PROCEDURE — 99223 1ST HOSP IP/OBS HIGH 75: CPT | Performed by: OTHER

## 2021-05-27 PROCEDURE — 93306 TTE W/DOPPLER COMPLETE: CPT | Performed by: HOSPITALIST

## 2021-05-27 PROCEDURE — 70544 MR ANGIOGRAPHY HEAD W/O DYE: CPT | Performed by: INTERNAL MEDICINE

## 2021-05-27 PROCEDURE — 95819 EEG AWAKE AND ASLEEP: CPT | Performed by: OTHER

## 2021-05-27 PROCEDURE — 70551 MRI BRAIN STEM W/O DYE: CPT | Performed by: INTERNAL MEDICINE

## 2021-05-27 PROCEDURE — 70549 MR ANGIOGRAPH NECK W/O&W/DYE: CPT | Performed by: INTERNAL MEDICINE

## 2021-05-27 PROCEDURE — 99223 1ST HOSP IP/OBS HIGH 75: CPT | Performed by: HOSPITALIST

## 2021-05-27 RX ORDER — MELATONIN
100 DAILY
Status: DISCONTINUED | OUTPATIENT
Start: 2021-05-27 | End: 2021-05-30

## 2021-05-27 RX ORDER — SODIUM CHLORIDE 9 MG/ML
INJECTION, SOLUTION INTRAVENOUS CONTINUOUS
Status: ACTIVE | OUTPATIENT
Start: 2021-05-27 | End: 2021-05-27

## 2021-05-27 RX ORDER — ALBUTEROL SULFATE 90 UG/1
2 AEROSOL, METERED RESPIRATORY (INHALATION) EVERY 4 HOURS PRN
Status: DISCONTINUED | OUTPATIENT
Start: 2021-05-27 | End: 2021-05-30

## 2021-05-27 RX ORDER — ATORVASTATIN CALCIUM 80 MG/1
80 TABLET, FILM COATED ORAL NIGHTLY
Status: ON HOLD | COMMUNITY
Start: 2021-05-10 | End: 2021-05-29

## 2021-05-27 RX ORDER — LORAZEPAM 2 MG/ML
0.5 INJECTION INTRAMUSCULAR
Status: COMPLETED | OUTPATIENT
Start: 2021-05-27 | End: 2021-05-27

## 2021-05-27 RX ORDER — DOCUSATE SODIUM 100 MG/1
100 CAPSULE, LIQUID FILLED ORAL 2 TIMES DAILY PRN
Status: DISCONTINUED | OUTPATIENT
Start: 2021-05-27 | End: 2021-05-30

## 2021-05-27 RX ORDER — HYDROCODONE BITARTRATE AND ACETAMINOPHEN 10; 325 MG/1; MG/1
1 TABLET ORAL EVERY 6 HOURS PRN
Status: DISCONTINUED | OUTPATIENT
Start: 2021-05-27 | End: 2021-05-30

## 2021-05-27 RX ORDER — ACETAMINOPHEN 650 MG/1
650 SUPPOSITORY RECTAL EVERY 4 HOURS PRN
Status: DISCONTINUED | OUTPATIENT
Start: 2021-05-27 | End: 2021-05-30

## 2021-05-27 RX ORDER — METHION/INOS/CHOL BT/B COM/LIV 110MG-86MG
100 CAPSULE ORAL DAILY
COMMUNITY
Start: 2021-03-26 | End: 2021-12-23 | Stop reason: CLARIF

## 2021-05-27 RX ORDER — ACETAMINOPHEN 325 MG/1
650 TABLET ORAL EVERY 4 HOURS PRN
Status: DISCONTINUED | OUTPATIENT
Start: 2021-05-27 | End: 2021-05-30

## 2021-05-27 RX ORDER — SODIUM CHLORIDE 9 MG/ML
INJECTION, SOLUTION INTRAVENOUS CONTINUOUS
Status: ACTIVE | OUTPATIENT
Start: 2021-05-27 | End: 2021-05-29

## 2021-05-27 RX ORDER — AMITRIPTYLINE HYDROCHLORIDE 25 MG/1
25 TABLET, FILM COATED ORAL NIGHTLY
Status: DISCONTINUED | OUTPATIENT
Start: 2021-05-27 | End: 2021-05-30

## 2021-05-27 RX ORDER — ENOXAPARIN SODIUM 100 MG/ML
40 INJECTION SUBCUTANEOUS DAILY
Status: DISCONTINUED | OUTPATIENT
Start: 2021-05-27 | End: 2021-05-30

## 2021-05-27 RX ORDER — IPRATROPIUM BROMIDE AND ALBUTEROL SULFATE 2.5; .5 MG/3ML; MG/3ML
3 SOLUTION RESPIRATORY (INHALATION) EVERY 6 HOURS PRN
Status: DISCONTINUED | OUTPATIENT
Start: 2021-05-27 | End: 2021-05-28

## 2021-05-27 RX ORDER — ATORVASTATIN CALCIUM 80 MG/1
80 TABLET, FILM COATED ORAL NIGHTLY
Status: DISCONTINUED | OUTPATIENT
Start: 2021-05-27 | End: 2021-05-28

## 2021-05-27 RX ORDER — NICOTINE 21 MG/24HR
1 PATCH, TRANSDERMAL 24 HOURS TRANSDERMAL DAILY
COMMUNITY
Start: 2021-04-14

## 2021-05-27 RX ORDER — ENOXAPARIN SODIUM 100 MG/ML
40 INJECTION SUBCUTANEOUS DAILY
Status: DISCONTINUED | OUTPATIENT
Start: 2021-05-27 | End: 2021-05-27

## 2021-05-27 RX ORDER — CYCLOBENZAPRINE HCL 10 MG
10 TABLET ORAL 3 TIMES DAILY PRN
Status: DISCONTINUED | OUTPATIENT
Start: 2021-05-27 | End: 2021-05-30

## 2021-05-27 RX ORDER — MELATONIN
3 NIGHTLY PRN
Status: DISCONTINUED | OUTPATIENT
Start: 2021-05-27 | End: 2021-05-30

## 2021-05-27 RX ORDER — DICYCLOMINE HCL 20 MG
20 TABLET ORAL EVERY 4 HOURS PRN
Status: DISCONTINUED | OUTPATIENT
Start: 2021-05-27 | End: 2021-05-30

## 2021-05-27 RX ORDER — LABETALOL HYDROCHLORIDE 5 MG/ML
10 INJECTION, SOLUTION INTRAVENOUS EVERY 10 MIN PRN
Status: DISCONTINUED | OUTPATIENT
Start: 2021-05-27 | End: 2021-05-30

## 2021-05-27 RX ORDER — TRAMADOL HYDROCHLORIDE 50 MG/1
50 TABLET ORAL EVERY 6 HOURS PRN
Status: DISCONTINUED | OUTPATIENT
Start: 2021-05-27 | End: 2021-05-30

## 2021-05-27 RX ORDER — HYDRALAZINE HYDROCHLORIDE 20 MG/ML
10 INJECTION INTRAMUSCULAR; INTRAVENOUS EVERY 4 HOURS PRN
Status: DISCONTINUED | OUTPATIENT
Start: 2021-05-27 | End: 2021-05-30

## 2021-05-27 RX ORDER — METOCLOPRAMIDE HYDROCHLORIDE 5 MG/ML
10 INJECTION INTRAMUSCULAR; INTRAVENOUS EVERY 8 HOURS PRN
Status: DISCONTINUED | OUTPATIENT
Start: 2021-05-27 | End: 2021-05-30

## 2021-05-27 RX ORDER — HYDROCODONE BITARTRATE AND ACETAMINOPHEN 10; 325 MG/1; MG/1
1 TABLET ORAL EVERY 8 HOURS PRN
Status: ON HOLD | COMMUNITY
End: 2021-05-30

## 2021-05-27 RX ORDER — FOLIC ACID 1 MG/1
1 TABLET ORAL DAILY
COMMUNITY
Start: 2021-03-27

## 2021-05-27 RX ORDER — NICOTINE 21 MG/24HR
1 PATCH, TRANSDERMAL 24 HOURS TRANSDERMAL DAILY
Status: DISCONTINUED | OUTPATIENT
Start: 2021-05-27 | End: 2021-05-30

## 2021-05-27 RX ORDER — DIPHENOXYLATE HYDROCHLORIDE AND ATROPINE SULFATE 2.5; .025 MG/1; MG/1
1 TABLET ORAL DAILY
COMMUNITY
Start: 2021-03-26 | End: 2021-12-23 | Stop reason: CLARIF

## 2021-05-27 RX ORDER — HYDROMORPHONE HYDROCHLORIDE 1 MG/ML
0.5 INJECTION, SOLUTION INTRAMUSCULAR; INTRAVENOUS; SUBCUTANEOUS EVERY 30 MIN PRN
Status: ACTIVE | OUTPATIENT
Start: 2021-05-27 | End: 2021-05-27

## 2021-05-27 RX ORDER — GABAPENTIN 300 MG/1
300 CAPSULE ORAL 3 TIMES DAILY
Status: DISCONTINUED | OUTPATIENT
Start: 2021-05-27 | End: 2021-05-30

## 2021-05-27 RX ORDER — ONDANSETRON 2 MG/ML
4 INJECTION INTRAMUSCULAR; INTRAVENOUS EVERY 6 HOURS PRN
Status: DISCONTINUED | OUTPATIENT
Start: 2021-05-27 | End: 2021-05-30

## 2021-05-27 RX ORDER — ASPIRIN 325 MG
325 TABLET ORAL DAILY
Status: DISCONTINUED | OUTPATIENT
Start: 2021-05-27 | End: 2021-05-28

## 2021-05-27 RX ORDER — ONDANSETRON 2 MG/ML
4 INJECTION INTRAMUSCULAR; INTRAVENOUS EVERY 4 HOURS PRN
Status: DISCONTINUED | OUTPATIENT
Start: 2021-05-27 | End: 2021-05-27

## 2021-05-27 RX ORDER — ASPIRIN 300 MG
300 SUPPOSITORY, RECTAL RECTAL ONCE
Status: COMPLETED | OUTPATIENT
Start: 2021-05-27 | End: 2021-05-27

## 2021-05-27 RX ORDER — PANTOPRAZOLE SODIUM 40 MG/1
40 TABLET, DELAYED RELEASE ORAL
Status: DISCONTINUED | OUTPATIENT
Start: 2021-05-27 | End: 2021-05-30

## 2021-05-27 RX ORDER — METHYLPREDNISOLONE SODIUM SUCCINATE 40 MG/ML
40 INJECTION, POWDER, LYOPHILIZED, FOR SOLUTION INTRAMUSCULAR; INTRAVENOUS EVERY 12 HOURS
Status: DISCONTINUED | OUTPATIENT
Start: 2021-05-27 | End: 2021-05-29

## 2021-05-27 RX ORDER — ASPIRIN 300 MG
300 SUPPOSITORY, RECTAL RECTAL DAILY
Status: DISCONTINUED | OUTPATIENT
Start: 2021-05-27 | End: 2021-05-28

## 2021-05-27 RX ORDER — HYDRALAZINE HYDROCHLORIDE 20 MG/ML
10 INJECTION INTRAMUSCULAR; INTRAVENOUS EVERY 2 HOUR PRN
Status: DISCONTINUED | OUTPATIENT
Start: 2021-05-27 | End: 2021-05-27

## 2021-05-27 NOTE — PHYSICAL THERAPY NOTE
PHYSICAL THERAPY EVALUATION - INPATIENT     Room Number: 0427/4934-K  Evaluation Date: 5/27/2021  Type of Evaluation: Initial  Physician Order: PT Eval and Treat    Presenting Problem: CVA   Reason for Therapy: Mobility Dysfunction and Discharge Plan ([facility] 01/2019    right leg   • Depression    • Esophageal reflux    • Essential hypertension    • Extrinsic asthma, unspecified    • Fibroids    • High blood pressure    • Hypernatremia    • Influenza A    • Migraines    • Other and unspecified hyperlipide shoulder - see OT note for more details. Lower extremity ROM is within functional limits    Lower extremity strength:   Pt able to perform glute bridge in supine. Sitting EOB pt able to perform B ankle pumps.  Pt able to perform R LAQ through partial RO wheelchair)?: A Lot   -   Need to walk in hospital room?: Total   -   Climbing 3-5 steps with a railing?: Total       AM-PAC Score:  Raw Score: 11   Approx Degree of Impairment: 72.57%   Standardized Score (AM-PAC Scale): 33.86   CMS Modifier (G-Code): CL take steps for SPT from bed to chair positioned on her R side with max assist and cues for sequencing and positioning. RLE ataxic and some buckling with steps. Pt reeached back with RUE for chair arm rest, took increased time to find and grab.  Pt amaya re-educate;Strengthening;Stair training;Balance training;Transfer training  Rehab Potential : Good  Frequency (Obs): 3-5x/week  Number of Visits to Meet Established Goals: 10      CURRENT GOALS    Goal #1 Patient is able to demonstrate supine - sit EOB @ l

## 2021-05-27 NOTE — ED INITIAL ASSESSMENT (HPI)
PT c/o sciatica pain in R buttocks that extends down leg. PT reports pain has gotten worse today after falling. PT reports hitting head, denies LOC, denies blood thinner use.

## 2021-05-27 NOTE — PHYSICAL THERAPY NOTE
PT orders received and chart reviewed. Attempted to see pt for PT eval, however, pt occupied with ECHO. Will follow and re-attempt as able and appropriate.

## 2021-05-27 NOTE — PLAN OF CARE
Assumed patient care at 0730   VSS, alert x4  Neuro assessments per protocol, right sided weakness, decreased sensation, right facial droop   Ok for soft foods, thin liquids per ST   ECHO completed   EEG completed   PT recommending AR   MRI this evening

## 2021-05-27 NOTE — ED QUICK NOTES
Pt c/o difficulty breathing. Pt used her own inhaler daughter had brought, but stated it was working. Pt O2 sats @100%. Pl placed on nasal cannula @ 2 liters for comfort. MD aware.

## 2021-05-27 NOTE — ED QUICK NOTES
Pt placed on bedpan. Upon taking pt off of bedpan pt placed on 2L O2 by direction of RN/MD for comfort while pt awaits transport upstairs to their inpatient room.

## 2021-05-27 NOTE — OCCUPATIONAL THERAPY NOTE
OCCUPATIONAL THERAPY            OT order received and chart reviewed. The patient is currently occupied with testing, Will re-attempt as appropriate and as current schedule permits.

## 2021-05-27 NOTE — PROCEDURES
[address]  in [place]  in affiliation with [facility]  [address]  [place], [address] [place] [address]  [phone]  Fax [phone]      Stephens Hodgkins  9/27/1955    Date of testi

## 2021-05-27 NOTE — CONSULTS
Cherie [address]27   Neurology; INITIAL Consultation VISIT  2021, 11:48 AM     Kenna Gonzalez Patient Status:  Inpatient    1955 MRN [id number]   [facility] 7NE-A Attending Sherie Palomo DO     PCP [facility] HISTORY:  family history includes Cancer in her father and mother; Other in her mother. SOCIAL HISTORY:   reports that she has been smoking cigarettes. She started smoking about 51 years ago. She has been smoking about 2.00 packs per day.  She has never times daily as needed. Pantoprazole Sodium 40 MG Oral Tab EC, Take 40 mg by mouth every morning before breakfast.  aspirin 325 MG Oral Tab EC, Take 325 mg by mouth daily. Diclofenac Sodium 1 % Transdermal Gel, Apply 4 g topically 4 (four) times daily. hemiparesis arm and leg  Sensory intact  Gait not tested    Special Test:       DIAGNOSTIC DATA:   Recent Results (from the past 24 hour(s))   EKG 12-LEAD    Collection Time: 05/26/21 10:09 PM   Result Value Ref Range    Ventricular rate 91 BPM    Atrial r 0.10 - 1.00 x10(3) uL    Eosinophil Absolute 0.10 0.00 - 0.70 x10(3) uL    Basophil Absolute 0.03 0.00 - 0.20 x10(3) uL    Immature Granulocyte Absolute 0.03 0.00 - 1.00 x10(3) uL    Neutrophil % 49.8 %    Lymphocyte % 38.7 %    Monocyte % 9.0 %    Eosinop uL    Eosinophil Absolute 0.10 0.00 - 0.70 x10(3) uL    Basophil Absolute 0.02 0.00 - 0.20 x10(3) uL    Immature Granulocyte Absolute 0.02 0.00 - 1.00 x10(3) uL    Neutrophil % 37.3 %    Lymphocyte % 49.9 %    Monocyte % 10.0 %    Eosinophil % 2.0 %    Bas

## 2021-05-27 NOTE — ED PROVIDER NOTES
Patient Seen in: [facility] Emergency Department      History   Patient presents with:  Leg or Foot Injury  Fall    Stated Complaint:     HPI/Subjective:   HPI    62-year-old female who presents here to the emergency department complaining of 4 days Alcohol/week: 30.0 standard drinks      Types: 30 Cans of beer per week    Drug use: Not Currently      Types: \"Crack\" cocaine             Review of Systems    Positive for stated complaint:   Other systems are as noted in HPI.   Constitutional and vital Please view results for these tests on the individual orders. CBC W/ DIFFERENTIAL     EKG    Rate, intervals and axes as noted on EKG Report.   Rate: 91  Rhythm: Sinus Rhythm  Reading: Normal sinus rhythm possible left atrial enlargement nonspecific S ganglia old lacunar infarcts identified.  There are focal areas of decreased attenuation in the left periventricular deep white matter in the left frontal    lobe which may also reflect remote infarcts however the exact age of these lesions is indeterminat and does not have tongue deviation. This may be seizure-like activity related to her injury. I would not give her anything by mouth at this time. She was written for an aspirin suppository. I spoke to general neuro as well as the hospital service.   I d

## 2021-05-27 NOTE — H&P
[facility] HOSPITALIST  History and Physical     Mya Anderson Patient Status:  Inpatient    1955 MRN [id number]   [facility] 7NE-A Attending Do Nam MD   Hosp Day # 0 PCP PHYSICIAN NONSTAFF     Chief Complaint: weakness    Hi Family History   Problem Relation Age of Onset   • Cancer Father    • Cancer Mother    • Other (Other) Mother        Allergies:   Influenza Vaccines      HIVES, SWELLING  Onion                   HIVES, SWELLING  Penicillins               Gabapentin mouth 3 (three) times daily. , Disp: 90 capsule, Rfl: 0  Amitriptyline HCl 25 MG Oral Tab, Take 1 tablet (25 mg total) by mouth nightly., Disp: 90 tablet, Rfl: 0  Diclofenac Sodium 1 % Transdermal Gel, Apply 4 g topically 4 (four) times daily. , Disp: 1 Tube (based on SCr of 0.83 mg/dL). No results for input(s): PTP, INR in the last 168 hours. No results for input(s): TROP, CK in the last 168 hours. Imaging: Imaging data reviewed in Epic.       ASSESSMENT / PLAN:     1. R sided weakness Likely acute CV

## 2021-05-27 NOTE — ED QUICK NOTES
Pt removed from bedpan. Pt now moaning ,not talking. This RN noted pt tongue deviated to right.  MD notified

## 2021-05-27 NOTE — PLAN OF CARE
Admitted pt to floor at 0300. Pt AOx4. 2LNC.  .  NSR. Pt NPO. Stroke Protocol.  K=2.9. Replaced in ER. Seizure Precautions. Pt no c/o pain. Covid negative. Will continue to monitor.

## 2021-05-28 PROBLEM — I63.81 LACUNAR INFARCT, ACUTE (HCC): Status: ACTIVE | Noted: 2021-01-01

## 2021-05-28 PROBLEM — I63.81 LACUNAR INFARCT, ACUTE (HCC): Status: ACTIVE | Noted: 2021-05-28

## 2021-05-28 PROCEDURE — 99233 SBSQ HOSP IP/OBS HIGH 50: CPT | Performed by: OTHER

## 2021-05-28 PROCEDURE — 99232 SBSQ HOSP IP/OBS MODERATE 35: CPT | Performed by: INTERNAL MEDICINE

## 2021-05-28 RX ORDER — ATORVASTATIN CALCIUM 10 MG/1
10 TABLET, FILM COATED ORAL NIGHTLY
Status: DISCONTINUED | OUTPATIENT
Start: 2021-05-28 | End: 2021-05-30

## 2021-05-28 RX ORDER — CLOPIDOGREL BISULFATE 75 MG/1
75 TABLET ORAL DAILY
Status: DISCONTINUED | OUTPATIENT
Start: 2021-05-28 | End: 2021-05-30

## 2021-05-28 RX ORDER — ASPIRIN 81 MG/1
81 TABLET, CHEWABLE ORAL DAILY
Status: DISCONTINUED | OUTPATIENT
Start: 2021-05-29 | End: 2021-05-30

## 2021-05-28 RX ORDER — IPRATROPIUM BROMIDE AND ALBUTEROL SULFATE 2.5; .5 MG/3ML; MG/3ML
3 SOLUTION RESPIRATORY (INHALATION)
Status: DISCONTINUED | OUTPATIENT
Start: 2021-05-29 | End: 2021-05-30

## 2021-05-28 NOTE — PHYSICAL THERAPY NOTE
PHYSICAL THERAPY TREATMENT NOTE - INPATIENT    Room Number: 6764/3620-Q     Session: 1   Number of Visits to Meet Established Goals: 10    Presenting Problem: CVA   History related to current admission: Patient is a 63year old female admitted on 5/26/202 Other and unspecified hyperlipidemia    • Renal mass    • Stroke ([facility] [address]     \"In the 80's\"   • Tachycardia    • Unspecified essential hypertension        Past Surgical History  Past Surgical History:   Procedure Laterality Date   • TOTAL ABDOM HYSTERECTOMY Vitals assessed. AAROm performed in sitting. Dec motor control and strength in RLE.    Pt performed sit to stand transfers with verbal cues for proper technique of hand placement on stable surface, proper body mechanics to facilitate trunk movement and R q ambulate 10 feet with assist device: walker - rolling at assistance level: minimum assistance      Goal #4 Patient is able to demonstrate transfers EOB to/from chair at assistance level: supervision   Goal #5     Goal #6     Goal Comments: Goals establishe

## 2021-05-28 NOTE — OCCUPATIONAL THERAPY NOTE
OCCUPATIONAL THERAPY EVALUATION - INPATIENT     Room Number: 0520/8605-Y  Evaluation Date: 5/28/2021  Type of Evaluation: Initial  Presenting Problem: CVA    Physician Order: IP Consult to Occupational Therapy  Reason for Therapy: ADL/IADL Dysfunction and and Equipment: Standard height toilet  Shower/Tub and Equipment: Tub-shower combo;Grab bar       Occupation/Status:  (retired)  Hand Dominance: Right  Drives: Yes  Patient Regularly Uses: Reading glasses    Prior Level of Function:   Mod I with self care . Opposition  Coordination - Finger to Nose: Right decreased speed;Right decreased accuracy     Coordination - Finger Opposition: Right decreased speed;Right decreased accuracy       ACTIVITY TOLERANCE                         O2 SATURATIONS       ACTIVITIES provided. Patient performed 10 reps finger, wrist ad elbow flexion with supervision. Patient PCT and RN aware of session, chair alarm on , needs met. Patient End of Session: Up in chair; With [facility] staff;Needs met;Call light within reach;RN aware of sessi Discharge Recommendations: Acute rehabilitation       PLAN  OT Treatment Plan: Balance activities; Energy conservation/work simplification techniques;ADL training;Visual perceptual training;Functional transfer training;UE strengthening/ROM; Endurance trainin

## 2021-05-28 NOTE — SLP NOTE
SPEECH DAILY NOTE - INPATIENT    ASSESSMENT & PLAN   ASSESSMENT  Pt seen for dysphagia tx to assess tolerance with recommended diet, ensure proper utilization of aspiration precautions and provide pt/family education.   Patient seen with items from recommen Established Goals: 2    Session: 1 of 2    If you have any questions, please contact Suad Dhaliwal [place], [address] [facility]  Speech Language Pathologist  Pager# [phone]

## 2021-05-28 NOTE — PLAN OF CARE
Assumed patient care at 0730.  VSS   Neuro checks q4h -right side strength and sensation improving   Up to chair   Patient refusing lovenox - discussed risk of blood clots   Insulin added- patient agreeable   Accepted to [facility]- possible DC tomorrow

## 2021-05-28 NOTE — CM/SW NOTE
Per Rn, pt ready for dcCalleen Monks from [facility] working on bed- hopefully for tomorrow. Clay Lopez, [facility]  /Discharge Planner  [phone]    Daughter updated.     Vijaya Goodwin, 05/28/21, 4:55 PM

## 2021-05-28 NOTE — CONSULTS
N[facility]olas [address] Patient Status:  Inpatient    1955 MRN [id number]   [facility] 7NE-A Attending Renea Arthur, [facility] Day # 1 PCP PHYSICIAN NONSTAFF     Patient Identification  Tung Rahman is a 63 y likely represent areas of chronic microhemorrhage and/or focal mineralization.       4. No evidence of intracranial aneurysm, flow-limiting stenosis, or focal arterial occlusion.       5.  No evidence of occlusion, dissection, or flow-limiting stenosis in t Depression    • Esophageal reflux    • Essential hypertension    • Extrinsic asthma, unspecified    • Fibroids    • High blood pressure    • Hypernatremia    • Influenza A    • Migraines    • Other and unspecified hyperlipidemia    • Renal mass    • Stroke injection 10 mg, 10 mg, Intravenous, Q8H PRN  aspirin 300 MG rectal suppository 300 mg, 300 mg, Rectal, Daily   Or  aspirin tab 325 mg, 325 mg, Oral, Daily  Enoxaparin Sodium (LOVENOX) 40 MG/0.4ML injection 40 mg, 40 mg, Subcutaneous, Daily  melatonin tab breastfeeding.     Intake/Output Summary (Last 24 hours) at 5/28/2021 1327  Last data filed at 5/28/2021 0300  Gross per 24 hour   Intake 500 ml   Output 650 ml   Net -150 ml       Physical Exam:                                      General: Alert, cooperat Medical necessity includes  thromboembolic risk, fall risk, seizure risk, medication management,, prevention of pressure ulcer, prevention of aspiration pneumonia. Patient would benefit and is able to participate in 3 hours of therapy daily.  Jonah

## 2021-05-28 NOTE — PROGRESS NOTES
[address] [facility] Neurology Progress Note    Stephens Hodgkins Patient Status:  Inpatient    1955 MRN [id number]   [facility] 7NE-A Attending Yury Taylor,    Hosp Day # 1 PCP [facility] Syncope and collapse     Chronic midline posterior neck pain     MIKE (acute kidney injury) ([address]     Cerebrovascular accident (CVA) ([address]     Cerebrovascular accident (CVA), unspecified mechanism ([address]     Smoker     Type 2 diabetes mellitus with hyperglyc hearing, remainder CN GI   Motor: RUE drift RUE 4/5, RLE 4/5, left side 5/5   Sensory: Intact to light touch, subjective decrease on right side   Coordination: FTN intact, no tremors or dysmetria  Gait: deferred         Labs:  Lab Results   Component Value Oral TID   • Pantoprazole Sodium  40 mg Oral QAM AC   • thiamine  100 mg Oral Daily   • Umeclidinium Bromide  1 puff Inhalation Daily   • aspirin  300 mg Rectal Daily    Or   • aspirin  325 mg Oral Daily   • enoxaparin  40 mg Subcutaneous Daily   • atorvas Kotlik  5/28/2021  7:58 AM  Spectra [phone]

## 2021-05-28 NOTE — CM/SW NOTE
05/28/21 1200   CM/SW Referral Data   Referral Source Physician   Reason for Referral Discharge planning   Informant Patient   Patient Info   Patient's Mental Status Alert;Oriented   Patient's Home Environment [facility]   Number of Levels in Home 2   Pa

## 2021-05-28 NOTE — PLAN OF CARE
Assumed care at 1930  Pt A&Ox4   NSR on tele , VSS   No new neuro deficits noted- see flowsheet   Pt tolerated eating   MRI + for infarct, Neurology notified  SZ precautions in place   POC discussed with pt, family notified of test result per pt request

## 2021-05-29 PROCEDURE — 99232 SBSQ HOSP IP/OBS MODERATE 35: CPT | Performed by: INTERNAL MEDICINE

## 2021-05-29 RX ORDER — LISINOPRIL 40 MG/1
40 TABLET ORAL DAILY
Status: DISCONTINUED | OUTPATIENT
Start: 2021-05-29 | End: 2021-05-30

## 2021-05-29 RX ORDER — PREDNISONE 20 MG/1
40 TABLET ORAL
Status: DISCONTINUED | OUTPATIENT
Start: 2021-05-30 | End: 2021-05-30

## 2021-05-29 RX ORDER — ATORVASTATIN CALCIUM 10 MG/1
10 TABLET, FILM COATED ORAL NIGHTLY
Qty: 30 TABLET | Refills: 1 | Status: SHIPPED | OUTPATIENT
Start: 2021-05-29

## 2021-05-29 RX ORDER — METHION/INOS/CHOL BT/B COM/LIV 110MG-86MG
100 CAPSULE ORAL DAILY
Status: DISCONTINUED | OUTPATIENT
Start: 2021-05-29 | End: 2021-05-29

## 2021-05-29 RX ORDER — ASPIRIN 81 MG/1
81 TABLET, CHEWABLE ORAL DAILY
Qty: 30 TABLET | Refills: 0 | Status: SHIPPED | OUTPATIENT
Start: 2021-05-30

## 2021-05-29 RX ORDER — FOLIC ACID 1 MG/1
1 TABLET ORAL DAILY
Status: DISCONTINUED | OUTPATIENT
Start: 2021-05-29 | End: 2021-05-30

## 2021-05-29 RX ORDER — AMLODIPINE BESYLATE 10 MG/1
10 TABLET ORAL DAILY
Status: DISCONTINUED | OUTPATIENT
Start: 2021-05-29 | End: 2021-05-30

## 2021-05-29 RX ORDER — HYDROCHLOROTHIAZIDE 25 MG/1
25 TABLET ORAL DAILY
Status: DISCONTINUED | OUTPATIENT
Start: 2021-05-29 | End: 2021-05-30

## 2021-05-29 RX ORDER — CLOPIDOGREL BISULFATE 75 MG/1
75 TABLET ORAL DAILY
Qty: 21 TABLET | Refills: 0 | Status: SHIPPED | OUTPATIENT
Start: 2021-05-30

## 2021-05-29 NOTE — PLAN OF CARE
Assumed care at 0730  A&Ox4, VSS  Pt noted to be more tachycardic, HR sustaining 110-130s. Hospitalist notified; labs ordered  Pt reported chest pain 8/10. EKG obtained. Hospitalist notified. Resolved after about 30 minutes.   Ambulating to bathroom with mi

## 2021-05-29 NOTE — PROGRESS NOTES
[facility] HOSPITAL[facility]  Progress Note     Marisa Harrison Patient Status:  Inpatient    1955 MRN [id number]   [facility] 7NE-A Attending Mookie Brothers MD   Hosp Day # 2 PCP PHYSICIAN NONSTAFF     Chief Complaint: headache     S: Patient Pro-Calcitonin  No results for input(s): PCT in the last 168 hours. Cardiac  No results for input(s): TROP, PBNP in the last 168 hours. Creatinine Kinase  No results for input(s): CK in the last 168 hours.     Inflammatory Markers  No results fo

## 2021-05-29 NOTE — CM/SW NOTE
NOE s/w Ada at [facility] to see if they had a bed for pt today. Per [address], pt had IV hydrolazine last night so they could not accept today. [facility] will review the case tomorrow for possible dc to their facility. Unit RN updated. NOE/CM to follow.       Sridhar,

## 2021-05-29 NOTE — PLAN OF CARE
Assumed care of pt at 2330. A&Ox4. On RA. NSR on tele. Neuro checks q4h, right sided weakness. Hydralazine given x1 to maintain BP within parameters. Norco given for pain to right leg. Discharge planning to .     Problem: Patient/Family Goals  Go

## 2021-05-29 NOTE — PLAN OF CARE
Received pt at 1930  Pt AOx4, NSR, RA w/ q4 Neb treatments, VSS  Q4 Neuro. No changes. R side remains weaker than left  D/c Planning: [facility] 5/29  Call light within reach.  All needs currently met    Report given to [address] RN @7670 who will continue care

## 2021-05-30 VITALS
RESPIRATION RATE: 17 BRPM | DIASTOLIC BLOOD PRESSURE: 85 MMHG | HEIGHT: 63 IN | HEART RATE: 100 BPM | WEIGHT: 124 LBS | OXYGEN SATURATION: 98 % | SYSTOLIC BLOOD PRESSURE: 133 MMHG | TEMPERATURE: 98 F | BODY MASS INDEX: 21.97 KG/M2

## 2021-05-30 PROCEDURE — 99232 SBSQ HOSP IP/OBS MODERATE 35: CPT | Performed by: INTERNAL MEDICINE

## 2021-05-30 RX ORDER — IPRATROPIUM BROMIDE AND ALBUTEROL SULFATE 2.5; .5 MG/3ML; MG/3ML
3 SOLUTION RESPIRATORY (INHALATION) EVERY 4 HOURS PRN
Status: DISCONTINUED | OUTPATIENT
Start: 2021-05-30 | End: 2021-05-30

## 2021-05-30 RX ORDER — HYDROCODONE BITARTRATE AND ACETAMINOPHEN 10; 325 MG/1; MG/1
1 TABLET ORAL EVERY 8 HOURS PRN
Qty: 10 TABLET | Refills: 0 | Status: ON HOLD | OUTPATIENT
Start: 2021-05-30 | End: 2021-12-21

## 2021-05-30 RX ORDER — TRAMADOL HYDROCHLORIDE 50 MG/1
50 TABLET ORAL
Qty: 5 TABLET | Refills: 0 | Status: SHIPPED | OUTPATIENT
Start: 2021-05-30 | End: 2021-08-09

## 2021-05-30 RX ORDER — PREDNISONE 20 MG/1
TABLET ORAL
Qty: 7 TABLET | Refills: 0 | Status: SHIPPED | OUTPATIENT
Start: 2021-05-30 | End: 2021-08-09

## 2021-05-30 NOTE — PLAN OF CARE
NURSING DISCHARGE NOTE    Discharged Rehab facility via Wheelchair. Accompanied by Support staff  Belongings Taken by patient/family. PIV and tele dc'd  Medication changes, follow up appointments, and DC instructions reviewed with pt.  Questions and c

## 2021-05-30 NOTE — PLAN OF CARE
Received pt at 1930  Pt AOx4, NSR/ST, RA, VSS  Q4 Neuro. No new deficits. Right sided weakness  PRN Norco/ Tramadol for back pain  D/c Planning: [facility] today? Call light within reach.  All needs currently met      Problem: Patient/Family Goals  Goal: Patient/F report SOB or any respiratory difficulty  - Respiratory Therapy support as indicated  - Manage/alleviate anxiety  - Monitor for signs/symptoms of CO2 retention  Outcome: Progressing     Problem: METABOLIC/FLUID AND ELECTROLYTES - ADULT  Goal: Glucose maint Communicate ordered activity level and limitations with patient/family  Outcome: Progressing  Goal: Maintain proper alignment of affected body part  Description: INTERVENTIONS:  - Support and protect limb and body alignment per provider's orders  - Instruc care  Description: Interventions:  - Assess ability and encourage patient to participate in ADLs to maximize function  - Promote sitting position while performing ADLs such as feeding, grooming, and bathing  - Educate and encourage patient/family in [place]

## 2021-05-30 NOTE — CM/SW NOTE
MSW spoke with Federico Herrera from [facility]. The patient will discharge today to room 1170 by [facility]. Patient is aware of self cost.  PCS completed.       RN report at [facility]: [address] Newell LCSW

## 2021-05-30 NOTE — DISCHARGE SUMMARY
[facility]YCHI[facility]IC CENTER HOSPITALIST  DISCHARGE SUMMARY     Reji Jo Patient Status:  Inpatient    1955 MRN [id number]   [facility] 7NE-A Attending Nurys Menendez MD   Hosp Day # 3 PCP PHYSICIAN NONSTAFF     Date of Admission: 2021  Date o Clopidogrel Bisulfate 75 MG Tabs  Commonly known as: PLAVIX      Take 1 tablet (75 mg total) by mouth daily.    Quantity: 21 tablet  Refills: 0     predniSONE 20 MG Tabs  Commonly known as: DELTASONE      Take 2 tablets on days 1-2 Take 1 tablet on days 3 as: NORCO      Take 1 tablet by mouth every 8 (eight) hours as needed for Pain. Quantity: 10 tablet  Refills: 0     lisinopril 40 MG Tabs      Take 40 mg by mouth daily.    Refills: 0     nicotine 14 MG/24HR Pt24  Commonly known as: NICODERM CQ  Notes to NA    Follow-up appointment:   Valente Simmons MD  [facility] [phone]      call for follow up in 1 month     Nonstaff, Physician  [address]    In 1 week      Appointments for Next 30 D

## 2021-08-03 ENCOUNTER — HOSPITAL ENCOUNTER (EMERGENCY)
Facility: HOSPITAL | Age: 66
Discharge: HOME | End: 2021-08-03
Attending: EMERGENCY MEDICINE
Payer: MEDICARE

## 2021-08-03 ENCOUNTER — APPOINTMENT (OUTPATIENT)
Dept: GENERAL RADIOLOGY | Facility: HOSPITAL | Age: 66
End: 2021-08-03
Attending: EMERGENCY MEDICINE
Payer: MEDICARE

## 2021-08-03 VITALS
OXYGEN SATURATION: 97 % | RESPIRATION RATE: 29 BRPM | BODY MASS INDEX: 22.27 KG/M2 | HEART RATE: 97 BPM | TEMPERATURE: 99 F | DIASTOLIC BLOOD PRESSURE: 102 MMHG | WEIGHT: 125.69 LBS | SYSTOLIC BLOOD PRESSURE: 147 MMHG | HEIGHT: 63 IN

## 2021-08-03 DIAGNOSIS — F10.10 ALCOHOL ABUSE: ICD-10-CM

## 2021-08-03 DIAGNOSIS — J44.1 COPD EXACERBATION (HCC): Primary | ICD-10-CM

## 2021-08-03 LAB
ALBUMIN SERPL-MCNC: 3.9 G/DL (ref 3.4–5)
ALBUMIN/GLOB SERPL: 1.2 {RATIO} (ref 1–2)
ALP LIVER SERPL-CCNC: 66 U/L
ALT SERPL-CCNC: 20 U/L
ANION GAP SERPL CALC-SCNC: 2 MMOL/L (ref 0–18)
AST SERPL-CCNC: 16 U/L (ref 15–37)
ATRIAL RATE: 111 BPM
BASOPHILS # BLD AUTO: 0.03 X10(3) UL (ref 0–0.2)
BASOPHILS NFR BLD AUTO: 0.5 %
BILIRUB SERPL-MCNC: 0.3 MG/DL (ref 0.1–2)
BUN BLD-MCNC: 18 MG/DL (ref 7–18)
CALCIUM BLD-MCNC: 8.5 MG/DL (ref 8.5–10.1)
CHLORIDE SERPL-SCNC: 108 MMOL/L (ref 98–112)
CO2 SERPL-SCNC: 30 MMOL/L (ref 21–32)
CREAT BLD-MCNC: 0.77 MG/DL
EOSINOPHIL # BLD AUTO: 0.33 X10(3) UL (ref 0–0.7)
EOSINOPHIL NFR BLD AUTO: 5.9 %
ERYTHROCYTE [DISTWIDTH] IN BLOOD BY AUTOMATED COUNT: 14 %
ETHANOL SERPL-MCNC: <3 MG/DL (ref ?–3)
GLOBULIN PLAS-MCNC: 3.2 G/DL (ref 2.8–4.4)
GLUCOSE BLD-MCNC: 104 MG/DL (ref 70–99)
HCT VFR BLD AUTO: 41.5 %
HGB BLD-MCNC: 14 G/DL
IMM GRANULOCYTES # BLD AUTO: 0.03 X10(3) UL (ref 0–1)
IMM GRANULOCYTES NFR BLD: 0.5 %
LYMPHOCYTES # BLD AUTO: 2.72 X10(3) UL (ref 1–4)
LYMPHOCYTES NFR BLD AUTO: 48.7 %
M PROTEIN MFR SERPL ELPH: 7.1 G/DL (ref 6.4–8.2)
MCH RBC QN AUTO: 30 PG (ref 26–34)
MCHC RBC AUTO-ENTMCNC: 33.7 G/DL (ref 31–37)
MCV RBC AUTO: 88.9 FL
MONOCYTES # BLD AUTO: 0.61 X10(3) UL (ref 0.1–1)
MONOCYTES NFR BLD AUTO: 10.9 %
NEUTROPHILS # BLD AUTO: 1.87 X10 (3) UL (ref 1.5–7.7)
NEUTROPHILS # BLD AUTO: 1.87 X10(3) UL (ref 1.5–7.7)
NEUTROPHILS NFR BLD AUTO: 33.5 %
OSMOLALITY SERPL CALC.SUM OF ELEC: 292 MOSM/KG (ref 275–295)
P AXIS: 86 DEGREES
P-R INTERVAL: 128 MS
PLATELET # BLD AUTO: 250 10(3)UL (ref 150–450)
POTASSIUM SERPL-SCNC: 3.5 MMOL/L (ref 3.5–5.1)
Q-T INTERVAL: 328 MS
QRS DURATION: 72 MS
QTC CALCULATION (BEZET): 446 MS
R AXIS: 63 DEGREES
RBC # BLD AUTO: 4.67 X10(6)UL
SARS-COV-2 RNA RESP QL NAA+PROBE: NOT DETECTED
SODIUM SERPL-SCNC: 140 MMOL/L (ref 136–145)
T AXIS: 106 DEGREES
TROPONIN I SERPL-MCNC: <0.045 NG/ML (ref ?–0.04)
VENTRICULAR RATE: 111 BPM
WBC # BLD AUTO: 5.6 X10(3) UL (ref 4–11)

## 2021-08-03 PROCEDURE — 99285 EMERGENCY DEPT VISIT HI MDM: CPT

## 2021-08-03 PROCEDURE — 96374 THER/PROPH/DIAG INJ IV PUSH: CPT

## 2021-08-03 PROCEDURE — 94640 AIRWAY INHALATION TREATMENT: CPT

## 2021-08-03 PROCEDURE — 82077 ASSAY SPEC XCP UR&BREATH IA: CPT | Performed by: EMERGENCY MEDICINE

## 2021-08-03 PROCEDURE — 71045 X-RAY EXAM CHEST 1 VIEW: CPT | Performed by: EMERGENCY MEDICINE

## 2021-08-03 PROCEDURE — 93005 ELECTROCARDIOGRAM TRACING: CPT

## 2021-08-03 PROCEDURE — 80053 COMPREHEN METABOLIC PANEL: CPT | Performed by: EMERGENCY MEDICINE

## 2021-08-03 PROCEDURE — 93010 ELECTROCARDIOGRAM REPORT: CPT

## 2021-08-03 PROCEDURE — 85025 COMPLETE CBC W/AUTO DIFF WBC: CPT | Performed by: EMERGENCY MEDICINE

## 2021-08-03 PROCEDURE — 84484 ASSAY OF TROPONIN QUANT: CPT | Performed by: EMERGENCY MEDICINE

## 2021-08-03 RX ORDER — METHYLPREDNISOLONE SODIUM SUCCINATE 125 MG/2ML
125 INJECTION, POWDER, LYOPHILIZED, FOR SOLUTION INTRAMUSCULAR; INTRAVENOUS ONCE
Status: COMPLETED | OUTPATIENT
Start: 2021-08-03 | End: 2021-08-03

## 2021-08-03 RX ORDER — IPRATROPIUM BROMIDE AND ALBUTEROL SULFATE 2.5; .5 MG/3ML; MG/3ML
3 SOLUTION RESPIRATORY (INHALATION) ONCE
Status: COMPLETED | OUTPATIENT
Start: 2021-08-03 | End: 2021-08-03

## 2021-08-03 NOTE — ED PROVIDER NOTES
Patient Seen in: [facility] Emergency Department      History   Patient presents with:  Difficulty Breathing    Stated Complaint: lourdes @ 30 min  98% pulse 106    HPI/Subjective:   HPI    81-year-old female presents emergency department with a history cocaine             Review of Systems    Positive for stated complaint: lourdes @ 30 min  98% pulse 106  Other systems are as noted in HPI. Constitutional and vital signs reviewed. All other systems reviewed and negative except as noted above.     Physica abnormality.       ED Course     Labs Reviewed   COMP METABOLIC PANEL (14) - Abnormal; Notable for the following components:       Result Value    Glucose 104 (*)     All other components within normal limits   TROPONIN I - Normal   ETHYL ALCOHOL - Normal Mediastinum and jesica are unremarkable. Chest wall structures are unremarkable. CONCLUSION:  There is no evidence of active cardiopulmonary disease on this single portable chest radiograph.     Dictated by (CST): Esa Dorman MD on 8/03/2021 at Clinical Impression:  COPD exacerbation ([facility]  (primary encounter diagnosis)  Alcohol abuse     Disposition:  Discharge  8/3/2021  5:18 am    Follow-up:  Nonstaff, Physician  [address] [address]                Medications Prescribed:

## 2021-08-03 NOTE — ED INITIAL ASSESSMENT (HPI)
63year old female with PMHX of COPD, woke up this morning due to a dry hacking cough and tightness across her entire chest. A&Ox4, denies any N/V/D. Past hx of drinking, with last drink today.  CMS intact

## 2021-08-09 ENCOUNTER — APPOINTMENT (OUTPATIENT)
Dept: GENERAL RADIOLOGY | Facility: HOSPITAL | Age: 66
DRG: 190 | End: 2021-08-09
Attending: EMERGENCY MEDICINE
Payer: MEDICARE

## 2021-08-09 ENCOUNTER — HOSPITAL ENCOUNTER (INPATIENT)
Facility: HOSPITAL | Age: 66
LOS: 3 days | Discharge: HOME HEALTH | DRG: 190 | End: 2021-08-13
Attending: EMERGENCY MEDICINE | Admitting: HOSPITALIST
Payer: MEDICARE

## 2021-08-09 DIAGNOSIS — R09.02 HYPOXIA: Primary | ICD-10-CM

## 2021-08-09 DIAGNOSIS — J44.1 COPD EXACERBATION (HCC): ICD-10-CM

## 2021-08-09 LAB
ADENOVIRUS PCR:: NOT DETECTED
ALBUMIN SERPL-MCNC: 4 G/DL (ref 3.4–5)
ALBUMIN/GLOB SERPL: 1.2 {RATIO} (ref 1–2)
ALP LIVER SERPL-CCNC: 70 U/L
ALT SERPL-CCNC: 24 U/L
ANION GAP SERPL CALC-SCNC: 10 MMOL/L (ref 0–18)
APTT PPP: 24.6 SECONDS (ref 25.4–36.1)
AST SERPL-CCNC: 15 U/L (ref 15–37)
B PARAPERT DNA SPEC QL NAA+PROBE: NOT DETECTED
B PERT DNA SPEC QL NAA+PROBE: NOT DETECTED
BASOPHILS # BLD AUTO: 0.05 X10(3) UL (ref 0–0.2)
BASOPHILS NFR BLD AUTO: 0.6 %
BILIRUB SERPL-MCNC: 0.2 MG/DL (ref 0.1–2)
BUN BLD-MCNC: 19 MG/DL (ref 7–18)
C PNEUM DNA SPEC QL NAA+PROBE: NOT DETECTED
CALCIUM BLD-MCNC: 9.1 MG/DL (ref 8.5–10.1)
CHLORIDE SERPL-SCNC: 107 MMOL/L (ref 98–112)
CO2 SERPL-SCNC: 27 MMOL/L (ref 21–32)
CORONAVIRUS 229E PCR:: NOT DETECTED
CORONAVIRUS HKU1 PCR:: NOT DETECTED
CORONAVIRUS NL63 PCR:: NOT DETECTED
CORONAVIRUS OC43 PCR:: NOT DETECTED
CREAT BLD-MCNC: 0.96 MG/DL
CRP SERPL-MCNC: <0.29 MG/DL (ref ?–0.3)
D-DIMER: <0.27 UG/ML FEU (ref ?–0.63)
EOSINOPHIL # BLD AUTO: 0.58 X10(3) UL (ref 0–0.7)
EOSINOPHIL NFR BLD AUTO: 7.2 %
ERYTHROCYTE [DISTWIDTH] IN BLOOD BY AUTOMATED COUNT: 14.3 %
FLUAV RNA SPEC QL NAA+PROBE: NOT DETECTED
FLUBV RNA SPEC QL NAA+PROBE: NOT DETECTED
GLOBULIN PLAS-MCNC: 3.4 G/DL (ref 2.8–4.4)
GLUCOSE BLD-MCNC: 132 MG/DL (ref 70–99)
HCT VFR BLD AUTO: 43.6 %
HGB BLD-MCNC: 14.8 G/DL
IMM GRANULOCYTES # BLD AUTO: 0.11 X10(3) UL (ref 0–1)
IMM GRANULOCYTES NFR BLD: 1.4 %
INR BLD: 0.85 (ref 0.89–1.11)
LYMPHOCYTES # BLD AUTO: 2.81 X10(3) UL (ref 1–4)
LYMPHOCYTES NFR BLD AUTO: 34.7 %
M PROTEIN MFR SERPL ELPH: 7.4 G/DL (ref 6.4–8.2)
MCH RBC QN AUTO: 30.6 PG (ref 26–34)
MCHC RBC AUTO-ENTMCNC: 33.9 G/DL (ref 31–37)
MCV RBC AUTO: 90.3 FL
METAPNEUMOVIRUS PCR:: NOT DETECTED
MONOCYTES # BLD AUTO: 0.83 X10(3) UL (ref 0.1–1)
MONOCYTES NFR BLD AUTO: 10.2 %
MYCOPLASMA PNEUMONIA PCR:: NOT DETECTED
NEUTROPHILS # BLD AUTO: 3.72 X10 (3) UL (ref 1.5–7.7)
NEUTROPHILS # BLD AUTO: 3.72 X10(3) UL (ref 1.5–7.7)
NEUTROPHILS NFR BLD AUTO: 45.9 %
NT-PROBNP SERPL-MCNC: 19 PG/ML (ref ?–125)
OSMOLALITY SERPL CALC.SUM OF ELEC: 302 MOSM/KG (ref 275–295)
PARAINFLUENZA 1 PCR:: NOT DETECTED
PARAINFLUENZA 2 PCR:: NOT DETECTED
PARAINFLUENZA 3 PCR:: NOT DETECTED
PARAINFLUENZA 4 PCR:: NOT DETECTED
PLATELET # BLD AUTO: 281 10(3)UL (ref 150–450)
POTASSIUM SERPL-SCNC: 3.5 MMOL/L (ref 3.5–5.1)
PROCALCITONIN SERPL-MCNC: <0.05 NG/ML (ref ?–0.16)
PSA SERPL DL<=0.01 NG/ML-MCNC: 11.8 SECONDS (ref 12.2–14.5)
RBC # BLD AUTO: 4.83 X10(6)UL
RHINOVIRUS/ENTERO PCR:: NOT DETECTED
RSV RNA SPEC QL NAA+PROBE: NOT DETECTED
SARS-COV-2 RNA NPH QL NAA+NON-PROBE: NOT DETECTED
SARS-COV-2 RNA RESP QL NAA+PROBE: NOT DETECTED
SODIUM SERPL-SCNC: 144 MMOL/L (ref 136–145)
TROPONIN I SERPL-MCNC: <0.045 NG/ML (ref ?–0.04)
WBC # BLD AUTO: 8.1 X10(3) UL (ref 4–11)

## 2021-08-09 PROCEDURE — 71045 X-RAY EXAM CHEST 1 VIEW: CPT | Performed by: EMERGENCY MEDICINE

## 2021-08-09 PROCEDURE — 99223 1ST HOSP IP/OBS HIGH 75: CPT | Performed by: HOSPITALIST

## 2021-08-09 RX ORDER — METHYLPREDNISOLONE SODIUM SUCCINATE 125 MG/2ML
125 INJECTION, POWDER, LYOPHILIZED, FOR SOLUTION INTRAMUSCULAR; INTRAVENOUS ONCE
Status: COMPLETED | OUTPATIENT
Start: 2021-08-09 | End: 2021-08-09

## 2021-08-09 RX ORDER — MAGNESIUM SULFATE 1 G/100ML
1 INJECTION INTRAVENOUS ONCE
Status: COMPLETED | OUTPATIENT
Start: 2021-08-09 | End: 2021-08-10

## 2021-08-09 RX ORDER — KETOROLAC TROMETHAMINE 30 MG/ML
15 INJECTION, SOLUTION INTRAMUSCULAR; INTRAVENOUS ONCE
Status: COMPLETED | OUTPATIENT
Start: 2021-08-09 | End: 2021-08-09

## 2021-08-10 LAB
ALBUMIN SERPL-MCNC: 3.7 G/DL (ref 3.4–5)
ALBUMIN/GLOB SERPL: 1.1 {RATIO} (ref 1–2)
ALP LIVER SERPL-CCNC: 62 U/L
ALT SERPL-CCNC: 22 U/L
ANION GAP SERPL CALC-SCNC: 8 MMOL/L (ref 0–18)
AST SERPL-CCNC: 12 U/L (ref 15–37)
ATRIAL RATE: 101 BPM
BASOPHILS # BLD AUTO: 0.01 X10(3) UL (ref 0–0.2)
BASOPHILS NFR BLD AUTO: 0.2 %
BILIRUB SERPL-MCNC: 0.3 MG/DL (ref 0.1–2)
BUN BLD-MCNC: 21 MG/DL (ref 7–18)
CALCIUM BLD-MCNC: 9 MG/DL (ref 8.5–10.1)
CHLORIDE SERPL-SCNC: 108 MMOL/L (ref 98–112)
CO2 SERPL-SCNC: 28 MMOL/L (ref 21–32)
CREAT BLD-MCNC: 1.14 MG/DL
CRP SERPL-MCNC: <0.29 MG/DL (ref ?–0.3)
EOSINOPHIL # BLD AUTO: 0 X10(3) UL (ref 0–0.7)
EOSINOPHIL NFR BLD AUTO: 0 %
ERYTHROCYTE [DISTWIDTH] IN BLOOD BY AUTOMATED COUNT: 14.5 %
GLOBULIN PLAS-MCNC: 3.4 G/DL (ref 2.8–4.4)
GLUCOSE BLD-MCNC: 202 MG/DL (ref 70–99)
HCT VFR BLD AUTO: 42.3 %
HGB BLD-MCNC: 13.9 G/DL
IMM GRANULOCYTES # BLD AUTO: 0.05 X10(3) UL (ref 0–1)
IMM GRANULOCYTES NFR BLD: 0.8 %
LYMPHOCYTES # BLD AUTO: 0.53 X10(3) UL (ref 1–4)
LYMPHOCYTES NFR BLD AUTO: 8.1 %
M PROTEIN MFR SERPL ELPH: 7.1 G/DL (ref 6.4–8.2)
MCH RBC QN AUTO: 30 PG (ref 26–34)
MCHC RBC AUTO-ENTMCNC: 32.9 G/DL (ref 31–37)
MCV RBC AUTO: 91.4 FL
MONOCYTES # BLD AUTO: 0.08 X10(3) UL (ref 0.1–1)
MONOCYTES NFR BLD AUTO: 1.2 %
NEUTROPHILS # BLD AUTO: 5.84 X10 (3) UL (ref 1.5–7.7)
NEUTROPHILS # BLD AUTO: 5.84 X10(3) UL (ref 1.5–7.7)
NEUTROPHILS NFR BLD AUTO: 89.7 %
OSMOLALITY SERPL CALC.SUM OF ELEC: 307 MOSM/KG (ref 275–295)
P AXIS: 87 DEGREES
P-R INTERVAL: 130 MS
PLATELET # BLD AUTO: 270 10(3)UL (ref 150–450)
POTASSIUM SERPL-SCNC: 3.8 MMOL/L (ref 3.5–5.1)
Q-T INTERVAL: 346 MS
QRS DURATION: 70 MS
QTC CALCULATION (BEZET): 448 MS
R AXIS: 74 DEGREES
RBC # BLD AUTO: 4.63 X10(6)UL
SARS-COV-2 RNA RESP QL NAA+PROBE: NOT DETECTED
SODIUM SERPL-SCNC: 144 MMOL/L (ref 136–145)
T AXIS: 77 DEGREES
VENTRICULAR RATE: 101 BPM
WBC # BLD AUTO: 6.5 X10(3) UL (ref 4–11)

## 2021-08-10 PROCEDURE — 99291 CRITICAL CARE FIRST HOUR: CPT | Performed by: NURSE PRACTITIONER

## 2021-08-10 PROCEDURE — 99232 SBSQ HOSP IP/OBS MODERATE 35: CPT | Performed by: HOSPITALIST

## 2021-08-10 RX ORDER — FOLIC ACID 1 MG/1
1 TABLET ORAL DAILY
Status: DISCONTINUED | OUTPATIENT
Start: 2021-08-10 | End: 2021-08-13

## 2021-08-10 RX ORDER — MELATONIN
100 DAILY
Status: DISCONTINUED | OUTPATIENT
Start: 2021-08-10 | End: 2021-08-13

## 2021-08-10 RX ORDER — IPRATROPIUM BROMIDE AND ALBUTEROL SULFATE 2.5; .5 MG/3ML; MG/3ML
3 SOLUTION RESPIRATORY (INHALATION)
Status: DISCONTINUED | OUTPATIENT
Start: 2021-08-10 | End: 2021-08-12

## 2021-08-10 RX ORDER — NICOTINE 21 MG/24HR
1 PATCH, TRANSDERMAL 24 HOURS TRANSDERMAL DAILY
Status: DISCONTINUED | OUTPATIENT
Start: 2021-08-10 | End: 2021-08-10

## 2021-08-10 RX ORDER — ALBUTEROL SULFATE 90 UG/1
8 AEROSOL, METERED RESPIRATORY (INHALATION) EVERY 4 HOURS PRN
Status: DISCONTINUED | OUTPATIENT
Start: 2021-08-10 | End: 2021-08-13

## 2021-08-10 RX ORDER — GUAIFENESIN 600 MG
600 TABLET, EXTENDED RELEASE 12 HR ORAL 2 TIMES DAILY
Status: DISCONTINUED | OUTPATIENT
Start: 2021-08-10 | End: 2021-08-13

## 2021-08-10 RX ORDER — BISACODYL 10 MG
10 SUPPOSITORY, RECTAL RECTAL
Status: DISCONTINUED | OUTPATIENT
Start: 2021-08-10 | End: 2021-08-13

## 2021-08-10 RX ORDER — ALBUTEROL SULFATE 90 UG/1
8 AEROSOL, METERED RESPIRATORY (INHALATION)
Status: DISCONTINUED | OUTPATIENT
Start: 2021-08-10 | End: 2021-08-10

## 2021-08-10 RX ORDER — SODIUM PHOSPHATE, DIBASIC AND SODIUM PHOSPHATE, MONOBASIC 7; 19 G/133ML; G/133ML
1 ENEMA RECTAL ONCE AS NEEDED
Status: DISCONTINUED | OUTPATIENT
Start: 2021-08-10 | End: 2021-08-13

## 2021-08-10 RX ORDER — CODEINE PHOSPHATE AND GUAIFENESIN 10; 100 MG/5ML; MG/5ML
5 SOLUTION ORAL EVERY 4 HOURS PRN
Status: DISCONTINUED | OUTPATIENT
Start: 2021-08-10 | End: 2021-08-13

## 2021-08-10 RX ORDER — ATORVASTATIN CALCIUM 10 MG/1
10 TABLET, FILM COATED ORAL NIGHTLY
Status: DISCONTINUED | OUTPATIENT
Start: 2021-08-10 | End: 2021-08-13

## 2021-08-10 RX ORDER — POTASSIUM CHLORIDE 20 MEQ/1
40 TABLET, EXTENDED RELEASE ORAL ONCE
Status: COMPLETED | OUTPATIENT
Start: 2021-08-10 | End: 2021-08-10

## 2021-08-10 RX ORDER — HYDROCODONE BITARTRATE AND ACETAMINOPHEN 10; 325 MG/1; MG/1
1 TABLET ORAL EVERY 8 HOURS PRN
Status: DISCONTINUED | OUTPATIENT
Start: 2021-08-10 | End: 2021-08-13

## 2021-08-10 RX ORDER — SODIUM CHLORIDE 9 MG/ML
INJECTION, SOLUTION INTRAVENOUS CONTINUOUS
Status: DISCONTINUED | OUTPATIENT
Start: 2021-08-10 | End: 2021-08-10

## 2021-08-10 RX ORDER — PROCHLORPERAZINE EDISYLATE 5 MG/ML
5 INJECTION INTRAMUSCULAR; INTRAVENOUS EVERY 8 HOURS PRN
Status: DISCONTINUED | OUTPATIENT
Start: 2021-08-10 | End: 2021-08-13

## 2021-08-10 RX ORDER — ASPIRIN 81 MG/1
81 TABLET, CHEWABLE ORAL DAILY
Status: DISCONTINUED | OUTPATIENT
Start: 2021-08-10 | End: 2021-08-13

## 2021-08-10 RX ORDER — IPRATROPIUM BROMIDE AND ALBUTEROL SULFATE 2.5; .5 MG/3ML; MG/3ML
3 SOLUTION RESPIRATORY (INHALATION)
Status: DISCONTINUED | OUTPATIENT
Start: 2021-08-10 | End: 2021-08-10

## 2021-08-10 RX ORDER — NICOTINE 21 MG/24HR
1 PATCH, TRANSDERMAL 24 HOURS TRANSDERMAL DAILY
Status: DISCONTINUED | OUTPATIENT
Start: 2021-08-11 | End: 2021-08-13

## 2021-08-10 RX ORDER — ONDANSETRON 2 MG/ML
4 INJECTION INTRAMUSCULAR; INTRAVENOUS EVERY 6 HOURS PRN
Status: DISCONTINUED | OUTPATIENT
Start: 2021-08-10 | End: 2021-08-13

## 2021-08-10 RX ORDER — DOCUSATE SODIUM 100 MG/1
100 CAPSULE, LIQUID FILLED ORAL 2 TIMES DAILY
Status: DISCONTINUED | OUTPATIENT
Start: 2021-08-10 | End: 2021-08-13

## 2021-08-10 RX ORDER — PANTOPRAZOLE SODIUM 40 MG/1
40 TABLET, DELAYED RELEASE ORAL
Status: DISCONTINUED | OUTPATIENT
Start: 2021-08-10 | End: 2021-08-13

## 2021-08-10 RX ORDER — BENZONATATE 100 MG/1
100 CAPSULE ORAL 3 TIMES DAILY PRN
Status: DISCONTINUED | OUTPATIENT
Start: 2021-08-10 | End: 2021-08-13

## 2021-08-10 RX ORDER — CLOPIDOGREL BISULFATE 75 MG/1
75 TABLET ORAL DAILY
Status: DISCONTINUED | OUTPATIENT
Start: 2021-08-10 | End: 2021-08-13

## 2021-08-10 RX ORDER — METHYLPREDNISOLONE SODIUM SUCCINATE 40 MG/ML
40 INJECTION, POWDER, LYOPHILIZED, FOR SOLUTION INTRAMUSCULAR; INTRAVENOUS EVERY 6 HOURS
Status: DISCONTINUED | OUTPATIENT
Start: 2021-08-10 | End: 2021-08-12

## 2021-08-10 RX ORDER — GABAPENTIN 300 MG/1
300 CAPSULE ORAL 3 TIMES DAILY
Status: DISCONTINUED | OUTPATIENT
Start: 2021-08-10 | End: 2021-08-13

## 2021-08-10 RX ORDER — POLYETHYLENE GLYCOL 3350 17 G/17G
17 POWDER, FOR SOLUTION ORAL DAILY PRN
Status: DISCONTINUED | OUTPATIENT
Start: 2021-08-10 | End: 2021-08-13

## 2021-08-10 RX ORDER — ENOXAPARIN SODIUM 100 MG/ML
40 INJECTION SUBCUTANEOUS DAILY
Status: DISCONTINUED | OUTPATIENT
Start: 2021-08-10 | End: 2021-08-13

## 2021-08-10 RX ORDER — ACETAMINOPHEN 325 MG/1
650 TABLET ORAL EVERY 6 HOURS PRN
Status: DISCONTINUED | OUTPATIENT
Start: 2021-08-10 | End: 2021-08-13

## 2021-08-10 NOTE — PLAN OF CARE
Pt to room 474 from ED. Pt alert and oriented x4. Maintaining adequate O2 saturations on NC. Complaint of pain treated PRN. Pt resting comfortably in bed. Call light within reach. Will continue to monitor closely. Report given to oncoming RN.

## 2021-08-10 NOTE — CM/SW NOTE
CM reviewed chart. Patient from home, here with asthma-COPD overlap with acute exacerbation. On  IV steroids. Works at [employer] and is unvaccinated. Rapid COVID negative. RVP negative. COVID PCR pending. Smoker. CM will follow for needs at [place].     Social

## 2021-08-10 NOTE — CONSULTS
Pulmonary Consult     Assessment / Plan:  1. Asthma-COPD overlap with acute exacerbation   - IV steroids  - rapid COVID negative.  RVP negative   - COVID PCR pending   - holding duonebs while awaiting COVID PCR  - Breo/incruse   - BD per protocol   - wean o Pain., Disp: 10 tablet, Rfl: 0, Past Month at Unknown time  aspirin 81 MG Oral Chew Tab, Chew 1 tablet (81 mg total) by mouth daily. , Disp: 30 tablet, Rfl: 0, 8/9/2021 at 0930  atorvastatin 10 MG Oral Tab, Take 1 tablet (10 mg total) by mouth nightly., Dis every morning before breakfast., Disp: , Rfl: , 8/9/2021 at Unknown time  Thiamine HCl (B-1) 100 MG Oral Tab, Take 100 mg by mouth daily. , Disp: , Rfl:   Multiple Vitamin (THERA/BETA-CAROTENE) Oral Tab, Take 1 tablet by mouth daily. , Disp: , Rfl:   Diaz Perez as needed for Wheezing., Disp: , Rfl:   Pantoprazole Sodium 40 MG Oral Tab EC, Take 40 mg by mouth every morning before breakfast., Disp: , Rfl:          Influenza Vaccines      HIVES, SWELLING  Onion                   HIVES, SWELLING  Penicillins

## 2021-08-10 NOTE — ED PROVIDER NOTES
Patient Seen in: [facility] Emergency Department      History   Patient presents with:  Difficulty Breathing    Stated Complaint: sob    HPI/Subjective:   HPI    Severe shortness of breath and COPD  On steroids last week  Possible sapsms around hear complaint: sob  Other systems are as noted in HPI. Constitutional and vital signs reviewed. All other systems reviewed and negative except as noted above.     Physical Exam     ED Triage Vitals [08/09/21 1846]   /83   Pulse 102   Resp 18   Temp Normal range of motion and neck supple. Lymphadenopathy:      Cervical: No cervical adenopathy. Skin:     General: Skin is warm and dry. Coloration: Skin is not pale. Findings: No erythema or rash.    Neurological:      Mental Status: She is a Use Authorization. The authorized Fact Sheet for Healthcare Providers for this assay is available upon request from the laboratory. SARS and MERS coronaviruses are not tested on this assay. CBC WITH DIFFERENTIAL WITH PLATELET    Narrative:      The Covid and it works at [employer] was just put her at high risk and would believe. Discussed this with patient. Solu-Medrol IV started as well as magnesium given the amount of distress I found her in his after the room.          MDM      31-year-old who sm

## 2021-08-10 NOTE — ED QUICK NOTES
Labored respirations noted, tripod, auditory wheezing noted. speaking in 2-3 word sentences. 81% RA, placed patient on 5L NC. Now 98%.

## 2021-08-10 NOTE — ED QUICK NOTES
Placed patient in negative pressure room for one hour long treatment. Patient states that she is unvaccinated but gets regular covid tests at work and is negative. covid swabs sent at this time.

## 2021-08-10 NOTE — PROGRESS NOTES
[facility]  Progress Note     Stacy Vasquez Patient Status:  Inpatient    1955 MRN [id number]   [facility] 4SW-A Attending Moose Cordon MD   Hosp Day # 0 PCP PHYSICIAN NONSTAFF     Chief Complaint: SOB    S: Patient state 08/03/2021       Pro-Calcitonin  Recent Labs   Lab 08/09/21 2103   PCT <0.05       Cardiac  Recent Labs   Lab 08/09/21 2103   TROP <0.045   PBNP 19       Creatinine Kinase  No results for input(s): CK in the last 168 hours.     Inflammatory Markers  Recen - Initial Certification    Patient will require inpatient services that will reasonably be expected to span two midnight's based on the clinical documentation in H+P.    Based on patients current state of illness, I anticipate that, after discharge, patient

## 2021-08-10 NOTE — H&P
[facility]  History and Physical     Reji Jo Patient Status:  Emergency    1955 MRN [id number]   Location [facility] Attending Alonso Browning MD   Hosp Day # 0 PCP PHYSICIAN NONSTAFF     Chief Complaint: drug use. Drug: \"Crack\" cocaine.     Family History:   Family History   Problem Relation Age of Onset   • Cancer Father    • Cancer Mother    • Other (Other) Mother         Allergies:   Influenza Vaccines      HIVES, SWELLING  Onion                   HIVE hydrochlorothiazide 25 MG Oral Tab, Take 25 mg by mouth daily. , Disp: , Rfl:   lisinopril 40 MG Oral Tab, Take 40 mg by mouth daily. , Disp: , Rfl:   VENTOLIN  (90 Base) MCG/ACT Inhalation Aero Soln, Inhale 2 puffs into the lungs every 4 (four) prasanth Date    COVID19 Not Detected 08/09/2021    COVID19 Not Detected 08/03/2021    COVID19 Not Detected 05/26/2021       Pro-Calcitonin  Recent Labs   Lab 08/09/21 2103   PCT <0.05       Cardiac  Recent Labs   Lab 08/03/21  0249 08/09/21 2103   TROP <0.045 <0

## 2021-08-10 NOTE — PLAN OF CARE
Patient continuously monitored on telemetry. Vitals recorded every hour. Medications given per MAR. Patient updated with Plan of Care and education given as needed. Please see EPIC DocFlowsheet for further assessment information.     Gee Borjas RN  I

## 2021-08-10 NOTE — ED QUICK NOTES
Patient difficult IV stick. Unable to obtain access. Patient unable to tolerate, states that she is afraid of needles. MD called to bedside.

## 2021-08-10 NOTE — PROGRESS NOTES
ICU  Critical Care APRN Progress Note    NAME: Adore Pan - ROOM: 215/759-V - MRN: [id number] - Age: 63year old - :1955    History Of Present Illness:  Adore Pan is a 63year old female with PMHx significant for COPD, tobacco use, HT Cancer Mother    • Other (Other) Mother          Review of Systems:   A comprehensive 10 point review of systems was completed. Pertinent positives and negatives noted in the HPI.     OBJECTIVE  Vitals:  /80 (BP Location: Right arm)   Pulse 88   Temp CO2 27.0 08/09/2021     08/09/2021    CA 9.1 08/09/2021    ALB 4.0 08/09/2021    ALKPHO 70 08/09/2021    BILT 0.2 08/09/2021    TP 7.4 08/09/2021    AST 15 08/09/2021    ALT 24 08/09/2021    PTT 24.6 08/09/2021    INR 0.85 08/09/2021       Assessmen

## 2021-08-11 LAB
ALBUMIN SERPL-MCNC: 3.7 G/DL (ref 3.4–5)
ALBUMIN/GLOB SERPL: 1.2 {RATIO} (ref 1–2)
ALP LIVER SERPL-CCNC: 60 U/L
ALT SERPL-CCNC: 23 U/L
ANION GAP SERPL CALC-SCNC: 8 MMOL/L (ref 0–18)
AST SERPL-CCNC: 12 U/L (ref 15–37)
BASOPHILS # BLD AUTO: 0.05 X10(3) UL (ref 0–0.2)
BASOPHILS NFR BLD AUTO: 0.2 %
BILIRUB SERPL-MCNC: 0.4 MG/DL (ref 0.1–2)
BUN BLD-MCNC: 20 MG/DL (ref 7–18)
CALCIUM BLD-MCNC: 8.8 MG/DL (ref 8.5–10.1)
CHLORIDE SERPL-SCNC: 106 MMOL/L (ref 98–112)
CO2 SERPL-SCNC: 27 MMOL/L (ref 21–32)
CREAT BLD-MCNC: 0.82 MG/DL
EOSINOPHIL # BLD AUTO: 0 X10(3) UL (ref 0–0.7)
EOSINOPHIL NFR BLD AUTO: 0 %
ERYTHROCYTE [DISTWIDTH] IN BLOOD BY AUTOMATED COUNT: 14.4 %
GLOBULIN PLAS-MCNC: 3.2 G/DL (ref 2.8–4.4)
GLUCOSE BLD-MCNC: 113 MG/DL (ref 70–99)
HAV IGM SER QL: 2.5 MG/DL (ref 1.6–2.6)
HCT VFR BLD AUTO: 40.3 %
HGB BLD-MCNC: 13.6 G/DL
IMM GRANULOCYTES # BLD AUTO: 0.24 X10(3) UL (ref 0–1)
IMM GRANULOCYTES NFR BLD: 1.1 %
LYMPHOCYTES # BLD AUTO: 1.21 X10(3) UL (ref 1–4)
LYMPHOCYTES NFR BLD AUTO: 5.6 %
M PROTEIN MFR SERPL ELPH: 6.9 G/DL (ref 6.4–8.2)
MCH RBC QN AUTO: 30.3 PG (ref 26–34)
MCHC RBC AUTO-ENTMCNC: 33.7 G/DL (ref 31–37)
MCV RBC AUTO: 89.8 FL
MONOCYTES # BLD AUTO: 1.49 X10(3) UL (ref 0.1–1)
MONOCYTES NFR BLD AUTO: 6.9 %
NEUTROPHILS # BLD AUTO: 18.56 X10 (3) UL (ref 1.5–7.7)
NEUTROPHILS # BLD AUTO: 18.56 X10(3) UL (ref 1.5–7.7)
NEUTROPHILS NFR BLD AUTO: 86.2 %
OSMOLALITY SERPL CALC.SUM OF ELEC: 295 MOSM/KG (ref 275–295)
PHOSPHATE SERPL-MCNC: 3.3 MG/DL (ref 2.5–4.9)
PLATELET # BLD AUTO: 266 10(3)UL (ref 150–450)
POTASSIUM SERPL-SCNC: 3.8 MMOL/L (ref 3.5–5.1)
RBC # BLD AUTO: 4.49 X10(6)UL
SODIUM SERPL-SCNC: 141 MMOL/L (ref 136–145)
WBC # BLD AUTO: 21.6 X10(3) UL (ref 4–11)

## 2021-08-11 PROCEDURE — 99232 SBSQ HOSP IP/OBS MODERATE 35: CPT | Performed by: HOSPITALIST

## 2021-08-11 RX ORDER — POTASSIUM CHLORIDE 20 MEQ/1
40 TABLET, EXTENDED RELEASE ORAL ONCE
Status: COMPLETED | OUTPATIENT
Start: 2021-08-11 | End: 2021-08-11

## 2021-08-11 NOTE — PLAN OF CARE
Assumed care of pt for the night shift. Pt alert and oriented x4. Maintaining adequate O2 saturations on RA. Productive cough present. Pain treated PRN see MAR. See flowhsheets for further assessment. Pt resting comfortably in bed. Call light within reach.

## 2021-08-11 NOTE — PROGRESS NOTES
[facility]  Progress Note     Celina Lis Patient Status:  Inpatient    1955 MRN [id number]   [facility] 4SW-A Attending Shayy Dubois MD   Hosp Day # 1 PCP PHYSICIAN NONSTAFF     Chief Complaint: SOB    S: Patient Aditi Ayad Results    COVID-19  Lab Results   Component Value Date    COVID19 Not Detected 08/09/2021    COVID19 Not Detected 08/09/2021    COVID19 Not Detected 08/09/2021       Pro-Calcitonin  Recent Labs   Lab 08/09/21 2103   PCT <0.05       Cardiac  Recent Labs to be discharge to: home    Plan of care discussed with pt and RN.     Chanel Lujan MD

## 2021-08-11 NOTE — PROGRESS NOTES
08/10/21 2053   Clinical Encounter Type   Visited With Health care provider  (R/O pt. MD advised  to come back tomorrow.  (8?11))   Routine Visit Introduction   Continue Visiting Yes

## 2021-08-11 NOTE — PLAN OF CARE
Assumed care of pt at 0730. Received pt resting in bed. Alert and oriented x4. Afebrile. Hemodynamically stable, NSR/ST per monitor. On room air, o2 saturations >95%. Strong productive cough.  Up to bedside commode with stand-by assist. Pain treated with [place]

## 2021-08-11 NOTE — PROGRESS NOTES
08/11/21 1628   Clinical Encounter Type   Visited With Patient   Routine Visit Follow-up   Continue Visiting No   Patient Spiritual Encounters   Spiritual Interventions  initiated relationship, providing active listening as pt. shared all of her

## 2021-08-11 NOTE — RESPIRATORY THERAPY NOTE
Received pt on RA saturating 100%. Pt BS diminished bilaterally. Intermittently productive cough of thick, white sputum. Continue present management.

## 2021-08-11 NOTE — PROGRESS NOTES
Critical Care Progress Note     Assessment / Plan:  1. Asthma-COPD overlap with acute exacerbation   - IV steroids  - rapid COVID and PCR negative. RVP negative   - duonebs per protocol   - Breo/incruse   - BD per protocol   2. Chest pain  - Trop negative.

## 2021-08-12 LAB
ALBUMIN SERPL-MCNC: 3.2 G/DL (ref 3.4–5)
ALBUMIN/GLOB SERPL: 1 {RATIO} (ref 1–2)
ALP LIVER SERPL-CCNC: 56 U/L
ALT SERPL-CCNC: 26 U/L
ANION GAP SERPL CALC-SCNC: 4 MMOL/L (ref 0–18)
AST SERPL-CCNC: 19 U/L (ref 15–37)
BASOPHILS # BLD AUTO: 0.03 X10(3) UL (ref 0–0.2)
BASOPHILS NFR BLD AUTO: 0.2 %
BILIRUB SERPL-MCNC: 0.3 MG/DL (ref 0.1–2)
BUN BLD-MCNC: 18 MG/DL (ref 7–18)
CALCIUM BLD-MCNC: 8.2 MG/DL (ref 8.5–10.1)
CHLORIDE SERPL-SCNC: 108 MMOL/L (ref 98–112)
CO2 SERPL-SCNC: 28 MMOL/L (ref 21–32)
CREAT BLD-MCNC: 0.86 MG/DL
EOSINOPHIL # BLD AUTO: 0 X10(3) UL (ref 0–0.7)
EOSINOPHIL NFR BLD AUTO: 0 %
ERYTHROCYTE [DISTWIDTH] IN BLOOD BY AUTOMATED COUNT: 14.6 %
GLOBULIN PLAS-MCNC: 3.2 G/DL (ref 2.8–4.4)
GLUCOSE BLD-MCNC: 132 MG/DL (ref 70–99)
HAV IGM SER QL: 2.6 MG/DL (ref 1.6–2.6)
HCT VFR BLD AUTO: 39.9 %
HGB BLD-MCNC: 12.8 G/DL
IMM GRANULOCYTES # BLD AUTO: 0.37 X10(3) UL (ref 0–1)
IMM GRANULOCYTES NFR BLD: 2.3 %
LYMPHOCYTES # BLD AUTO: 0.71 X10(3) UL (ref 1–4)
LYMPHOCYTES NFR BLD AUTO: 4.4 %
M PROTEIN MFR SERPL ELPH: 6.4 G/DL (ref 6.4–8.2)
MCH RBC QN AUTO: 29.4 PG (ref 26–34)
MCHC RBC AUTO-ENTMCNC: 32.1 G/DL (ref 31–37)
MCV RBC AUTO: 91.5 FL
MONOCYTES # BLD AUTO: 0.62 X10(3) UL (ref 0.1–1)
MONOCYTES NFR BLD AUTO: 3.8 %
NEUTROPHILS # BLD AUTO: 14.48 X10 (3) UL (ref 1.5–7.7)
NEUTROPHILS # BLD AUTO: 14.48 X10(3) UL (ref 1.5–7.7)
NEUTROPHILS NFR BLD AUTO: 89.3 %
OSMOLALITY SERPL CALC.SUM OF ELEC: 294 MOSM/KG (ref 275–295)
PHOSPHATE SERPL-MCNC: 2.9 MG/DL (ref 2.5–4.9)
PLATELET # BLD AUTO: 253 10(3)UL (ref 150–450)
POTASSIUM SERPL-SCNC: 4 MMOL/L (ref 3.5–5.1)
RBC # BLD AUTO: 4.36 X10(6)UL
SODIUM SERPL-SCNC: 140 MMOL/L (ref 136–145)
WBC # BLD AUTO: 16.2 X10(3) UL (ref 4–11)

## 2021-08-12 PROCEDURE — 99232 SBSQ HOSP IP/OBS MODERATE 35: CPT | Performed by: HOSPITALIST

## 2021-08-12 RX ORDER — IPRATROPIUM BROMIDE AND ALBUTEROL SULFATE 2.5; .5 MG/3ML; MG/3ML
3 SOLUTION RESPIRATORY (INHALATION)
Status: DISCONTINUED | OUTPATIENT
Start: 2021-08-13 | End: 2021-08-13

## 2021-08-12 RX ORDER — HYDROCHLOROTHIAZIDE 25 MG/1
25 TABLET ORAL DAILY
Status: DISCONTINUED | OUTPATIENT
Start: 2021-08-12 | End: 2021-08-13

## 2021-08-12 RX ORDER — PREDNISONE 20 MG/1
40 TABLET ORAL
Status: DISCONTINUED | OUTPATIENT
Start: 2021-08-12 | End: 2021-08-13

## 2021-08-12 NOTE — HOME CARE LIAISON
Patient is pending with [facility] for RN,PT,OT, and ST services. Patient will need home health order entered on or before date of discharge if returning home with home health.

## 2021-08-12 NOTE — PLAN OF CARE
Patient received from ICU. Patient with non-productive cough, respirations non-labored, O2 sat 100%on room air. Tele shows Sinus Tachycardia, heart rate 110's @ rest, 120's-140's when having coughing spells. Patient afebrile. Robitussin AC given as needed.

## 2021-08-12 NOTE — PROGRESS NOTES
[facility]  Progress Note     Keyana Geronimo Patient Status:  Inpatient    1955 MRN [id number]   [facility] 4NW-A Attending Christina Gonzalez MD   Hosp Day # 2 PCP PHYSICIAN NONSTAFF     Chief Complaint: sob wheezing    S: Sri Henle COVID-19 Lab Results    COVID-19  Lab Results   Component Value Date    COVID19 Not Detected 08/09/2021    COVID19 Not Detected 08/09/2021    COVID19 Not Detected 08/09/2021       Pro-Calcitonin  Recent Labs   Lab 08/09/21 2103   PCT <0.05       Cardi patient be referred to TCC on discharge?: TBD  Estimated date of discharge: TBD  Discharge is dependent on: progress  At this point Ms. Warden Robbins is expected to be discharge to: home     Plan of care discussed with pt and RN.     Luz Azar MD

## 2021-08-12 NOTE — RESPIRATORY THERAPY NOTE
Patient / Family Education Provided:      *  Medication / Inhaler Use    *   Breathing Techniques    *   Activity / Exercise    *   Pulmonary Rehab Benefits    *   Care Guidelines    *   Current Goals       COPD/SMOKING CESSATION EDUCATION     Watched educ

## 2021-08-12 NOTE — PROGRESS NOTES
Critical Care Progress Note     Assessment / Plan:  1. Asthma-COPD overlap with acute exacerbation   - transition to PO steroids today   - rapid COVID and PCR negative. RVP negative   - duonebs per protocol   - Breo/incruse   - BD per protocol   2.  Chest p

## 2021-08-12 NOTE — PLAN OF CARE
Pt Aox4. Tolerating RA. Intermittent cough, very uncomfortable for patient/ complaining of back pain d/t cough. Prn norco, robitussin, and tessalon with good relief. BP and HR elevated. Pt's home BP meds on hold while she was admitted/ in ICU.  HR climbing

## 2021-08-12 NOTE — PLAN OF CARE
Problem: RESPIRATORY - ADULT  Goal: Achieves optimal ventilation and oxygenation  Description: INTERVENTIONS:  - Assess for changes in respiratory status  - Assess for changes in mentation and behavior  - Position to facilitate oxygenation and minimize r signs and symptoms of bleeding or hemorrhage  - Monitor labs and vital signs for trends  - Administer supportive blood products/factors, fluids and medications as ordered and appropriate  - Administer supportive blood products/factors as ordered and approp

## 2021-08-13 VITALS
TEMPERATURE: 98 F | BODY MASS INDEX: 24.3 KG/M2 | DIASTOLIC BLOOD PRESSURE: 101 MMHG | WEIGHT: 137.13 LBS | HEART RATE: 106 BPM | OXYGEN SATURATION: 96 % | RESPIRATION RATE: 20 BRPM | SYSTOLIC BLOOD PRESSURE: 148 MMHG | HEIGHT: 63 IN

## 2021-08-13 LAB
ALBUMIN SERPL-MCNC: 3.2 G/DL (ref 3.4–5)
ALBUMIN/GLOB SERPL: 1.1 {RATIO} (ref 1–2)
ALP LIVER SERPL-CCNC: 54 U/L
ALT SERPL-CCNC: 36 U/L
ANION GAP SERPL CALC-SCNC: 4 MMOL/L (ref 0–18)
AST SERPL-CCNC: 22 U/L (ref 15–37)
ATRIAL RATE: 102 BPM
BASOPHILS # BLD: 0 X10(3) UL (ref 0–0.2)
BASOPHILS NFR BLD: 0 %
BILIRUB SERPL-MCNC: 0.3 MG/DL (ref 0.1–2)
BUN BLD-MCNC: 18 MG/DL (ref 7–18)
CALCIUM BLD-MCNC: 8.4 MG/DL (ref 8.5–10.1)
CHLORIDE SERPL-SCNC: 103 MMOL/L (ref 98–112)
CO2 SERPL-SCNC: 31 MMOL/L (ref 21–32)
CREAT BLD-MCNC: 0.71 MG/DL
EOSINOPHIL # BLD: 0 X10(3) UL (ref 0–0.7)
EOSINOPHIL NFR BLD: 0 %
ERYTHROCYTE [DISTWIDTH] IN BLOOD BY AUTOMATED COUNT: 14.3 %
GLOBULIN PLAS-MCNC: 3 G/DL (ref 2.8–4.4)
GLUCOSE BLD-MCNC: 84 MG/DL (ref 70–99)
HAV IGM SER QL: 2.6 MG/DL (ref 1.6–2.6)
HCT VFR BLD AUTO: 40.6 %
HGB BLD-MCNC: 13 G/DL
LYMPHOCYTES NFR BLD: 30 %
LYMPHOCYTES NFR BLD: 4.47 X10(3) UL (ref 1–4)
M PROTEIN MFR SERPL ELPH: 6.2 G/DL (ref 6.4–8.2)
MCH RBC QN AUTO: 29.3 PG (ref 26–34)
MCHC RBC AUTO-ENTMCNC: 32 G/DL (ref 31–37)
MCV RBC AUTO: 91.6 FL
METAMYELOCYTES # BLD: 0.15 X10(3) UL
METAMYELOCYTES NFR BLD: 1 %
MONOCYTES # BLD: 1.34 X10(3) UL (ref 0.1–1)
MONOCYTES NFR BLD: 9 %
MORPHOLOGY: NORMAL
NEUTROPHILS # BLD AUTO: 9.21 X10 (3) UL (ref 1.5–7.7)
NEUTROPHILS NFR BLD: 60 %
NEUTS HYPERSEG # BLD: 8.94 X10(3) UL (ref 1.5–7.7)
OSMOLALITY SERPL CALC.SUM OF ELEC: 287 MOSM/KG (ref 275–295)
P AXIS: 72 DEGREES
P-R INTERVAL: 132 MS
PHOSPHATE SERPL-MCNC: 3.2 MG/DL (ref 2.5–4.9)
PLATELET # BLD AUTO: 285 10(3)UL (ref 150–450)
PLATELET MORPHOLOGY: NORMAL
POTASSIUM SERPL-SCNC: 3.7 MMOL/L (ref 3.5–5.1)
Q-T INTERVAL: 322 MS
QRS DURATION: 78 MS
QTC CALCULATION (BEZET): 419 MS
R AXIS: 45 DEGREES
RBC # BLD AUTO: 4.43 X10(6)UL
SODIUM SERPL-SCNC: 138 MMOL/L (ref 136–145)
T AXIS: 61 DEGREES
TOTAL CELLS COUNTED: 100
VENTRICULAR RATE: 102 BPM
WBC # BLD AUTO: 14.9 X10(3) UL (ref 4–11)

## 2021-08-13 PROCEDURE — 99238 HOSP IP/OBS DSCHRG MGMT 30/<: CPT | Performed by: HOSPITALIST

## 2021-08-13 RX ORDER — LISINOPRIL 40 MG/1
40 TABLET ORAL DAILY
Status: DISCONTINUED | OUTPATIENT
Start: 2021-08-13 | End: 2021-08-13

## 2021-08-13 RX ORDER — MORPHINE SULFATE 2 MG/ML
INJECTION, SOLUTION INTRAMUSCULAR; INTRAVENOUS
Status: DISPENSED
Start: 2021-08-13 | End: 2021-08-13

## 2021-08-13 RX ORDER — MORPHINE SULFATE 2 MG/ML
2 INJECTION, SOLUTION INTRAMUSCULAR; INTRAVENOUS ONCE
Status: COMPLETED | OUTPATIENT
Start: 2021-08-13 | End: 2021-08-13

## 2021-08-13 RX ORDER — PREDNISONE 10 MG/1
TABLET ORAL
Qty: 30 TABLET | Refills: 0 | Status: SHIPPED | OUTPATIENT
Start: 2021-08-13 | End: 2021-12-21

## 2021-08-13 RX ORDER — FAMOTIDINE 20 MG/1
20 TABLET ORAL 2 TIMES DAILY PRN
Status: DISCONTINUED | OUTPATIENT
Start: 2021-08-13 | End: 2021-08-13

## 2021-08-13 RX ORDER — POTASSIUM CHLORIDE 20 MEQ/1
40 TABLET, EXTENDED RELEASE ORAL ONCE
Status: COMPLETED | OUTPATIENT
Start: 2021-08-13 | End: 2021-08-13

## 2021-08-13 RX ORDER — AMLODIPINE BESYLATE 5 MG/1
10 TABLET ORAL DAILY
Status: DISCONTINUED | OUTPATIENT
Start: 2021-08-13 | End: 2021-08-13

## 2021-08-13 NOTE — PROGRESS NOTES
[facility]  Progress Note     Stephens Hodgkins Patient Status:  Inpatient    1955 MRN [id number]   [facility] 4NW-A Attending Clare Alexandra MD   Hosp Day # 3 PCP PHYSICIAN NONSTAFF     Chief Complaint: sob wheezing    S: Redia Lank Creatinine Clearance: 67.1 mL/min (based on SCr of 0.71 mg/dL).     Recent Labs   Lab 08/09/21 2103   PTP 11.8*   INR 0.85*            COVID-19 Lab Results    COVID-19  Lab Results   Component Value Date    COVID19 Not Detected 08/09/2021    COVID19 Not Lorren Hals from home and continue metoprolol/HCTZ  6. DL, statin  7. Bipolar d/o  1. home meds resumed  8. H/o ETOH abuse  1. Vitamins, encourage cessation.     Quality:  · DVT Prophylaxis: lovenox  · CODE status: Full  · Samson: no  · Central line: no  · If COVID test

## 2021-08-13 NOTE — PLAN OF CARE
Patient alert and oriented x4. Afebrile. Room air with intermittent coughing due to COPD Exacerbation. Scheduled Mucinex and PRN cough medication given with moderate relief. On Tele ST ( -110).  PRN Norco given for back pain due to coughing with relie

## 2021-08-13 NOTE — CM/SW NOTE
08/13/21 1100   Discharge disposition   Expected discharge disposition Home or [facility] Provider [facility]   Informed [facility] the pt will dc today.

## 2021-08-13 NOTE — PLAN OF CARE
NURSING DISCHARGE NOTE    Discharged Home via Wheelchair. Accompanied by Family member and Support staff  Belongings Taken by patient/family. Discharge instructions given to pt at bedside.  Verbalized understanding re: medication changes, prescripti

## 2021-08-13 NOTE — PROGRESS NOTES
[address] Patient Status:  Inpatient    1955 MRN [id number]   [facility] 4NW-A Attending Ginny Colbert MD   Hosp Day # 3 PCP PHYSICIAN NONSTAFF     SUBJECTIVE:overall feels better. Less coughing.  No CP     OBJ Oral, Q4H PRN  •  benzonatate (TESSALON) cap 100 mg, 100 mg, Oral, TID PRN  •  enoxaparin (LOVENOX) 40 MG/0.4ML injection 40 mg, 40 mg, Subcutaneous, Daily  •  acetaminophen (TYLENOL) tab 650 mg, 650 mg, Oral, Q6H PRN  •  docusate sodium (COLACE) cap 100 m Results   Component Value Date    PT 12.6 02/15/2012    INR 0.85 (L) 08/09/2021    INR 1.11 01/24/2018    INR 0.92 11/17/2016           Assessment / Plan:  1.  Asthma-COPD overlap with acute exacerbation - improving  - slow prednisone taper   - rapid COVID

## 2021-08-13 NOTE — PLAN OF CARE
Oxygen Walk results:      SPO2% on Room Air at Rest: 98 (08/13/21 1345)     At rest oxygen flow (liters per minute): 0 (08/13/21 1345)  SPO2% Ambulation on Room Air: 97 (08/13/21 1345)     Ambulation oxygen flow (liters per minute): 0 (08/13/21 1345).

## 2021-08-14 NOTE — DISCHARGE SUMMARY
[facility] [place] HOSPITAL[facility]  DISCHARGE SUMMARY     Sean Martini Patient Status:  Inpatient    1955 MRN [id number]   [facility] 4NW-A Attending No att. providers found   Hosp Day # 3 PCP PHYSICIAN NONSTAFF     Date of Admission: 2021  D halls and has h/o stroke w/ residual R deficit. SW resumed HHC/HHPT at dc. Pt discharged once cleared by pulm. She was instructed to f/u with pulm and PCP in 1 week.   TCC referral.     Lace+ Score: 74  59-90 High Risk  29-58 Medium Risk  0-28   Low Risk daily. Refills: 0     gabapentin 300 MG Caps  Commonly known as: NEURONTIN      Take 1 capsule (300 mg total) by mouth 3 (three) times daily.    Quantity: 90 capsule  Refills: 0     hydrochlorothiazide 25 MG Tabs  Commonly known as: HYDRODIURIL      Take [address]  [phone]  Schedule an appointment as soon as possible for a visit in 3 days      Shiv Butcher NP  [address]  [address] [phone]    In 1 week  Pulmonology follow-up    Appointments for Next 30 Days 8/13/20

## 2021-08-16 ENCOUNTER — PATIENT OUTREACH (OUTPATIENT)
Dept: CASE MANAGEMENT | Age: 66
End: 2021-08-16

## 2021-08-16 NOTE — PROGRESS NOTES
NCM attempted to contact the patient for HFU. No answer and no VM turned on. NCM will try again later.

## 2021-08-17 NOTE — PROGRESS NOTES
NCM s/w patient who states that she is busy right now and she could not find the phone number for the TCC to reschedule and that is why she did not call to tell that she was not coming. Pt did want to reschedule and DANIEL rescheduled for 8/19/2021.  She was a

## 2021-08-19 ENCOUNTER — TELEPHONE (OUTPATIENT)
Dept: INTERNAL MEDICINE CLINIC | Facility: CLINIC | Age: 66
End: 2021-08-19

## 2021-09-11 ENCOUNTER — APPOINTMENT (OUTPATIENT)
Dept: CT IMAGING | Facility: HOSPITAL | Age: 66
End: 2021-09-11
Attending: EMERGENCY MEDICINE
Payer: MEDICARE

## 2021-09-11 ENCOUNTER — APPOINTMENT (OUTPATIENT)
Dept: GENERAL RADIOLOGY | Facility: HOSPITAL | Age: 66
End: 2021-09-11
Payer: MEDICARE

## 2021-09-11 ENCOUNTER — HOSPITAL ENCOUNTER (EMERGENCY)
Facility: HOSPITAL | Age: 66
Discharge: HOME | End: 2021-09-12
Attending: EMERGENCY MEDICINE
Payer: MEDICARE

## 2021-09-11 DIAGNOSIS — R10.9 ABDOMINAL PAIN OF UNKNOWN ETIOLOGY: Primary | ICD-10-CM

## 2021-09-11 LAB
ALBUMIN SERPL-MCNC: 3.6 G/DL (ref 3.4–5)
ALBUMIN/GLOB SERPL: 1 {RATIO} (ref 1–2)
ALP LIVER SERPL-CCNC: 57 U/L
ALT SERPL-CCNC: 25 U/L
ANION GAP SERPL CALC-SCNC: 7 MMOL/L (ref 0–18)
AST SERPL-CCNC: 16 U/L (ref 15–37)
BASOPHILS # BLD AUTO: 0.03 X10(3) UL (ref 0–0.2)
BASOPHILS NFR BLD AUTO: 0.3 %
BILIRUB SERPL-MCNC: 0.3 MG/DL (ref 0.1–2)
BUN BLD-MCNC: 16 MG/DL (ref 7–18)
CALCIUM BLD-MCNC: 8.9 MG/DL (ref 8.5–10.1)
CHLORIDE SERPL-SCNC: 105 MMOL/L (ref 98–112)
CO2 SERPL-SCNC: 27 MMOL/L (ref 21–32)
CREAT BLD-MCNC: 0.92 MG/DL
EOSINOPHIL # BLD AUTO: 0.03 X10(3) UL (ref 0–0.7)
EOSINOPHIL NFR BLD AUTO: 0.3 %
ERYTHROCYTE [DISTWIDTH] IN BLOOD BY AUTOMATED COUNT: 15.4 %
ETHANOL SERPL-MCNC: <3 MG/DL (ref ?–3)
GLOBULIN PLAS-MCNC: 3.5 G/DL (ref 2.8–4.4)
GLUCOSE BLD-MCNC: 168 MG/DL (ref 70–99)
HCT VFR BLD AUTO: 42.4 %
HGB BLD-MCNC: 14 G/DL
IMM GRANULOCYTES # BLD AUTO: 0.12 X10(3) UL (ref 0–1)
IMM GRANULOCYTES NFR BLD: 1.3 %
LIPASE SERPL-CCNC: 57 U/L (ref 73–393)
LYMPHOCYTES # BLD AUTO: 1.25 X10(3) UL (ref 1–4)
LYMPHOCYTES NFR BLD AUTO: 13.7 %
M PROTEIN MFR SERPL ELPH: 7.1 G/DL (ref 6.4–8.2)
MCH RBC QN AUTO: 30.2 PG (ref 26–34)
MCHC RBC AUTO-ENTMCNC: 33 G/DL (ref 31–37)
MCV RBC AUTO: 91.6 FL
MONOCYTES # BLD AUTO: 0.57 X10(3) UL (ref 0.1–1)
MONOCYTES NFR BLD AUTO: 6.2 %
NEUTROPHILS # BLD AUTO: 7.13 X10 (3) UL (ref 1.5–7.7)
NEUTROPHILS # BLD AUTO: 7.13 X10(3) UL (ref 1.5–7.7)
NEUTROPHILS NFR BLD AUTO: 78.2 %
OSMOLALITY SERPL CALC.SUM OF ELEC: 293 MOSM/KG (ref 275–295)
PLATELET # BLD AUTO: 276 10(3)UL (ref 150–450)
POTASSIUM SERPL-SCNC: 4 MMOL/L (ref 3.5–5.1)
RBC # BLD AUTO: 4.63 X10(6)UL
SARS-COV-2 RNA RESP QL NAA+PROBE: NOT DETECTED
SODIUM SERPL-SCNC: 139 MMOL/L (ref 136–145)
TROPONIN I SERPL-MCNC: <0.045 NG/ML (ref ?–0.04)
WBC # BLD AUTO: 9.1 X10(3) UL (ref 4–11)

## 2021-09-11 PROCEDURE — 85025 COMPLETE CBC W/AUTO DIFF WBC: CPT

## 2021-09-11 PROCEDURE — 85025 COMPLETE CBC W/AUTO DIFF WBC: CPT | Performed by: EMERGENCY MEDICINE

## 2021-09-11 PROCEDURE — 96374 THER/PROPH/DIAG INJ IV PUSH: CPT

## 2021-09-11 PROCEDURE — 84484 ASSAY OF TROPONIN QUANT: CPT | Performed by: EMERGENCY MEDICINE

## 2021-09-11 PROCEDURE — 80053 COMPREHEN METABOLIC PANEL: CPT | Performed by: EMERGENCY MEDICINE

## 2021-09-11 PROCEDURE — 74177 CT ABD & PELVIS W/CONTRAST: CPT | Performed by: EMERGENCY MEDICINE

## 2021-09-11 PROCEDURE — 93010 ELECTROCARDIOGRAM REPORT: CPT

## 2021-09-11 PROCEDURE — 96376 TX/PRO/DX INJ SAME DRUG ADON: CPT

## 2021-09-11 PROCEDURE — 84484 ASSAY OF TROPONIN QUANT: CPT

## 2021-09-11 PROCEDURE — 96361 HYDRATE IV INFUSION ADD-ON: CPT

## 2021-09-11 PROCEDURE — 93005 ELECTROCARDIOGRAM TRACING: CPT

## 2021-09-11 PROCEDURE — 99285 EMERGENCY DEPT VISIT HI MDM: CPT

## 2021-09-11 PROCEDURE — 83690 ASSAY OF LIPASE: CPT | Performed by: EMERGENCY MEDICINE

## 2021-09-11 PROCEDURE — 71045 X-RAY EXAM CHEST 1 VIEW: CPT

## 2021-09-11 PROCEDURE — 94640 AIRWAY INHALATION TREATMENT: CPT

## 2021-09-11 PROCEDURE — 71275 CT ANGIOGRAPHY CHEST: CPT | Performed by: EMERGENCY MEDICINE

## 2021-09-11 PROCEDURE — 96375 TX/PRO/DX INJ NEW DRUG ADDON: CPT

## 2021-09-11 PROCEDURE — 82077 ASSAY SPEC XCP UR&BREATH IA: CPT | Performed by: EMERGENCY MEDICINE

## 2021-09-11 PROCEDURE — 80053 COMPREHEN METABOLIC PANEL: CPT

## 2021-09-11 RX ORDER — HYDROMORPHONE HYDROCHLORIDE 1 MG/ML
1 INJECTION, SOLUTION INTRAMUSCULAR; INTRAVENOUS; SUBCUTANEOUS ONCE
Status: COMPLETED | OUTPATIENT
Start: 2021-09-11 | End: 2021-09-11

## 2021-09-11 RX ORDER — ONDANSETRON 2 MG/ML
4 INJECTION INTRAMUSCULAR; INTRAVENOUS ONCE
Status: COMPLETED | OUTPATIENT
Start: 2021-09-11 | End: 2021-09-11

## 2021-09-11 RX ORDER — ALBUTEROL SULFATE 90 UG/1
8 AEROSOL, METERED RESPIRATORY (INHALATION) ONCE
Status: COMPLETED | OUTPATIENT
Start: 2021-09-11 | End: 2021-09-11

## 2021-09-11 RX ORDER — MORPHINE SULFATE 4 MG/ML
4 INJECTION, SOLUTION INTRAMUSCULAR; INTRAVENOUS ONCE
Status: COMPLETED | OUTPATIENT
Start: 2021-09-11 | End: 2021-09-11

## 2021-09-12 VITALS
HEART RATE: 92 BPM | OXYGEN SATURATION: 96 % | DIASTOLIC BLOOD PRESSURE: 102 MMHG | TEMPERATURE: 98 F | SYSTOLIC BLOOD PRESSURE: 138 MMHG | BODY MASS INDEX: 23.92 KG/M2 | RESPIRATION RATE: 11 BRPM | HEIGHT: 63 IN | WEIGHT: 135 LBS

## 2021-09-12 LAB
AMPHET UR QL SCN: NEGATIVE
BENZODIAZ UR QL SCN: NEGATIVE
BILIRUB UR QL STRIP.AUTO: NEGATIVE
CANNABINOIDS UR QL SCN: NEGATIVE
CLARITY UR REFRACT.AUTO: CLEAR
COCAINE UR QL: NEGATIVE
COLOR UR AUTO: YELLOW
CREAT UR-SCNC: 54.4 MG/DL
GLUCOSE UR STRIP.AUTO-MCNC: NEGATIVE MG/DL
KETONES UR STRIP.AUTO-MCNC: NEGATIVE MG/DL
LEUKOCYTE ESTERASE UR QL STRIP.AUTO: NEGATIVE
MDMA UR QL SCN: NEGATIVE
NITRITE UR QL STRIP.AUTO: NEGATIVE
OXYCODONE UR QL SCN: NEGATIVE
PH UR STRIP.AUTO: 5 [PH] (ref 5–8)
PROT UR STRIP.AUTO-MCNC: NEGATIVE MG/DL
SP GR UR STRIP.AUTO: >1.03 (ref 1–1.03)
UROBILINOGEN UR STRIP.AUTO-MCNC: <2 MG/DL

## 2021-09-12 PROCEDURE — 81001 URINALYSIS AUTO W/SCOPE: CPT | Performed by: EMERGENCY MEDICINE

## 2021-09-12 PROCEDURE — 80307 DRUG TEST PRSMV CHEM ANLYZR: CPT | Performed by: EMERGENCY MEDICINE

## 2021-09-12 RX ORDER — BENZONATATE 100 MG/1
100 CAPSULE ORAL 3 TIMES DAILY PRN
Qty: 30 CAPSULE | Refills: 0 | Status: SHIPPED | OUTPATIENT
Start: 2021-09-12 | End: 2021-10-12

## 2021-09-12 RX ORDER — HYDROMORPHONE HYDROCHLORIDE 1 MG/ML
1 INJECTION, SOLUTION INTRAMUSCULAR; INTRAVENOUS; SUBCUTANEOUS ONCE
Status: COMPLETED | OUTPATIENT
Start: 2021-09-12 | End: 2021-09-12

## 2021-09-12 RX ORDER — ALBUTEROL SULFATE 90 UG/1
2 AEROSOL, METERED RESPIRATORY (INHALATION) EVERY 4 HOURS PRN
Qty: 6.7 G | Refills: 0 | Status: SHIPPED | OUTPATIENT
Start: 2021-09-12 | End: 2021-10-12

## 2021-09-12 NOTE — ED PROVIDER NOTES
Patient Seen in: [facility] Emergency Department      History   Patient presents with:  Chest Pain Angina    Stated Complaint: cp    Subjective:   HPI    The patient is a 45-year-old female with a history of asthma, COPD, bipolar disorder, hypertens daily    Drug use: Not Currently      Types: \"Crack\" cocaine             Review of Systems    Positive for stated complaint: cp  Other systems are as noted in HPI. Constitutional and vital signs reviewed.       All other systems reviewed and negative exc Abnormal; Notable for the following components:       Result Value    Glucose 168 (*)     All other components within normal limits   LIPASE - Abnormal; Notable for the following components:    Lipase 57 (*)     All other components within normal limits IV analgesics and antiemetics. CT of her chest, abdomen pelvis was obtained.          MDM        CTA CHEST + CT ABD (W) + CT PEL (W) SH(CPT=71275/11579)   Final Result    PROCEDURE:  CTA CHEST + CT ABD (W) + CT PEL (W) SH(CPT=71275/80323)         COMPARISO No dilated loops of small bowel are seen. Portions of the bowel are     decompressed, limiting evaluation. There is a normal-appearing appendix. No free fluid is seen. Questionable wall thickening of the sigmoid colon     is present.   This is l sleeping, and then at other times she was on wailing out, requesting pain medications. She seemed very concerned about receiving opiate analgesics. I am suspicious that there may be some drug-seeking behavior.   Considering all of her work-up seems quite

## 2021-09-13 LAB
ATRIAL RATE: 108 BPM
P AXIS: 75 DEGREES
P-R INTERVAL: 126 MS
Q-T INTERVAL: 330 MS
QRS DURATION: 80 MS
QTC CALCULATION (BEZET): 442 MS
R AXIS: 39 DEGREES
T AXIS: 75 DEGREES
VENTRICULAR RATE: 108 BPM

## 2021-12-19 ENCOUNTER — HOSPITAL ENCOUNTER (INPATIENT)
Facility: HOSPITAL | Age: 66
LOS: 2 days | Discharge: HOME | DRG: 177 | End: 2021-12-21
Attending: EMERGENCY MEDICINE | Admitting: HOSPITALIST
Payer: MEDICARE

## 2021-12-19 ENCOUNTER — APPOINTMENT (OUTPATIENT)
Dept: CT IMAGING | Facility: HOSPITAL | Age: 66
DRG: 177 | End: 2021-12-19
Attending: HOSPITALIST
Payer: MEDICARE

## 2021-12-19 ENCOUNTER — APPOINTMENT (OUTPATIENT)
Dept: GENERAL RADIOLOGY | Facility: HOSPITAL | Age: 66
DRG: 177 | End: 2021-12-19
Attending: EMERGENCY MEDICINE
Payer: MEDICARE

## 2021-12-19 DIAGNOSIS — R07.89 CHEST PAIN, ATYPICAL: Primary | ICD-10-CM

## 2021-12-19 DIAGNOSIS — R09.02 HYPOXIA: ICD-10-CM

## 2021-12-19 DIAGNOSIS — U07.1 COVID-19: ICD-10-CM

## 2021-12-19 PROCEDURE — 99223 1ST HOSP IP/OBS HIGH 75: CPT | Performed by: HOSPITALIST

## 2021-12-19 PROCEDURE — 74176 CT ABD & PELVIS W/O CONTRAST: CPT | Performed by: HOSPITALIST

## 2021-12-19 PROCEDURE — 3E0333Z INTRODUCTION OF ANTI-INFLAMMATORY INTO PERIPHERAL VEIN, PERCUTANEOUS APPROACH: ICD-10-PCS | Performed by: HOSPITALIST

## 2021-12-19 PROCEDURE — XW033F6 INTRODUCTION OF BAMLANIVIMAB MONOCLONAL ANTIBODY INTO PERIPHERAL VEIN, PERCUTANEOUS APPROACH, NEW TECHNOLOGY GROUP 6: ICD-10-PCS | Performed by: HOSPITALIST

## 2021-12-19 PROCEDURE — 71045 X-RAY EXAM CHEST 1 VIEW: CPT | Performed by: EMERGENCY MEDICINE

## 2021-12-19 PROCEDURE — XW033E6 INTRODUCTION OF ETESEVIMAB MONOCLONAL ANTIBODY INTO PERIPHERAL VEIN, PERCUTANEOUS APPROACH, NEW TECHNOLOGY GROUP 6: ICD-10-PCS | Performed by: HOSPITALIST

## 2021-12-19 RX ORDER — POLYETHYLENE GLYCOL 3350 17 G/17G
17 POWDER, FOR SOLUTION ORAL DAILY PRN
Status: DISCONTINUED | OUTPATIENT
Start: 2021-12-19 | End: 2021-12-21

## 2021-12-19 RX ORDER — GUAIFENESIN 600 MG
600 TABLET, EXTENDED RELEASE 12 HR ORAL 2 TIMES DAILY
Status: DISCONTINUED | OUTPATIENT
Start: 2021-12-19 | End: 2021-12-21

## 2021-12-19 RX ORDER — DEXAMETHASONE SODIUM PHOSPHATE 4 MG/ML
6 VIAL (ML) INJECTION DAILY
Status: DISCONTINUED | OUTPATIENT
Start: 2021-12-20 | End: 2021-12-20

## 2021-12-19 RX ORDER — DEXTROSE MONOHYDRATE 25 G/50ML
50 INJECTION, SOLUTION INTRAVENOUS
Status: DISCONTINUED | OUTPATIENT
Start: 2021-12-19 | End: 2021-12-21

## 2021-12-19 RX ORDER — DEXAMETHASONE SODIUM PHOSPHATE 4 MG/ML
6 VIAL (ML) INJECTION DAILY
Status: DISCONTINUED | OUTPATIENT
Start: 2021-12-19 | End: 2021-12-19

## 2021-12-19 RX ORDER — CLOPIDOGREL BISULFATE 75 MG/1
75 TABLET ORAL DAILY
Status: DISCONTINUED | OUTPATIENT
Start: 2021-12-19 | End: 2021-12-21

## 2021-12-19 RX ORDER — SODIUM PHOSPHATE, DIBASIC AND SODIUM PHOSPHATE, MONOBASIC 7; 19 G/133ML; G/133ML
1 ENEMA RECTAL ONCE AS NEEDED
Status: DISCONTINUED | OUTPATIENT
Start: 2021-12-19 | End: 2021-12-21

## 2021-12-19 RX ORDER — MORPHINE SULFATE 4 MG/ML
4 INJECTION, SOLUTION INTRAMUSCULAR; INTRAVENOUS EVERY 2 HOUR PRN
Status: DISCONTINUED | OUTPATIENT
Start: 2021-12-19 | End: 2021-12-21

## 2021-12-19 RX ORDER — ONDANSETRON 2 MG/ML
4 INJECTION INTRAMUSCULAR; INTRAVENOUS EVERY 6 HOURS PRN
Status: DISCONTINUED | OUTPATIENT
Start: 2021-12-19 | End: 2021-12-21

## 2021-12-19 RX ORDER — KETOROLAC TROMETHAMINE 30 MG/ML
15 INJECTION, SOLUTION INTRAMUSCULAR; INTRAVENOUS ONCE
Status: COMPLETED | OUTPATIENT
Start: 2021-12-19 | End: 2021-12-19

## 2021-12-19 RX ORDER — MORPHINE SULFATE 2 MG/ML
1 INJECTION, SOLUTION INTRAMUSCULAR; INTRAVENOUS EVERY 2 HOUR PRN
Status: DISCONTINUED | OUTPATIENT
Start: 2021-12-19 | End: 2021-12-21

## 2021-12-19 RX ORDER — BENZONATATE 200 MG/1
200 CAPSULE ORAL 3 TIMES DAILY PRN
Status: DISCONTINUED | OUTPATIENT
Start: 2021-12-19 | End: 2021-12-21

## 2021-12-19 RX ORDER — MELATONIN
3 NIGHTLY PRN
Status: DISCONTINUED | OUTPATIENT
Start: 2021-12-19 | End: 2021-12-21

## 2021-12-19 RX ORDER — BISACODYL 10 MG
10 SUPPOSITORY, RECTAL RECTAL
Status: DISCONTINUED | OUTPATIENT
Start: 2021-12-19 | End: 2021-12-21

## 2021-12-19 RX ORDER — NICOTINE 21 MG/24HR
1 PATCH, TRANSDERMAL 24 HOURS TRANSDERMAL DAILY
Status: DISCONTINUED | OUTPATIENT
Start: 2021-12-19 | End: 2021-12-21

## 2021-12-19 RX ORDER — ASPIRIN 81 MG/1
81 TABLET, CHEWABLE ORAL DAILY
Status: DISCONTINUED | OUTPATIENT
Start: 2021-12-19 | End: 2021-12-21

## 2021-12-19 RX ORDER — HYDROCHLOROTHIAZIDE 25 MG/1
25 TABLET ORAL DAILY
Status: DISCONTINUED | OUTPATIENT
Start: 2021-12-19 | End: 2021-12-21

## 2021-12-19 RX ORDER — ATORVASTATIN CALCIUM 10 MG/1
10 TABLET, FILM COATED ORAL NIGHTLY
Status: DISCONTINUED | OUTPATIENT
Start: 2021-12-19 | End: 2021-12-21

## 2021-12-19 RX ORDER — DEXAMETHASONE SODIUM PHOSPHATE 4 MG/ML
6 VIAL (ML) INJECTION ONCE
Status: COMPLETED | OUTPATIENT
Start: 2021-12-19 | End: 2021-12-19

## 2021-12-19 RX ORDER — GABAPENTIN 300 MG/1
300 CAPSULE ORAL 3 TIMES DAILY
Status: DISCONTINUED | OUTPATIENT
Start: 2021-12-19 | End: 2021-12-21

## 2021-12-19 RX ORDER — LORAZEPAM 2 MG/ML
2 INJECTION INTRAMUSCULAR ONCE
Status: COMPLETED | OUTPATIENT
Start: 2021-12-19 | End: 2021-12-19

## 2021-12-19 RX ORDER — PANTOPRAZOLE SODIUM 40 MG/1
40 TABLET, DELAYED RELEASE ORAL
Status: DISCONTINUED | OUTPATIENT
Start: 2021-12-20 | End: 2021-12-21

## 2021-12-19 RX ORDER — SODIUM CHLORIDE 9 MG/ML
INJECTION, SOLUTION INTRAVENOUS CONTINUOUS
Status: DISCONTINUED | OUTPATIENT
Start: 2021-12-19 | End: 2021-12-21

## 2021-12-19 RX ORDER — ALBUTEROL SULFATE 90 UG/1
4 AEROSOL, METERED RESPIRATORY (INHALATION)
Status: DISCONTINUED | OUTPATIENT
Start: 2021-12-19 | End: 2021-12-19

## 2021-12-19 RX ORDER — ALBUTEROL SULFATE 90 UG/1
4 AEROSOL, METERED RESPIRATORY (INHALATION) EVERY 4 HOURS PRN
Status: DISCONTINUED | OUTPATIENT
Start: 2021-12-19 | End: 2021-12-21

## 2021-12-19 RX ORDER — PROCHLORPERAZINE EDISYLATE 5 MG/ML
5 INJECTION INTRAMUSCULAR; INTRAVENOUS EVERY 8 HOURS PRN
Status: DISCONTINUED | OUTPATIENT
Start: 2021-12-19 | End: 2021-12-21

## 2021-12-19 RX ORDER — HEPARIN SODIUM 5000 [USP'U]/ML
5000 INJECTION, SOLUTION INTRAVENOUS; SUBCUTANEOUS EVERY 8 HOURS SCHEDULED
Status: DISCONTINUED | OUTPATIENT
Start: 2021-12-19 | End: 2021-12-21

## 2021-12-19 RX ORDER — ACETAMINOPHEN 325 MG/1
650 TABLET ORAL EVERY 6 HOURS PRN
Status: DISCONTINUED | OUTPATIENT
Start: 2021-12-19 | End: 2021-12-21

## 2021-12-19 RX ORDER — MORPHINE SULFATE 2 MG/ML
2 INJECTION, SOLUTION INTRAMUSCULAR; INTRAVENOUS EVERY 2 HOUR PRN
Status: DISCONTINUED | OUTPATIENT
Start: 2021-12-19 | End: 2021-12-21

## 2021-12-19 RX ORDER — LISINOPRIL 40 MG/1
40 TABLET ORAL DAILY
Status: DISCONTINUED | OUTPATIENT
Start: 2021-12-19 | End: 2021-12-21

## 2021-12-19 RX ORDER — AMLODIPINE BESYLATE 5 MG/1
10 TABLET ORAL DAILY
Status: DISCONTINUED | OUTPATIENT
Start: 2021-12-19 | End: 2021-12-21

## 2021-12-19 RX ORDER — SENNOSIDES 8.6 MG
17.2 TABLET ORAL NIGHTLY PRN
Status: DISCONTINUED | OUTPATIENT
Start: 2021-12-19 | End: 2021-12-21

## 2021-12-19 NOTE — ED QUICK NOTES
Orders for admission, patient is aware of plan and ready to go upstairs.  Any questions, please call ED RN emmanuel at extension [phone]    Patient Covid vaccination status: Unvaccinated     COVID Test Ordered in ED: Rapid SARS-CoV-2 by PCR-positive    COVID Nora

## 2021-12-19 NOTE — ED INITIAL ASSESSMENT (HPI)
Chest pain for \"a while\" but got really bad \"at 0400\" today. Stroke in May, sugar 187, sinus tach, no STEMI on 12-lead.

## 2021-12-19 NOTE — ED PROVIDER NOTES
Patient Seen in: [facility] Emergency Department      History   Patient presents with:  Chest Pain Angina    Stated Complaint: Chest pain for \"a while\" but got really bad \"at 0400\" today.  Stroke in May, sug*    Subjective:   HPI    Patient with denies possibility of Covid infection as she has not around people she has been using an albuterol inhaler and nebulizer the last treatment was earlier this morning        Objective:   Past Medical History:   Diagnosis Date   • Acute respiratory failure (H 67.1 kg   SpO2 99%   BMI 26.22 kg/m²         Physical Exam  General: The patient is awake, alert, conversant. Patient answers questions quickly and appropriately.   Occasionally, she will cry out in discomfort, especially when she tries to move  Eyes: gabriella NATRIURETIC PEPTIDE - Normal   PROCALCITONIN - Normal    Narrative:     Resulted by: batch: PBNP, CRP, CK, FERR, LDH,    CBC WITH DIFFERENTIAL WITH PLATELET    Narrative:      The following orders were created for panel order CBC With Differential With Plat creatinine.   LFTs normal.  Lipase normal  Troponin negative  D-dimer negative  Rapid Covid positive    Chest x-ray  CONCLUSION:    Mild atelectasis at the lung base bilateral suspected.  Difficult to exclude small effusion either side but no large effusion infectious disease, Dr. Tiffanie Cummings.   She will make further recommendations        Disposition and Plan     Clinical Impression:  Chest pain, atypical  (primary encounter diagnosis)  COVID-19  Hypoxia     Disposition:  Admit  12/19/2021  5:06 pm    Follow-up:

## 2021-12-20 PROCEDURE — 99232 SBSQ HOSP IP/OBS MODERATE 35: CPT | Performed by: HOSPITALIST

## 2021-12-20 RX ORDER — HYDROCODONE BITARTRATE AND ACETAMINOPHEN 10; 325 MG/1; MG/1
1 TABLET ORAL EVERY 4 HOURS PRN
Status: DISCONTINUED | OUTPATIENT
Start: 2021-12-20 | End: 2021-12-21

## 2021-12-20 RX ORDER — BENZONATATE 100 MG/1
100 CAPSULE ORAL 3 TIMES DAILY PRN
Status: DISCONTINUED | OUTPATIENT
Start: 2021-12-20 | End: 2021-12-21

## 2021-12-20 NOTE — PLAN OF CARE
Problem: Patient/Family Goals  Goal: Patient/Family Long Term Goal  Description: Patient's Long Term Goal: discharge home    Interventions:  - follow poc: CT abdomen, inhalers, supplemental o2, pain management   - See additional Care Plan goals for speci patient reports new pain  - Anticipate increased pain with activity and pre-medicate as appropriate  Outcome: Progressing     Problem: RISK FOR INFECTION - ADULT  Goal: Absence of fever/infection during anticipated neutropenic period  Description: Dayna Tripathi Barrier  Goal: Patient/Family is able to understand and participate in their care  Description: Interventions:  - Assess communication ability and preferred communication style  - Implement communication aides and strategies  - Use visual cues when possibl

## 2021-12-20 NOTE — H&P
[facility]  History and Physical     Willie Rupa Patient Status:  Inpatient    1955 MRN [id number]   [facility] 5NW-A Attending Devonte Meier [address] Day # 0 PCP PHYSICIAN NONSTAFF     Chief Complaint: Lis Larry not use drugs.     Family History:   Family History   Problem Relation Age of Onset   • Cancer Father    • Cancer Mother    • Other (Other) Mother         Allergies:   Influenza Vaccines      HIVES, SWELLING  Onion                   HIVES, SWELLING  Penicil 25 MG Oral Tab, Take 25 mg by mouth daily. , Disp: , Rfl:   lisinopril 40 MG Oral Tab, Take 40 mg by mouth daily. , Disp: , Rfl:   VENTOLIN  (90 Base) MCG/ACT Inhalation Aero Soln, Inhale 2 puffs into the lungs every 4 (four) hours as needed. , Disp: , No lymphadenopathy. No JVD. No carotid bruits. Respiratory: Clear to auscultation bilaterally. No wheezes. No rhonchi. Cardiovascular: S1, S2. Regular rate and rhythm. No murmurs, rubs or gallops. Equal pulses.    Chest and Back: No tenderness or deformit lisinopril. #GERD   - Will continue on PPI. #Hyperlipidemia   -We will continue statin therapy. #Abdominal pain   -CAT scan of the abdomen/pelvis negative for any acute pathology.       Quality:  · DVT Prophylaxis: Heparin   · CODE status: Full  ·

## 2021-12-20 NOTE — CONSULTS
Detail Level: Detailed INFECTIOUS DISEASE CONSULTATION    Kristyn Morales Patient Status:  Inpatient    1955 MRN [id number]   [facility] 5NW-A Attending Dior Sandoval [address] Day # 1 PCP PHYSIC Gabapentin              ITCHING    Comment:Pt states no allergy to gabapentin    Medications:    Current Facility-Administered Medications:   •  benzonatate (TESSALON) cap 100 mg, 100 mg, Oral, TID PRN  •  dextromethorphan-guaiFENesin (ROBITUSSIN-DM) 10- gabapentin (NEURONTIN) cap 300 mg, 300 mg, Oral, TID  •  hydroCHLOROthiazide (HYDRODIURIL) tab 25 mg, 25 mg, Oral, Daily  •  lisinopril tab 40 mg, 40 mg, Oral, Daily  •  metoprolol tartrate (LOPRESSOR) tab 25 mg, 25 mg, Oral, BID  •  nicotine (NICODERM CQ) Oral Tab, Take 1 tablet (75 mg total) by mouth daily. , Disp: 21 tablet, Rfl: 0  folic acid 1 MG Oral Tab, Take 1 mg by mouth daily. , Disp: , Rfl:   nicotine 14 MG/24HR Transdermal Patch 24 Hr, Place 1 patch onto the skin daily. , Disp: , Rfl:   gabapentin 3 2. 5 MCG/ACT Inhalation Aero Soln, Inhale 2 puffs into the lungs daily. , Disp: 1 Inhaler, Rfl: 0        Review of Systems:    A comprehensive 10 point review of systems was completed. Pertinent positives and negatives noted in the the HPI.       Physical Ex At risk for falling     Hyponatremia     Respiratory acidosis     Influenza     Acute delirium     Bipolar affective disorder (HCC)     Anxiety disorder     Uncomplicated alcohol dependence (HCC)     Depression     Cigarette nicotine dependence with withd

## 2021-12-20 NOTE — COVID NURSING ASSESSMENT
COVID-19 Daily Discharge Readiness-Nursing    O2 Sat at Rest:    96 % on Room air   Temperature max from last 24 hrs: Temp (24hrs), Av.2 °F (36.8 °C), Min:97.7 °F (36.5 °C), Max:99.1 °F (37.3 °C)    Inflammatory Markers: Recent Labs   Lab 21  134

## 2021-12-20 NOTE — PROGRESS NOTES
NURSING ADMISSION NOTE      Patient admitted via Cart  Oriented to room. Safety precautions initiated. Bed in low position. Call light in reach. Received pt from ED. Pt AOx4.  RA. C/o 7/10 chest pain that radiates from abdomen, to both arms and to

## 2021-12-21 VITALS
HEIGHT: 63 IN | BODY MASS INDEX: 26.22 KG/M2 | TEMPERATURE: 99 F | SYSTOLIC BLOOD PRESSURE: 124 MMHG | RESPIRATION RATE: 18 BRPM | WEIGHT: 148 LBS | DIASTOLIC BLOOD PRESSURE: 89 MMHG | OXYGEN SATURATION: 91 % | HEART RATE: 95 BPM

## 2021-12-21 PROCEDURE — 99239 HOSP IP/OBS DSCHRG MGMT >30: CPT | Performed by: HOSPITALIST

## 2021-12-21 RX ORDER — GUAIFENESIN 600 MG
600 TABLET, EXTENDED RELEASE 12 HR ORAL 2 TIMES DAILY
Qty: 10 TABLET | Refills: 0 | Status: SHIPPED | OUTPATIENT
Start: 2021-12-21

## 2021-12-21 RX ORDER — FLUTICASONE FUROATE, UMECLIDINIUM BROMIDE AND VILANTEROL TRIFENATATE 200; 62.5; 25 UG/1; UG/1; UG/1
1 POWDER RESPIRATORY (INHALATION) DAILY
Qty: 3 EACH | Refills: 3 | Status: SHIPPED | OUTPATIENT
Start: 2021-12-21

## 2021-12-21 RX ORDER — BENZONATATE 200 MG/1
200 CAPSULE ORAL 3 TIMES DAILY PRN
Qty: 30 CAPSULE | Refills: 3 | Status: ON HOLD | OUTPATIENT
Start: 2021-12-21 | End: 2021-12-24

## 2021-12-21 RX ORDER — HYDROCODONE BITARTRATE AND ACETAMINOPHEN 10; 325 MG/1; MG/1
1 TABLET ORAL EVERY 8 HOURS PRN
Qty: 20 TABLET | Refills: 0 | Status: SHIPPED | OUTPATIENT
Start: 2021-12-21

## 2021-12-21 NOTE — PROGRESS NOTES
COVID-19 Daily Discharge Readiness-Nursing    O2 Sat at Rest: 93% on RA  O2 Sat with Exertion:   Temperature max from last 24 hrs: Temp (24hrs), Av.7 °F (37.1 °C), Min:98.3 °F (36.8 °C), Max:99 °F (37.2 °C)    Inflammatory Markers: Recent Labs   Lab 12

## 2021-12-21 NOTE — PROGRESS NOTES
[facility]                INFECTIOUS DISEASE PROGRESS NOTE    Marisa Harrison Patient Status:  Inpatient    1955 MRN [id number]   [facility] 5NW-A [facility] Day # 2 PCP PHYSICIAN NONSTAFF 0.3   TP 7.2 6.4 6.5       No results found for: [address] Encounter on 12/19/21   1.  Blood Culture     Status: None (Preliminary result)    Collection Time: 12/19/21  9:51 PM    Specimen: Blood,peripheral   Result Value Ref Range

## 2021-12-21 NOTE — PLAN OF CARE
COVID-19 Daily Discharge Readiness-Nursing    O2 Sat at Rest:  SPO2% on Room Air at Rest: 96  %   O2 Sat with Exertion:    % on    liters   Temperature max from last 24 hrs: Temp (24hrs), Av.8 °F (37.1 °C), Min:98.4 °F (36.9 °C), Max:99 °F (37.2 °C) anxiety  - Monitor for signs/symptoms of CO2 retention  Outcome: Progressing     Problem: PAIN - ADULT  Goal: Verbalizes/displays adequate comfort level or patient's stated pain goal  Description: INTERVENTIONS:  - Encourage pt to monitor pain and request discharge planning  - Arrange for needed discharge resources and transportation as appropriate  - Identify discharge learning needs (meds, wound care, etc)  - Arrange for interpreters to assist at discharge as needed  - Consider post-discharge preferences

## 2021-12-21 NOTE — PLAN OF CARE
Problem: Patient/Family Goals  Goal: Patient/Family Long Term Goal  Description: Patient's Long Term Goal: discharge home    Interventions:  - follow poc: CT abdomen, inhalers, supplemental o2, pain management   - See additional Care Plan goals for speci patient reports new pain  - Anticipate increased pain with activity and pre-medicate as appropriate  Outcome: Progressing     Problem: RISK FOR INFECTION - ADULT  Goal: Absence of fever/infection during anticipated neutropenic period  Description: Madelin Cheng Barrier  Goal: Patient/Family is able to understand and participate in their care  Description: Interventions:  - Assess communication ability and preferred communication style  - Implement communication aides and strategies  - Use visual cues when possibl

## 2021-12-21 NOTE — PROGRESS NOTES
NURSING DISCHARGE NOTE    Discharged Home via Ambulance. Accompanied by AMBULANCE STAFF  Belongings Taken by patient/family.   DISCHARGE INSTRUCTIONS AND MEDICATIONS DISCUSSED WITH PT. FAMILY UNABLE TO  PT FROM HOME ,PER DAUGHTER THEY ARE ALL PO

## 2021-12-21 NOTE — PROGRESS NOTES
COVID-19 Daily Discharge Readiness-Nursing    O2 Sat at Rest:  SPO2% on Room Air at Rest: 96  %   O2 Sat with Exertion:    % on    liters   Temperature max from last 24 hrs: Temp (24hrs), Av.2 °F (36.8 °C), Min:97.7 °F (36.5 °C), Max:98.8 °F (37.1 °C)

## 2021-12-21 NOTE — PROGRESS NOTES
12/20/21 1837   Mobility   O2 walk?  Yes   SPO2% on Room Air at Rest 96   SPO2% Ambulation on Room Air 94

## 2021-12-21 NOTE — PROGRESS NOTES
[facility]     Hospitalist Progress Note     Adore Pan Patient Status:  Inpatient    1955 MRN [id number]   [facility] 5NW-A Att[facility] PCP PHYSICIAN NONSTAFF     Chief Complaint: Jamal Blakely Markers  Recent Labs   Lab 12/19/21  1341 12/19/21  2151 12/20/21  0723   CRP <0.29  --  <0.29   TIFFANIE 69.5  --  76.2   *  --  288*   DDIMER 0.41 0.41 0.40       Imaging: Imaging data reviewed in Epic.     Medications:   • guaiFENesin ER  600 mg Oral B

## 2021-12-23 ENCOUNTER — HOSPITAL ENCOUNTER (OUTPATIENT)
Facility: HOSPITAL | Age: 66
Setting detail: OBSERVATION
LOS: 1 days | Discharge: HOME | End: 2021-12-24
Attending: EMERGENCY MEDICINE | Admitting: HOSPITALIST
Payer: MEDICARE

## 2021-12-23 ENCOUNTER — APPOINTMENT (OUTPATIENT)
Dept: GENERAL RADIOLOGY | Facility: HOSPITAL | Age: 66
End: 2021-12-23
Attending: EMERGENCY MEDICINE
Payer: MEDICARE

## 2021-12-23 ENCOUNTER — APPOINTMENT (OUTPATIENT)
Dept: CT IMAGING | Facility: HOSPITAL | Age: 66
End: 2021-12-23
Attending: EMERGENCY MEDICINE
Payer: MEDICARE

## 2021-12-23 DIAGNOSIS — R10.10 UPPER ABDOMINAL PAIN: ICD-10-CM

## 2021-12-23 DIAGNOSIS — U07.1 COVID-19: Primary | ICD-10-CM

## 2021-12-23 PROCEDURE — 99220 INITIAL OBSERVATION CARE,LEVL III: CPT | Performed by: HOSPITALIST

## 2021-12-23 PROCEDURE — 71045 X-RAY EXAM CHEST 1 VIEW: CPT | Performed by: EMERGENCY MEDICINE

## 2021-12-23 PROCEDURE — 74177 CT ABD & PELVIS W/CONTRAST: CPT | Performed by: EMERGENCY MEDICINE

## 2021-12-23 RX ORDER — LORAZEPAM 2 MG/ML
0.5 INJECTION INTRAMUSCULAR ONCE
Status: COMPLETED | OUTPATIENT
Start: 2021-12-23 | End: 2021-12-23

## 2021-12-23 RX ORDER — MELATONIN
3 NIGHTLY PRN
Status: DISCONTINUED | OUTPATIENT
Start: 2021-12-23 | End: 2021-12-24

## 2021-12-23 RX ORDER — ACETAMINOPHEN 325 MG/1
650 TABLET ORAL EVERY 6 HOURS PRN
Status: DISCONTINUED | OUTPATIENT
Start: 2021-12-23 | End: 2021-12-24

## 2021-12-23 RX ORDER — POTASSIUM CHLORIDE 20 MEQ/1
40 TABLET, EXTENDED RELEASE ORAL EVERY 4 HOURS
Status: COMPLETED | OUTPATIENT
Start: 2021-12-23 | End: 2021-12-23

## 2021-12-23 RX ORDER — LORAZEPAM 2 MG/ML
0.5 INJECTION INTRAMUSCULAR ONCE
Status: DISCONTINUED | OUTPATIENT
Start: 2021-12-23 | End: 2021-12-24

## 2021-12-23 RX ORDER — METHYLPREDNISOLONE SODIUM SUCCINATE 125 MG/2ML
60 INJECTION, POWDER, LYOPHILIZED, FOR SOLUTION INTRAMUSCULAR; INTRAVENOUS ONCE
Status: COMPLETED | OUTPATIENT
Start: 2021-12-23 | End: 2021-12-23

## 2021-12-23 RX ORDER — HYDROCODONE BITARTRATE AND ACETAMINOPHEN 10; 325 MG/1; MG/1
1 TABLET ORAL EVERY 4 HOURS PRN
Status: DISCONTINUED | OUTPATIENT
Start: 2021-12-23 | End: 2021-12-24

## 2021-12-23 RX ORDER — ONDANSETRON 2 MG/ML
4 INJECTION INTRAMUSCULAR; INTRAVENOUS EVERY 4 HOURS PRN
Status: DISCONTINUED | OUTPATIENT
Start: 2021-12-23 | End: 2021-12-23

## 2021-12-23 RX ORDER — PROCHLORPERAZINE EDISYLATE 5 MG/ML
5 INJECTION INTRAMUSCULAR; INTRAVENOUS EVERY 8 HOURS PRN
Status: DISCONTINUED | OUTPATIENT
Start: 2021-12-23 | End: 2021-12-24

## 2021-12-23 RX ORDER — DEXTROSE MONOHYDRATE 25 G/50ML
50 INJECTION, SOLUTION INTRAVENOUS
Status: DISCONTINUED | OUTPATIENT
Start: 2021-12-23 | End: 2021-12-24

## 2021-12-23 RX ORDER — ENOXAPARIN SODIUM 100 MG/ML
40 INJECTION SUBCUTANEOUS DAILY
Status: DISCONTINUED | OUTPATIENT
Start: 2021-12-23 | End: 2021-12-24

## 2021-12-23 RX ORDER — GABAPENTIN 300 MG/1
300 CAPSULE ORAL 3 TIMES DAILY
Status: DISCONTINUED | OUTPATIENT
Start: 2021-12-23 | End: 2021-12-24

## 2021-12-23 RX ORDER — ALBUTEROL SULFATE 90 UG/1
2 AEROSOL, METERED RESPIRATORY (INHALATION) 4 TIMES DAILY
Status: DISCONTINUED | OUTPATIENT
Start: 2021-12-23 | End: 2021-12-24

## 2021-12-23 RX ORDER — KETOROLAC TROMETHAMINE 30 MG/ML
15 INJECTION, SOLUTION INTRAMUSCULAR; INTRAVENOUS ONCE
Status: COMPLETED | OUTPATIENT
Start: 2021-12-23 | End: 2021-12-23

## 2021-12-23 RX ORDER — ACETAMINOPHEN 500 MG
1000 TABLET ORAL ONCE
Status: DISCONTINUED | OUTPATIENT
Start: 2021-12-23 | End: 2021-12-24

## 2021-12-23 RX ORDER — ONDANSETRON 2 MG/ML
4 INJECTION INTRAMUSCULAR; INTRAVENOUS EVERY 6 HOURS PRN
Status: DISCONTINUED | OUTPATIENT
Start: 2021-12-23 | End: 2021-12-24

## 2021-12-23 NOTE — ED PROVIDER NOTES
Patient Seen in: [facility] Emergency Department      History   Patient presents with:  Difficulty Breathing    Stated Complaint: shortness of breath    Subjective:   HPI  Covid symptoms apparently started on the 15th- unvaccinated    -Americ Start date: 1/29/1970      Smokeless tobacco: Never Used      Tobacco comment: 4 cigarette a day     Vaping Use      Vaping Use: Never used    Alcohol use:  Yes      Alcohol/week: 1.0 standard drink      Types: 1 Cans of beer per week      Comment: lina General: Bowel sounds are normal. There is no distension. Palpations: Abdomen is soft. Tenderness: There is no abdominal tenderness. There is no guarding or rebound.       Comments: Not particularly tender - no rebound or guarding, mildly obese ab HCT 48.7 (*)     All other components within normal limits   TROPONIN I HIGH SENSITIVITY - Normal   D-DIMER - Normal   PROTHROMBIN TIME (PT) - Normal   PTT, ACTIVATED - Normal   LIPASE - Normal   FERRITIN - Normal   POTASSIUM - Normal   POCT GLUCOSE - Norm abnormality         ADRENALS:  Unremarkable. AORTA/VASCULAR:  There are atherosclerotic changes including calcification     involving the aorta, but no evidence for aneurysm involving the aorta. RETROPERITONEUM:  Unremarkable.          BOWEL Finalized by (CST): Heidi Bird MD on 12/23/2021 at 7:12 AM          ED Course as of 12/29/21 0600  ------------------------------------------------------------  Time: 12/23 1002  Comment: Feeling better now  Ekg abnl  Troponin negative  Ddimer negative  -- PANCREAS:  Unremarkable. SPLEEN:  Unremarkable. KIDNEYS:  Cortical scarring seen bilateral kidney upper and lower, more     notable on the right side, no acute kidney abnormality         ADRENALS:  Unremarkable.          AORTA/VASCULAR:  Th effusion, or pneumothorax.                               =====    CONCLUSION:  No focal consolidation.                    Dictated by (CST): Fernando Del Real MD on 12/23/2021 at 7:12 AM         Finalized by (CST): Fernando Del Real MD on 12/23/2021 at 7:12 AM        EKG ICD-10-CM Noted POA    * (Principal) COVID-19 U07.1 12/19/2021 Yes    Dyslipidemia E78.5 Unknown Yes    Essential hypertension I10 Unknown Yes    Type 2 diabetes mellitus with hyperglycemia, with long-term current use of insulin (HCC) E11.65, Z79.4 Unknown

## 2021-12-23 NOTE — PROGRESS NOTES
NURSING ADMISSION NOTE      Patient admitted via Cart  Oriented to room. Safety precautions initiated. Bed in low position. Call light in reach. Received pt alert and orientated x4. C/o of abdominal and back pain. Temp 100. 3. HR tachy, 140s.  MD aware

## 2021-12-23 NOTE — ED INITIAL ASSESSMENT (HPI)
Pt presents to Ed with c/o  shortness of breath all day. Pt jusr dc'd from hospital  2 days piior. Pt is COVID+.

## 2021-12-23 NOTE — ED QUICK NOTES
Orders for admission, patient is aware of plan and ready to go upstairs.  Any questions, please call ED RN Laura Menchaca  at extension [phone]     Vaccinated? no  Type of COVID test sent:pt covid positive on 12/15  COVID Suspicion level: Low/High      Titratable d

## 2021-12-24 VITALS
BODY MASS INDEX: 24.8 KG/M2 | TEMPERATURE: 98 F | SYSTOLIC BLOOD PRESSURE: 128 MMHG | DIASTOLIC BLOOD PRESSURE: 96 MMHG | RESPIRATION RATE: 15 BRPM | HEART RATE: 94 BPM | OXYGEN SATURATION: 95 % | HEIGHT: 63 IN | WEIGHT: 140 LBS

## 2021-12-24 PROCEDURE — 99217 OBSERVATION CARE DISCHARGE: CPT | Performed by: HOSPITALIST

## 2021-12-24 RX ORDER — LISINOPRIL 40 MG/1
40 TABLET ORAL DAILY
Status: DISCONTINUED | OUTPATIENT
Start: 2021-12-24 | End: 2021-12-24

## 2021-12-24 RX ORDER — ALBUTEROL SULFATE 90 UG/1
2 AEROSOL, METERED RESPIRATORY (INHALATION) EVERY 4 HOURS PRN
Status: DISCONTINUED | OUTPATIENT
Start: 2021-12-24 | End: 2021-12-24

## 2021-12-24 RX ORDER — BENZONATATE 200 MG/1
200 CAPSULE ORAL 3 TIMES DAILY PRN
Status: DISCONTINUED | OUTPATIENT
Start: 2021-12-24 | End: 2021-12-24

## 2021-12-24 RX ORDER — CLOPIDOGREL BISULFATE 75 MG/1
75 TABLET ORAL DAILY
Status: DISCONTINUED | OUTPATIENT
Start: 2021-12-24 | End: 2021-12-24

## 2021-12-24 RX ORDER — AMLODIPINE BESYLATE 10 MG/1
10 TABLET ORAL DAILY
Qty: 30 TABLET | Refills: 0 | Status: SHIPPED | OUTPATIENT
Start: 2021-12-24 | End: 2021-12-24

## 2021-12-24 RX ORDER — ATORVASTATIN CALCIUM 10 MG/1
10 TABLET, FILM COATED ORAL NIGHTLY
Status: DISCONTINUED | OUTPATIENT
Start: 2021-12-24 | End: 2021-12-24

## 2021-12-24 RX ORDER — HYDROCHLOROTHIAZIDE 25 MG/1
25 TABLET ORAL DAILY
Qty: 30 TABLET | Refills: 0 | Status: SHIPPED | OUTPATIENT
Start: 2021-12-24

## 2021-12-24 RX ORDER — AMLODIPINE BESYLATE 10 MG/1
10 TABLET ORAL DAILY
Qty: 30 TABLET | Refills: 0 | Status: SHIPPED | OUTPATIENT
Start: 2021-12-24

## 2021-12-24 RX ORDER — PANTOPRAZOLE SODIUM 40 MG/1
40 TABLET, DELAYED RELEASE ORAL
Status: DISCONTINUED | OUTPATIENT
Start: 2021-12-24 | End: 2021-12-24

## 2021-12-24 RX ORDER — GUAIFENESIN 600 MG
600 TABLET, EXTENDED RELEASE 12 HR ORAL 2 TIMES DAILY
Status: DISCONTINUED | OUTPATIENT
Start: 2021-12-24 | End: 2021-12-24

## 2021-12-24 RX ORDER — PREDNISONE 10 MG/1
TABLET ORAL
Qty: 31 TABLET | Refills: 0 | Status: SHIPPED | OUTPATIENT
Start: 2021-12-24

## 2021-12-24 RX ORDER — HYDROCHLOROTHIAZIDE 25 MG/1
25 TABLET ORAL DAILY
Qty: 30 TABLET | Refills: 0 | Status: SHIPPED | OUTPATIENT
Start: 2021-12-24 | End: 2021-12-24

## 2021-12-24 RX ORDER — BENZONATATE 200 MG/1
200 CAPSULE ORAL 3 TIMES DAILY PRN
Qty: 30 CAPSULE | Refills: 3 | Status: SHIPPED | OUTPATIENT
Start: 2021-12-24

## 2021-12-24 RX ORDER — BENZONATATE 200 MG/1
200 CAPSULE ORAL 3 TIMES DAILY PRN
Qty: 30 CAPSULE | Refills: 3 | Status: SHIPPED | OUTPATIENT
Start: 2021-12-24 | End: 2021-12-24

## 2021-12-24 RX ORDER — PREDNISONE 10 MG/1
TABLET ORAL
Qty: 31 TABLET | Refills: 0 | Status: SHIPPED | OUTPATIENT
Start: 2021-12-24 | End: 2021-12-24

## 2021-12-24 RX ORDER — HYDROCHLOROTHIAZIDE 25 MG/1
25 TABLET ORAL DAILY
Status: DISCONTINUED | OUTPATIENT
Start: 2021-12-24 | End: 2021-12-24

## 2021-12-24 RX ORDER — NICOTINE 21 MG/24HR
1 PATCH, TRANSDERMAL 24 HOURS TRANSDERMAL DAILY
Status: DISCONTINUED | OUTPATIENT
Start: 2021-12-24 | End: 2021-12-24

## 2021-12-24 RX ORDER — ASPIRIN 81 MG/1
81 TABLET, CHEWABLE ORAL DAILY
Status: DISCONTINUED | OUTPATIENT
Start: 2021-12-24 | End: 2021-12-24

## 2021-12-24 RX ORDER — AMLODIPINE BESYLATE 5 MG/1
10 TABLET ORAL DAILY
Status: DISCONTINUED | OUTPATIENT
Start: 2021-12-24 | End: 2021-12-24

## 2021-12-24 NOTE — PLAN OF CARE
Assumed care of pt at 1, pt rating pain to her back and abdomen 10/10 stated back pain is chronic but abdominal pain is not. Pt describes abdominal pain as sharp and shooting in nature to mid upper abdomen.  Pt with temp of 101.6 but refusing tylenol sta

## 2021-12-24 NOTE — PROGRESS NOTES
Paged for worsening pain  Has fever but refuses OTC tylenol  +COVID- holding motrin  Restart her gabapentin and norco  Pt diffusely wheezing with h/o Asthma- give solumedrol and inhalers now

## 2021-12-24 NOTE — PLAN OF CARE
Pt passed 02 walk. 94% on RA and walking pt at 93%. Pt given AM medications. Pt instructed to take one at a time and go slow. Pt proceded to take the entire cup of meds at once. Pt started choking on meds.  Pt instructed to try and cough up some of th

## 2021-12-24 NOTE — CM/SW NOTE
CM was asked to set up [facility] to home for 2pm. PCS completed.  &  to remain available and supportive for discharge planning needs.     Kathy Conklin, RN Case Manager [phone]

## 2021-12-24 NOTE — PROGRESS NOTES
[facility]     Hospitalist Progress Note     Adore Pan Patient Status:  Inpatient    1955 MRN [id number]   [facility] 4NW-A Attending John Crespo MD   Hosp Day # 1 PCP PHYSICIAN NONSTAFF     Chief Complaint: SOB    Subj Value Date    COVID19 Detected (A) 12/19/2021    COVID19 Not Detected 09/11/2021    COVID19 Not Detected 08/09/2021       Pro-Calcitonin  Recent Labs   Lab 12/19/21  1341   PCT <0.05       Cardiac  Recent Labs   Lab 12/19/21  1341   PBNP 40       Creatinin Full  · Samson: no  · Central line: no  · If COVID testing is negative, may discontinue isolation: n/a    Will the patient be referred to TCC on discharge?: no  Estimated date of discharge: 0-1 days  Discharge is dependent on: progress  At this point Ms. Salguero

## 2021-12-24 NOTE — CONSULTS
INFECTIOUS DISEASE TELEMEDICINE CONSULT NOTE    Willie Goode Patient Status:  Inpatient    1955 MRN [id number]   [facility] 4NW-A Attending Katharine Baugh MD   [facility] Day # 1 P 1.0 standard drink of alcohol per week. She reports that she does not use drugs.     Allergies:    Influenza Vaccines      HIVES, SWELLING  Onion                   HIVES, SWELLING  Penicillins               Gabapentin              ITCHING    Comment:Pt stat Results   Component Value Date    K 5.0 12/24/2021    PGLU 161 12/24/2021       Microbiologic Data:   (this admission)    Reviewed     Imaging:  X-Ray and CT Scan    Imaging results reviewed     Impression:  Patient Active Problem List:     Hypokalemia

## 2021-12-24 NOTE — PLAN OF CARE
Pt supposed to discharge via [address] However due to positive COVID, pt will need BLS. Spoke to ambulance. They are in route currently.

## 2021-12-24 NOTE — PLAN OF CARE
NURSING DISCHARGE NOTE    Discharged Home via Ambulance. Accompanied by EMS  Belongings Taken by patient/family. Patient's medications filled at [facility] were given to patient. Pt paid for this over phone.  Pt was directed to monitor diet due

## 2022-01-01 ENCOUNTER — APPOINTMENT (OUTPATIENT)
Dept: CT IMAGING | Facility: HOSPITAL | Age: 67
DRG: 003 | End: 2022-01-01
Attending: NURSE PRACTITIONER
Payer: MEDICARE

## 2022-01-01 ENCOUNTER — HOSPITAL ENCOUNTER (EMERGENCY)
Facility: HOSPITAL | Age: 67
Discharge: HOME | End: 2022-01-01
Attending: EMERGENCY MEDICINE
Payer: MEDICARE

## 2022-01-01 ENCOUNTER — APPOINTMENT (OUTPATIENT)
Dept: INTERVENTIONAL RADIOLOGY/VASCULAR | Facility: HOSPITAL | Age: 67
DRG: 003 | End: 2022-01-01
Attending: INTERNAL MEDICINE
Payer: MEDICARE

## 2022-01-01 ENCOUNTER — APPOINTMENT (OUTPATIENT)
Dept: NUCLEAR MEDICINE | Facility: HOSPITAL | Age: 67
DRG: 003 | End: 2022-01-01
Attending: Other
Payer: MEDICARE

## 2022-01-01 ENCOUNTER — HOSPITAL ENCOUNTER (INPATIENT)
Facility: HOSPITAL | Age: 67
LOS: 6 days | DRG: 003 | End: 2022-01-01
Attending: EMERGENCY MEDICINE | Admitting: INTERNAL MEDICINE
Payer: MEDICARE

## 2022-01-01 ENCOUNTER — ANESTHESIA EVENT (OUTPATIENT)
Dept: CARDIAC SURGERY | Facility: HOSPITAL | Age: 67
DRG: 003 | End: 2022-01-01
Payer: MEDICARE

## 2022-01-01 ENCOUNTER — NURSE ONLY (OUTPATIENT)
Dept: ELECTROPHYSIOLOGY | Facility: HOSPITAL | Age: 67
DRG: 003 | End: 2022-01-01
Attending: INTERNAL MEDICINE
Payer: MEDICARE

## 2022-01-01 ENCOUNTER — ANESTHESIA (OUTPATIENT)
Dept: CARDIAC SURGERY | Facility: HOSPITAL | Age: 67
DRG: 003 | End: 2022-01-01
Payer: MEDICARE

## 2022-01-01 ENCOUNTER — APPOINTMENT (OUTPATIENT)
Dept: CT IMAGING | Facility: HOSPITAL | Age: 67
DRG: 003 | End: 2022-01-01
Attending: INTERNAL MEDICINE
Payer: MEDICARE

## 2022-01-01 ENCOUNTER — APPOINTMENT (OUTPATIENT)
Dept: GENERAL RADIOLOGY | Facility: HOSPITAL | Age: 67
DRG: 003 | End: 2022-01-01
Attending: EMERGENCY MEDICINE
Payer: MEDICARE

## 2022-01-01 ENCOUNTER — APPOINTMENT (OUTPATIENT)
Dept: GENERAL RADIOLOGY | Facility: HOSPITAL | Age: 67
DRG: 003 | End: 2022-01-01
Attending: INTERNAL MEDICINE
Payer: MEDICARE

## 2022-01-01 ENCOUNTER — APPOINTMENT (OUTPATIENT)
Dept: GENERAL RADIOLOGY | Facility: HOSPITAL | Age: 67
DRG: 003 | End: 2022-01-01
Attending: NURSE PRACTITIONER
Payer: MEDICARE

## 2022-01-01 VITALS
HEART RATE: 97 BPM | SYSTOLIC BLOOD PRESSURE: 124 MMHG | DIASTOLIC BLOOD PRESSURE: 94 MMHG | WEIGHT: 140 LBS | TEMPERATURE: 98 F | OXYGEN SATURATION: 92 % | BODY MASS INDEX: 25 KG/M2 | RESPIRATION RATE: 23 BRPM

## 2022-01-01 VITALS
TEMPERATURE: 99 F | OXYGEN SATURATION: 97 % | BODY MASS INDEX: 29 KG/M2 | DIASTOLIC BLOOD PRESSURE: 66 MMHG | SYSTOLIC BLOOD PRESSURE: 130 MMHG | WEIGHT: 162.25 LBS

## 2022-01-01 DIAGNOSIS — E87.2 ACIDOSIS: ICD-10-CM

## 2022-01-01 DIAGNOSIS — I46.9 CARDIAC ARREST (HCC): Primary | ICD-10-CM

## 2022-01-01 DIAGNOSIS — M54.12 CERVICAL RADICULOPATHY: Primary | ICD-10-CM

## 2022-01-01 DIAGNOSIS — J44.1 COPD EXACERBATION (HCC): ICD-10-CM

## 2022-01-01 DIAGNOSIS — J96.01 ACUTE RESPIRATORY FAILURE WITH HYPOXIA AND HYPERCARBIA (HCC): ICD-10-CM

## 2022-01-01 DIAGNOSIS — J96.02 ACUTE RESPIRATORY FAILURE WITH HYPOXIA AND HYPERCARBIA (HCC): ICD-10-CM

## 2022-01-01 LAB
ADENOVIRUS PCR:: NOT DETECTED
ALBUMIN SERPL-MCNC: 2 G/DL (ref 3.4–5)
ALBUMIN SERPL-MCNC: 2.4 G/DL (ref 3.4–5)
ALBUMIN SERPL-MCNC: 2.5 G/DL (ref 3.4–5)
ALBUMIN SERPL-MCNC: 2.7 G/DL (ref 3.4–5)
ALBUMIN SERPL-MCNC: 2.9 G/DL (ref 3.4–5)
ALBUMIN SERPL-MCNC: 3.1 G/DL (ref 3.4–5)
ALBUMIN SERPL-MCNC: 3.6 G/DL (ref 3.4–5)
ALBUMIN SERPL-MCNC: 3.6 G/DL (ref 3.4–5)
ALBUMIN/GLOB SERPL: 0.9 {RATIO} (ref 1–2)
ALBUMIN/GLOB SERPL: 0.9 {RATIO} (ref 1–2)
ALBUMIN/GLOB SERPL: 1 {RATIO} (ref 1–2)
ALBUMIN/GLOB SERPL: 1 {RATIO} (ref 1–2)
ALBUMIN/GLOB SERPL: 1.1 {RATIO} (ref 1–2)
ALBUMIN/GLOB SERPL: 1.1 {RATIO} (ref 1–2)
ALBUMIN/GLOB SERPL: 1.2 {RATIO} (ref 1–2)
ALBUMIN/GLOB SERPL: 1.3 {RATIO} (ref 1–2)
ALP LIVER SERPL-CCNC: 47 U/L
ALP LIVER SERPL-CCNC: 55 U/L
ALP LIVER SERPL-CCNC: 56 U/L
ALP LIVER SERPL-CCNC: 60 U/L
ALP LIVER SERPL-CCNC: 67 U/L
ALP LIVER SERPL-CCNC: 68 U/L
ALP LIVER SERPL-CCNC: 74 U/L
ALP LIVER SERPL-CCNC: 90 U/L
ALT SERPL-CCNC: 109 U/L
ALT SERPL-CCNC: 117 U/L
ALT SERPL-CCNC: 123 U/L
ALT SERPL-CCNC: 128 U/L
ALT SERPL-CCNC: 140 U/L
ALT SERPL-CCNC: 151 U/L
ALT SERPL-CCNC: 21 U/L
ALT SERPL-CCNC: 65 U/L
ANION GAP SERPL CALC-SCNC: 10 MMOL/L (ref 0–18)
ANION GAP SERPL CALC-SCNC: 10 MMOL/L (ref 0–18)
ANION GAP SERPL CALC-SCNC: 13 MMOL/L (ref 0–18)
ANION GAP SERPL CALC-SCNC: 5 MMOL/L (ref 0–18)
ANION GAP SERPL CALC-SCNC: 6 MMOL/L (ref 0–18)
ANION GAP SERPL CALC-SCNC: 7 MMOL/L (ref 0–18)
ANION GAP SERPL CALC-SCNC: 8 MMOL/L (ref 0–18)
ANION GAP SERPL CALC-SCNC: 8 MMOL/L (ref 0–18)
ANION GAP SERPL CALC-SCNC: 9 MMOL/L (ref 0–18)
ANTIBODY SCREEN: NEGATIVE
ANTIBODY SCREEN: NEGATIVE
APTT PPP: 133.9 SECONDS (ref 23.3–35.6)
APTT PPP: 25.4 SECONDS (ref 23.3–35.6)
APTT PPP: 43.3 SECONDS (ref 23.3–35.6)
APTT PPP: 54.2 SECONDS (ref 23.3–35.6)
APTT PPP: 68.9 SECONDS (ref 23.3–35.6)
ARTERIAL PATENCY WRIST A: POSITIVE
AST SERPL-CCNC: 11 U/L (ref 15–37)
AST SERPL-CCNC: 192 U/L (ref 15–37)
AST SERPL-CCNC: 227 U/L (ref 15–37)
AST SERPL-CCNC: 335 U/L (ref 15–37)
AST SERPL-CCNC: 740 U/L (ref 15–37)
AST SERPL-CCNC: 750 U/L (ref 15–37)
AST SERPL-CCNC: 82 U/L (ref 15–37)
AST SERPL-CCNC: 907 U/L (ref 15–37)
ATRIAL RATE: 107 BPM
ATRIAL RATE: 111 BPM
ATRIAL RATE: 187 BPM
ATRIAL RATE: 50 BPM
B PARAPERT DNA SPEC QL NAA+PROBE: NOT DETECTED
B PERT DNA SPEC QL NAA+PROBE: NOT DETECTED
BASE EXCESS BLD CALC-SCNC: -3 MMOL/L
BASE EXCESS BLD CALC-SCNC: -4 MMOL/L
BASE EXCESS BLD CALC-SCNC: -6 MMOL/L
BASE EXCESS BLD CALC-SCNC: -6 MMOL/L
BASE EXCESS BLD CALC-SCNC: 0 MMOL/L
BASE EXCESS BLD CALC-SCNC: 0 MMOL/L
BASE EXCESS BLD CALC-SCNC: 1 MMOL/L
BASE EXCESS BLD CALC-SCNC: 15 MMOL/L
BASE EXCESS BLD CALC-SCNC: 2 MMOL/L
BASE EXCESS BLD CALC-SCNC: 2 MMOL/L
BASE EXCESS BLD CALC-SCNC: 3 MMOL/L
BASE EXCESS BLD CALC-SCNC: 4 MMOL/L
BASE EXCESS BLD CALC-SCNC: 5 MMOL/L
BASE EXCESS BLD CALC-SCNC: 6 MMOL/L
BASE EXCESS BLD CALC-SCNC: 6 MMOL/L
BASE EXCESS BLDA CALC-SCNC: -2 MMOL/L (ref ?–30)
BASE EXCESS BLDA CALC-SCNC: -2.6 MMOL/L (ref ?–2)
BASE EXCESS BLDA CALC-SCNC: -3 MMOL/L (ref ?–30)
BASE EXCESS BLDA CALC-SCNC: -4 MMOL/L (ref ?–30)
BASE EXCESS BLDA CALC-SCNC: 1 MMOL/L (ref ?–30)
BASE EXCESS BLDA CALC-SCNC: 1.6 MMOL/L (ref ?–2)
BASE EXCESS BLDA CALC-SCNC: 2 MMOL/L (ref ?–30)
BASE EXCESS BLDA CALC-SCNC: 2.6 MMOL/L (ref ?–2)
BASE EXCESS BLDA CALC-SCNC: 2.6 MMOL/L (ref ?–2)
BASE EXCESS BLDA CALC-SCNC: 3 MMOL/L (ref ?–30)
BASE EXCESS BLDA CALC-SCNC: 4 MMOL/L (ref ?–30)
BASE EXCESS BLDA CALC-SCNC: 4.8 MMOL/L (ref ?–2)
BASE EXCESS BLDA CALC-SCNC: 5 MMOL/L (ref ?–30)
BASE EXCESS BLDA CALC-SCNC: 6 MMOL/L (ref ?–30)
BASE EXCESS BLDA CALC-SCNC: 8 MMOL/L (ref ?–30)
BASE EXCESS BLDA CALC-SCNC: 8 MMOL/L (ref ?–30)
BASOPHILS # BLD AUTO: 0.02 X10(3) UL (ref 0–0.2)
BASOPHILS # BLD AUTO: 0.02 X10(3) UL (ref 0–0.2)
BASOPHILS # BLD AUTO: 0.03 X10(3) UL (ref 0–0.2)
BASOPHILS # BLD AUTO: 0.04 X10(3) UL (ref 0–0.2)
BASOPHILS # BLD AUTO: 0.05 X10(3) UL (ref 0–0.2)
BASOPHILS # BLD AUTO: 0.09 X10(3) UL (ref 0–0.2)
BASOPHILS # BLD: 0 X10(3) UL (ref 0–0.2)
BASOPHILS NFR BLD AUTO: 0.1 %
BASOPHILS NFR BLD AUTO: 0.3 %
BASOPHILS NFR BLD AUTO: 0.4 %
BASOPHILS NFR BLD: 0 %
BASOPHILS NFR BRONCH: 0 %
BILIRUB SERPL-MCNC: 0.2 MG/DL (ref 0.1–2)
BILIRUB SERPL-MCNC: 0.2 MG/DL (ref 0.1–2)
BILIRUB SERPL-MCNC: 0.3 MG/DL (ref 0.1–2)
BILIRUB SERPL-MCNC: 0.4 MG/DL (ref 0.1–2)
BILIRUB SERPL-MCNC: 0.6 MG/DL (ref 0.1–2)
BILIRUB SERPL-MCNC: 0.9 MG/DL (ref 0.1–2)
BILIRUB SERPL-MCNC: 1 MG/DL (ref 0.1–2)
BILIRUB SERPL-MCNC: 1.1 MG/DL (ref 0.1–2)
BILIRUB UR QL STRIP.AUTO: NEGATIVE
BLOOD TYPE BARCODE: 1700
BLOOD TYPE BARCODE: 7300
BODY TEMPERATURE: 98.6 F
BODY TEMPERATURE: 99 F
BUN BLD-MCNC: 104 MG/DL (ref 7–18)
BUN BLD-MCNC: 11 MG/DL (ref 7–18)
BUN BLD-MCNC: 13 MG/DL (ref 7–18)
BUN BLD-MCNC: 15 MG/DL (ref 7–18)
BUN BLD-MCNC: 17 MG/DL (ref 7–18)
BUN BLD-MCNC: 22 MG/DL (ref 7–18)
BUN BLD-MCNC: 29 MG/DL (ref 7–18)
BUN BLD-MCNC: 34 MG/DL (ref 7–18)
BUN BLD-MCNC: 38 MG/DL (ref 7–18)
BUN BLD-MCNC: 44 MG/DL (ref 7–18)
BUN BLD-MCNC: 70 MG/DL (ref 7–18)
BUN BLD-MCNC: 87 MG/DL (ref 7–18)
BUN BLD-MCNC: 90 MG/DL (ref 7–18)
C PNEUM DNA SPEC QL NAA+PROBE: NOT DETECTED
CA-I BLD-SCNC: 0.84 MMOL/L (ref 1.12–1.32)
CA-I BLD-SCNC: 0.88 MMOL/L (ref 1.12–1.32)
CA-I BLD-SCNC: 0.92 MMOL/L (ref 0.95–1.32)
CA-I BLD-SCNC: 0.93 MMOL/L (ref 0.95–1.32)
CA-I BLD-SCNC: 0.94 MMOL/L (ref 1.12–1.32)
CA-I BLD-SCNC: 0.97 MMOL/L (ref 1.12–1.32)
CA-I BLD-SCNC: 0.98 MMOL/L (ref 0.95–1.32)
CA-I BLD-SCNC: 1 MMOL/L (ref 1.12–1.32)
CA-I BLD-SCNC: 1.01 MMOL/L (ref 0.95–1.32)
CA-I BLD-SCNC: 1.01 MMOL/L (ref 1.12–1.32)
CA-I BLD-SCNC: 1.02 MMOL/L (ref 1.12–1.32)
CA-I BLD-SCNC: 1.02 MMOL/L (ref 1.12–1.32)
CA-I BLD-SCNC: 1.05 MMOL/L (ref 0.95–1.32)
CA-I BLD-SCNC: 1.05 MMOL/L (ref 1.12–1.32)
CA-I BLD-SCNC: 1.06 MMOL/L (ref 1.12–1.32)
CA-I BLD-SCNC: 1.06 MMOL/L (ref 1.12–1.32)
CA-I BLD-SCNC: 1.08 MMOL/L (ref 1.12–1.32)
CA-I BLD-SCNC: 1.09 MMOL/L (ref 1.12–1.32)
CA-I BLD-SCNC: 1.09 MMOL/L (ref 1.12–1.32)
CA-I BLD-SCNC: 1.1 MMOL/L (ref 1.12–1.32)
CA-I BLD-SCNC: 1.12 MMOL/L (ref 0.95–1.32)
CA-I BLD-SCNC: 1.13 MMOL/L (ref 1.12–1.32)
CA-I BLD-SCNC: 1.18 MMOL/L (ref 1.12–1.32)
CA-I BLD-SCNC: 1.19 MMOL/L (ref 1.12–1.32)
CA-I BLD-SCNC: 1.21 MMOL/L (ref 1.12–1.32)
CA-I BLDA-SCNC: 0.93 MMOL/L (ref 1.12–1.32)
CA-I BLDA-SCNC: 0.94 MMOL/L (ref 1.12–1.32)
CA-I BLDA-SCNC: 0.96 MMOL/L (ref 1.12–1.32)
CA-I BLDA-SCNC: 0.97 MMOL/L (ref 1.12–1.32)
CA-I BLDA-SCNC: 0.99 MMOL/L (ref 1.12–1.32)
CA-I BLDA-SCNC: 1.01 MMOL/L (ref 1.12–1.32)
CA-I BLDA-SCNC: 1.03 MMOL/L (ref 1.12–1.32)
CA-I BLDA-SCNC: 1.04 MMOL/L (ref 1.12–1.32)
CA-I BLDA-SCNC: 1.06 MMOL/L (ref 1.12–1.32)
CA-I BLDA-SCNC: 1.06 MMOL/L (ref 1.12–1.32)
CA-I BLDA-SCNC: 1.07 MMOL/L (ref 1.12–1.32)
CA-I BLDA-SCNC: 1.09 MMOL/L (ref 1.12–1.32)
CA-I BLDA-SCNC: 1.1 MMOL/L (ref 1.12–1.32)
CA-I BLDA-SCNC: 1.17 MMOL/L (ref 1.12–1.32)
CA-I BLDA-SCNC: 1.18 MMOL/L (ref 1.12–1.32)
CA-I BLDA-SCNC: 1.21 MMOL/L (ref 1.12–1.32)
CALCIUM BLD-MCNC: 6.8 MG/DL (ref 8.5–10.1)
CALCIUM BLD-MCNC: 6.9 MG/DL (ref 8.5–10.1)
CALCIUM BLD-MCNC: 6.9 MG/DL (ref 8.5–10.1)
CALCIUM BLD-MCNC: 7.1 MG/DL (ref 8.5–10.1)
CALCIUM BLD-MCNC: 7.4 MG/DL (ref 8.5–10.1)
CALCIUM BLD-MCNC: 7.5 MG/DL (ref 8.5–10.1)
CALCIUM BLD-MCNC: 8.1 MG/DL (ref 8.5–10.1)
CALCIUM BLD-MCNC: 8.2 MG/DL (ref 8.5–10.1)
CALCIUM BLD-MCNC: 8.4 MG/DL (ref 8.5–10.1)
CALCIUM BLD-MCNC: 8.6 MG/DL (ref 8.5–10.1)
CALCIUM BLD-MCNC: 8.7 MG/DL (ref 8.5–10.1)
CALCIUM BLD-MCNC: 9.5 MG/DL (ref 8.5–10.1)
CHLORIDE SERPL-SCNC: 106 MMOL/L (ref 98–112)
CHLORIDE SERPL-SCNC: 109 MMOL/L (ref 98–112)
CHLORIDE SERPL-SCNC: 111 MMOL/L (ref 98–112)
CHLORIDE SERPL-SCNC: 111 MMOL/L (ref 98–112)
CHLORIDE SERPL-SCNC: 114 MMOL/L (ref 98–112)
CHLORIDE SERPL-SCNC: 114 MMOL/L (ref 98–112)
CHLORIDE SERPL-SCNC: 115 MMOL/L (ref 98–112)
CHLORIDE SERPL-SCNC: 115 MMOL/L (ref 98–112)
CHLORIDE SERPL-SCNC: 116 MMOL/L (ref 98–112)
CO2 BLD-SCNC: 18 MMOL/L (ref 22–32)
CO2 BLD-SCNC: 18 MMOL/L (ref 22–32)
CO2 BLD-SCNC: 21 MMOL/L (ref 22–32)
CO2 BLD-SCNC: 26 MMOL/L (ref 22–32)
CO2 BLD-SCNC: 26 MMOL/L (ref 22–32)
CO2 BLD-SCNC: 27 MMOL/L (ref 22–32)
CO2 BLD-SCNC: 28 MMOL/L (ref 22–32)
CO2 BLD-SCNC: 28 MMOL/L (ref 22–32)
CO2 BLD-SCNC: 29 MMOL/L (ref 22–32)
CO2 BLD-SCNC: 29 MMOL/L (ref 22–32)
CO2 BLD-SCNC: 30 MMOL/L (ref 22–32)
CO2 BLD-SCNC: 31 MMOL/L (ref 22–32)
CO2 BLD-SCNC: 32 MMOL/L (ref 22–32)
CO2 BLD-SCNC: 34 MMOL/L (ref 22–32)
CO2 BLD-SCNC: 34 MMOL/L (ref 22–32)
CO2 BLD-SCNC: 35 MMOL/L (ref 22–32)
CO2 BLD-SCNC: 37 MMOL/L (ref 22–32)
CO2 BLD-SCNC: 39 MMOL/L (ref 22–32)
CO2 BLDA-SCNC: 28 MMOL/L (ref 22–32)
CO2 BLDA-SCNC: 29 MMOL/L (ref 22–32)
CO2 BLDA-SCNC: 30 MMOL/L (ref 22–32)
CO2 BLDA-SCNC: 31 MMOL/L (ref 22–32)
CO2 BLDA-SCNC: 32 MMOL/L (ref 22–32)
CO2 BLDA-SCNC: 33 MMOL/L (ref 22–32)
CO2 SERPL-SCNC: 16 MMOL/L (ref 21–32)
CO2 SERPL-SCNC: 26 MMOL/L (ref 21–32)
CO2 SERPL-SCNC: 27 MMOL/L (ref 21–32)
CO2 SERPL-SCNC: 28 MMOL/L (ref 21–32)
CO2 SERPL-SCNC: 28 MMOL/L (ref 21–32)
CO2 SERPL-SCNC: 29 MMOL/L (ref 21–32)
CO2 SERPL-SCNC: 29 MMOL/L (ref 21–32)
CO2 SERPL-SCNC: 30 MMOL/L (ref 21–32)
COHGB MFR BLD: 1.1 % SAT (ref 0–3)
COHGB MFR BLD: 1.9 % SAT (ref 0–3)
COHGB MFR BLD: 1.9 % SAT (ref 0–3)
COHGB MFR BLD: 2 % SAT (ref 0–3)
COHGB MFR BLD: 2 % SAT (ref 0–3)
COHGB MFR BLD: 2.3 % SAT (ref 0–3)
COHGB MFR BLD: 2.5 % SAT (ref 0–3)
COHGB MFR BLD: 2.9 % SAT (ref 0–3)
COLOR UR AUTO: YELLOW
CORONAVIRUS 229E PCR:: NOT DETECTED
CORONAVIRUS HKU1 PCR:: NOT DETECTED
CORONAVIRUS NL63 PCR:: NOT DETECTED
CORONAVIRUS OC43 PCR:: NOT DETECTED
CREAT BLD-MCNC: 0.66 MG/DL
CREAT BLD-MCNC: 1.11 MG/DL
CREAT BLD-MCNC: 1.38 MG/DL
CREAT BLD-MCNC: 1.52 MG/DL
CREAT BLD-MCNC: 1.98 MG/DL
CREAT BLD-MCNC: 2.5 MG/DL
CREAT BLD-MCNC: 2.82 MG/DL
CREAT BLD-MCNC: 3.3 MG/DL
CREAT BLD-MCNC: 3.98 MG/DL
CREAT BLD-MCNC: 5.29 MG/DL
CREAT BLD-MCNC: 6.77 MG/DL
CREAT BLD-MCNC: 7.35 MG/DL
D DIMER PPP FEU-MCNC: <0.27 UG/ML FEU (ref ?–0.64)
EOSINOPHIL # BLD AUTO: 0 X10(3) UL (ref 0–0.7)
EOSINOPHIL # BLD AUTO: 0.08 X10(3) UL (ref 0–0.7)
EOSINOPHIL # BLD AUTO: 0.1 X10(3) UL (ref 0–0.7)
EOSINOPHIL # BLD AUTO: 0.1 X10(3) UL (ref 0–0.7)
EOSINOPHIL # BLD: 0 X10(3) UL (ref 0–0.7)
EOSINOPHIL NFR BLD AUTO: 0 %
EOSINOPHIL NFR BLD AUTO: 0.9 %
EOSINOPHIL NFR BLD AUTO: 1 %
EOSINOPHIL NFR BLD AUTO: 1.2 %
EOSINOPHIL NFR BLD: 0 %
EOSINOPHIL NFR BRONCH: 0 %
ERYTHROCYTE [DISTWIDTH] IN BLOOD BY AUTOMATED COUNT: 14 %
ERYTHROCYTE [DISTWIDTH] IN BLOOD BY AUTOMATED COUNT: 14.3 %
ERYTHROCYTE [DISTWIDTH] IN BLOOD BY AUTOMATED COUNT: 14.4 %
ERYTHROCYTE [DISTWIDTH] IN BLOOD BY AUTOMATED COUNT: 14.7 %
ERYTHROCYTE [DISTWIDTH] IN BLOOD BY AUTOMATED COUNT: 14.8 %
ERYTHROCYTE [DISTWIDTH] IN BLOOD BY AUTOMATED COUNT: 14.9 %
ERYTHROCYTE [DISTWIDTH] IN BLOOD BY AUTOMATED COUNT: 15.2 %
ERYTHROCYTE [DISTWIDTH] IN BLOOD BY AUTOMATED COUNT: 15.4 %
ERYTHROCYTE [DISTWIDTH] IN BLOOD BY AUTOMATED COUNT: 15.5 %
ERYTHROCYTE [DISTWIDTH] IN BLOOD BY AUTOMATED COUNT: 15.6 %
ERYTHROCYTE [DISTWIDTH] IN BLOOD BY AUTOMATED COUNT: 15.7 %
ERYTHROCYTE [DISTWIDTH] IN BLOOD BY AUTOMATED COUNT: 15.7 %
ERYTHROCYTE [DISTWIDTH] IN BLOOD BY AUTOMATED COUNT: 15.8 %
ERYTHROCYTE [DISTWIDTH] IN BLOOD BY AUTOMATED COUNT: 16 %
EST. AVERAGE GLUCOSE BLD GHB EST-MCNC: 126 MG/DL (ref 68–126)
FIO2: 100 %
FIO2: 90 %
FLUAV + FLUBV RNA SPEC NAA+PROBE: NEGATIVE
FLUAV + FLUBV RNA SPEC NAA+PROBE: NEGATIVE
FLUAV RNA SPEC QL NAA+PROBE: NOT DETECTED
FLUBV RNA SPEC QL NAA+PROBE: NOT DETECTED
GLOBULIN PLAS-MCNC: 1.7 G/DL (ref 2.8–4.4)
GLOBULIN PLAS-MCNC: 2.3 G/DL (ref 2.8–4.4)
GLOBULIN PLAS-MCNC: 2.5 G/DL (ref 2.8–4.4)
GLOBULIN PLAS-MCNC: 2.7 G/DL (ref 2.8–4.4)
GLOBULIN PLAS-MCNC: 2.9 G/DL (ref 2.8–4.4)
GLOBULIN PLAS-MCNC: 3 G/DL (ref 2.8–4.4)
GLOBULIN PLAS-MCNC: 3.4 G/DL (ref 2.8–4.4)
GLOBULIN PLAS-MCNC: 3.6 G/DL (ref 2.8–4.4)
GLUCOSE BLD-MCNC: 105 MG/DL (ref 70–99)
GLUCOSE BLD-MCNC: 109 MG/DL (ref 70–99)
GLUCOSE BLD-MCNC: 114 MG/DL (ref 70–99)
GLUCOSE BLD-MCNC: 120 MG/DL (ref 70–99)
GLUCOSE BLD-MCNC: 122 MG/DL (ref 70–99)
GLUCOSE BLD-MCNC: 123 MG/DL (ref 70–99)
GLUCOSE BLD-MCNC: 129 MG/DL (ref 70–99)
GLUCOSE BLD-MCNC: 131 MG/DL (ref 70–99)
GLUCOSE BLD-MCNC: 131 MG/DL (ref 70–99)
GLUCOSE BLD-MCNC: 132 MG/DL (ref 70–99)
GLUCOSE BLD-MCNC: 133 MG/DL (ref 70–99)
GLUCOSE BLD-MCNC: 135 MG/DL (ref 70–99)
GLUCOSE BLD-MCNC: 136 MG/DL (ref 70–99)
GLUCOSE BLD-MCNC: 137 MG/DL (ref 70–99)
GLUCOSE BLD-MCNC: 144 MG/DL (ref 70–99)
GLUCOSE BLD-MCNC: 146 MG/DL (ref 70–99)
GLUCOSE BLD-MCNC: 147 MG/DL (ref 70–99)
GLUCOSE BLD-MCNC: 148 MG/DL (ref 70–99)
GLUCOSE BLD-MCNC: 149 MG/DL (ref 70–99)
GLUCOSE BLD-MCNC: 149 MG/DL (ref 70–99)
GLUCOSE BLD-MCNC: 150 MG/DL (ref 70–99)
GLUCOSE BLD-MCNC: 152 MG/DL (ref 70–99)
GLUCOSE BLD-MCNC: 154 MG/DL (ref 70–99)
GLUCOSE BLD-MCNC: 157 MG/DL (ref 70–99)
GLUCOSE BLD-MCNC: 163 MG/DL (ref 70–99)
GLUCOSE BLD-MCNC: 163 MG/DL (ref 70–99)
GLUCOSE BLD-MCNC: 175 MG/DL (ref 70–99)
GLUCOSE BLD-MCNC: 178 MG/DL (ref 70–99)
GLUCOSE BLD-MCNC: 178 MG/DL (ref 70–99)
GLUCOSE BLD-MCNC: 182 MG/DL (ref 70–99)
GLUCOSE BLD-MCNC: 186 MG/DL (ref 70–99)
GLUCOSE BLD-MCNC: 194 MG/DL (ref 70–99)
GLUCOSE BLD-MCNC: 200 MG/DL (ref 70–99)
GLUCOSE BLD-MCNC: 200 MG/DL (ref 70–99)
GLUCOSE BLD-MCNC: 201 MG/DL (ref 70–99)
GLUCOSE BLD-MCNC: 202 MG/DL (ref 70–99)
GLUCOSE BLD-MCNC: 221 MG/DL (ref 70–99)
GLUCOSE BLD-MCNC: 245 MG/DL (ref 70–99)
GLUCOSE BLD-MCNC: 82 MG/DL (ref 70–99)
GLUCOSE BLD-MCNC: 86 MG/DL (ref 70–99)
GLUCOSE BLD-MCNC: 91 MG/DL (ref 70–99)
GLUCOSE UR STRIP.AUTO-MCNC: 50 MG/DL
HBA1C MFR BLD: 6 % (ref ?–5.7)
HCO3 BLD-SCNC: 17.3 MEQ/L
HCO3 BLD-SCNC: 17.6 MEQ/L
HCO3 BLD-SCNC: 23.9 MEQ/L
HCO3 BLD-SCNC: 24.2 MEQ/L
HCO3 BLD-SCNC: 25.5 MEQ/L
HCO3 BLD-SCNC: 25.9 MEQ/L
HCO3 BLD-SCNC: 27.1 MEQ/L
HCO3 BLD-SCNC: 28.1 MEQ/L
HCO3 BLD-SCNC: 28.8 MEQ/L
HCO3 BLD-SCNC: 28.9 MEQ/L
HCO3 BLD-SCNC: 29.1 MEQ/L
HCO3 BLD-SCNC: 29.4 MEQ/L
HCO3 BLD-SCNC: 29.5 MEQ/L
HCO3 BLD-SCNC: 30 MEQ/L
HCO3 BLD-SCNC: 30.3 MEQ/L
HCO3 BLD-SCNC: 30.8 MEQ/L
HCO3 BLD-SCNC: 30.8 MEQ/L
HCO3 BLD-SCNC: 31.9 MEQ/L
HCO3 BLD-SCNC: 32.8 MEQ/L
HCO3 BLD-SCNC: 34 MEQ/L
HCO3 BLD-SCNC: 35.5 MEQ/L
HCO3 BLDA-SCNC: 22.9 MEQ/L (ref 21–27)
HCO3 BLDA-SCNC: 25.6 MEQ/L (ref 22–26)
HCO3 BLDA-SCNC: 26 MEQ/L (ref 22–26)
HCO3 BLDA-SCNC: 26.2 MEQ/L (ref 21–27)
HCO3 BLDA-SCNC: 26.3 MEQ/L (ref 22–26)
HCO3 BLDA-SCNC: 26.9 MEQ/L (ref 21–27)
HCO3 BLDA-SCNC: 27 MEQ/L (ref 21–27)
HCO3 BLDA-SCNC: 27.2 MEQ/L (ref 22–26)
HCO3 BLDA-SCNC: 27.9 MEQ/L (ref 22–26)
HCO3 BLDA-SCNC: 28.1 MEQ/L (ref 22–26)
HCO3 BLDA-SCNC: 28.1 MEQ/L (ref 22–26)
HCO3 BLDA-SCNC: 28.6 MEQ/L (ref 21–27)
HCO3 BLDA-SCNC: 28.7 MEQ/L (ref 22–26)
HCO3 BLDA-SCNC: 28.9 MEQ/L (ref 22–26)
HCO3 BLDA-SCNC: 29.7 MEQ/L (ref 22–26)
HCO3 BLDA-SCNC: 29.7 MEQ/L (ref 22–26)
HCO3 BLDA-SCNC: 30 MEQ/L (ref 22–26)
HCO3 BLDA-SCNC: 30.2 MEQ/L (ref 22–26)
HCO3 BLDA-SCNC: 30.3 MEQ/L (ref 22–26)
HCO3 BLDA-SCNC: 30.9 MEQ/L (ref 22–26)
HCO3 BLDA-SCNC: 31.6 MEQ/L (ref 22–26)
HCT VFR BLD AUTO: 23.1 %
HCT VFR BLD AUTO: 26 %
HCT VFR BLD AUTO: 28.8 %
HCT VFR BLD AUTO: 29.6 %
HCT VFR BLD AUTO: 30.1 %
HCT VFR BLD AUTO: 30.6 %
HCT VFR BLD AUTO: 30.9 %
HCT VFR BLD AUTO: 32.1 %
HCT VFR BLD AUTO: 34.2 %
HCT VFR BLD AUTO: 35.5 %
HCT VFR BLD AUTO: 40.8 %
HCT VFR BLD AUTO: 42.3 %
HCT VFR BLD AUTO: 43.5 %
HCT VFR BLD AUTO: 44.9 %
HCT VFR BLD CALC: 15 %
HCT VFR BLD CALC: 22 %
HCT VFR BLD CALC: 22 %
HCT VFR BLD CALC: 26 %
HCT VFR BLD CALC: 27 %
HCT VFR BLD CALC: 27 %
HCT VFR BLD CALC: 28 %
HCT VFR BLD CALC: 29 %
HCT VFR BLD CALC: 29 %
HCT VFR BLD CALC: 30 %
HCT VFR BLD CALC: 30 %
HCT VFR BLD CALC: 32 %
HCT VFR BLD CALC: 32 %
HCT VFR BLD CALC: 33 %
HCT VFR BLD CALC: 33 %
HCT VFR BLD CALC: 34 %
HCT VFR BLD CALC: 34 %
HCT VFR BLD CALC: 37 %
HCT VFR BLD CALC: 38 %
HCT VFR BLD CALC: 40 %
HCT VFR BLD CALC: 42 %
HCT VFR BLDA CALC: 28 %
HCT VFR BLDA CALC: 29 %
HCT VFR BLDA CALC: 30 %
HCT VFR BLDA CALC: 30 %
HCT VFR BLDA CALC: 31 %
HCT VFR BLDA CALC: 32 %
HCT VFR BLDA CALC: 33 %
HCT VFR BLDA CALC: 34 %
HCT VFR BLDA CALC: 38 %
HCT VFR BLDA CALC: 41 %
HEPARIN AB PPP QL PL AGG: NEGATIVE
HGB BLD-MCNC: 10.1 G/DL
HGB BLD-MCNC: 10.6 G/DL
HGB BLD-MCNC: 11.1 G/DL
HGB BLD-MCNC: 11.2 G/DL
HGB BLD-MCNC: 11.4 G/DL
HGB BLD-MCNC: 11.4 G/DL
HGB BLD-MCNC: 11.5 G/DL
HGB BLD-MCNC: 11.9 G/DL
HGB BLD-MCNC: 12.1 G/DL
HGB BLD-MCNC: 12.6 G/DL
HGB BLD-MCNC: 13.2 G/DL
HGB BLD-MCNC: 13.4 G/DL
HGB BLD-MCNC: 13.5 G/DL
HGB BLD-MCNC: 13.8 G/DL
HGB BLD-MCNC: 13.9 G/DL
HGB BLD-MCNC: 7.7 G/DL
HGB BLD-MCNC: 8.5 G/DL
HGB BLD-MCNC: 9.5 G/DL
HGB BLD-MCNC: 9.8 G/DL
HGB BLD-MCNC: 9.9 G/DL
IMM GRANULOCYTES # BLD AUTO: 0.02 X10(3) UL (ref 0–1)
IMM GRANULOCYTES # BLD AUTO: 0.1 X10(3) UL (ref 0–1)
IMM GRANULOCYTES # BLD AUTO: 0.11 X10(3) UL (ref 0–1)
IMM GRANULOCYTES # BLD AUTO: 0.14 X10(3) UL (ref 0–1)
IMM GRANULOCYTES # BLD AUTO: 0.36 X10(3) UL (ref 0–1)
IMM GRANULOCYTES # BLD AUTO: 0.49 X10(3) UL (ref 0–1)
IMM GRANULOCYTES NFR BLD: 0.3 %
IMM GRANULOCYTES NFR BLD: 0.6 %
IMM GRANULOCYTES NFR BLD: 1 %
IMM GRANULOCYTES NFR BLD: 1.1 %
IMM GRANULOCYTES NFR BLD: 1.3 %
IMM GRANULOCYTES NFR BLD: 4.3 %
INR BLD: 0.97 (ref 0.8–1.2)
INR BLD: 1.34 (ref 0.8–1.2)
INR BLD: 1.54 (ref 0.8–1.2)
INR BLD: 1.71 (ref 0.8–1.2)
ISTAT ACTIVATED CLOTTING TIME: 112 SECONDS (ref 74–137)
ISTAT ACTIVATED CLOTTING TIME: 112 SECONDS (ref 74–137)
ISTAT ACTIVATED CLOTTING TIME: 124 SECONDS (ref 74–137)
ISTAT ACTIVATED CLOTTING TIME: 130 SECONDS (ref 74–137)
ISTAT ACTIVATED CLOTTING TIME: 136 SECONDS (ref 74–137)
ISTAT ACTIVATED CLOTTING TIME: 142 SECONDS (ref 74–137)
ISTAT ACTIVATED CLOTTING TIME: 148 SECONDS (ref 74–137)
ISTAT ACTIVATED CLOTTING TIME: 154 SECONDS (ref 74–137)
ISTAT ACTIVATED CLOTTING TIME: 166 SECONDS (ref 74–137)
ISTAT ACTIVATED CLOTTING TIME: 184 SECONDS (ref 74–137)
ISTAT ACTIVATED CLOTTING TIME: 190 SECONDS (ref 74–137)
ISTAT ACTIVATED CLOTTING TIME: 190 SECONDS (ref 74–137)
ISTAT ACTIVATED CLOTTING TIME: 208 SECONDS (ref 74–137)
ISTAT ACTIVATED CLOTTING TIME: 261 SECONDS (ref 74–137)
ISTAT BLOOD GAS FIO2: 100 %
ISTAT BLOOD GAS FIO2: 80 %
ISTAT PATIENT TEMPERATURE: 35.1 DEGREE
ISTAT PATIENT TEMPERATURE: 36 DEGREE
ISTAT PATIENT TEMPERATURE: 36.4 DEGREE
ISTAT PATIENT TEMPERATURE: 36.9 DEGREE
ISTAT PATIENT TEMPERATURE: 37 DEGREE
ISTAT PATIENT TEMPERATURE: 37.1 DEGREE
ISTAT PATIENT TEMPERATURE: 37.1 DEGREE
ISTAT PATIENT TEMPERATURE: 37.2 DEGREE
ISTAT PATIENT TEMPERATURE: 37.4 DEGREE
ISTAT PATIENT TEMPERATURE: 37.4 DEGREE
KETONES UR STRIP.AUTO-MCNC: NEGATIVE MG/DL
L/M: 15 L/MIN
LACTATE BLD-SCNC: 1.7 MMOL/L (ref 0.5–2)
LACTATE BLD-SCNC: 1.8 MMOL/L (ref 0.5–2)
LACTATE BLD-SCNC: 1.9 MMOL/L (ref 0.5–2)
LACTATE BLD-SCNC: 10.1 MMOL/L (ref 0.5–2)
LACTATE BLD-SCNC: 11.5 MMOL/L (ref 0.5–2)
LACTATE BLD-SCNC: 15.2 MMOL/L (ref 0.5–2)
LACTATE BLD-SCNC: 3.6 MMOL/L (ref 0.5–2)
LACTATE BLD-SCNC: 8.1 MMOL/L (ref 0.5–2)
LACTATE SERPL-SCNC: 12.8 MMOL/L (ref 0.4–2)
LACTATE SERPL-SCNC: 3.3 MMOL/L (ref 0.4–2)
LACTATE SERPL-SCNC: 5.4 MMOL/L (ref 0.4–2)
LACTATE SERPL-SCNC: 6.5 MMOL/L (ref 0.4–2)
LACTATE SERPL-SCNC: 8.6 MMOL/L (ref 0.4–2)
LACTATE SERPL-SCNC: 8.9 MMOL/L (ref 0.4–2)
LEUKOCYTE ESTERASE UR QL STRIP.AUTO: NEGATIVE
LYMPHOCYTES # BLD AUTO: 0.38 X10(3) UL (ref 1–4)
LYMPHOCYTES # BLD AUTO: 0.6 X10(3) UL (ref 1–4)
LYMPHOCYTES # BLD AUTO: 1.09 X10(3) UL (ref 1–4)
LYMPHOCYTES # BLD AUTO: 2.59 X10(3) UL (ref 1–4)
LYMPHOCYTES # BLD AUTO: 5.62 X10(3) UL (ref 1–4)
LYMPHOCYTES NFR BLD AUTO: 2.2 %
LYMPHOCYTES NFR BLD AUTO: 2.6 %
LYMPHOCYTES NFR BLD AUTO: 22.7 %
LYMPHOCYTES NFR BLD AUTO: 37 %
LYMPHOCYTES NFR BLD AUTO: 53.9 %
LYMPHOCYTES NFR BLD AUTO: 6.8 %
LYMPHOCYTES NFR BLD: 0.6 X10(3) UL (ref 1–4)
LYMPHOCYTES NFR BLD: 1.13 X10(3) UL (ref 1–4)
LYMPHOCYTES NFR BLD: 1.55 X10(3) UL (ref 1–4)
LYMPHOCYTES NFR BLD: 1.75 X10(3) UL (ref 1–4)
LYMPHOCYTES NFR BLD: 2.28 X10(3) UL (ref 1–4)
LYMPHOCYTES NFR BLD: 5 %
LYMPHOCYTES NFR BLD: 5 %
LYMPHOCYTES NFR BLD: 6 %
LYMPHOCYTES NFR BLD: 7 %
LYMPHOCYTES NFR BRONCH: 0 %
MAGNESIUM SERPL-MCNC: 2.3 MG/DL (ref 1.6–2.6)
MAGNESIUM SERPL-MCNC: 2.3 MG/DL (ref 1.6–2.6)
MAGNESIUM SERPL-MCNC: 2.6 MG/DL (ref 1.6–2.6)
MAGNESIUM SERPL-MCNC: 2.9 MG/DL (ref 1.6–2.6)
MAGNESIUM SERPL-MCNC: 3.2 MG/DL (ref 1.6–2.6)
MCH RBC QN AUTO: 29.2 PG (ref 26–34)
MCH RBC QN AUTO: 29.5 PG (ref 26–34)
MCH RBC QN AUTO: 29.5 PG (ref 26–34)
MCH RBC QN AUTO: 29.7 PG (ref 26–34)
MCH RBC QN AUTO: 29.7 PG (ref 26–34)
MCH RBC QN AUTO: 29.8 PG (ref 26–34)
MCH RBC QN AUTO: 29.8 PG (ref 26–34)
MCH RBC QN AUTO: 29.9 PG (ref 26–34)
MCH RBC QN AUTO: 30 PG (ref 26–34)
MCH RBC QN AUTO: 30.1 PG (ref 26–34)
MCH RBC QN AUTO: 30.3 PG (ref 26–34)
MCH RBC QN AUTO: 30.6 PG (ref 26–34)
MCHC RBC AUTO-ENTMCNC: 29.8 G/DL (ref 31–37)
MCHC RBC AUTO-ENTMCNC: 31 G/DL (ref 31–37)
MCHC RBC AUTO-ENTMCNC: 31.3 G/DL (ref 31–37)
MCHC RBC AUTO-ENTMCNC: 32 G/DL (ref 31–37)
MCHC RBC AUTO-ENTMCNC: 32.4 G/DL (ref 31–37)
MCHC RBC AUTO-ENTMCNC: 32.5 G/DL (ref 31–37)
MCHC RBC AUTO-ENTMCNC: 32.7 G/DL (ref 31–37)
MCHC RBC AUTO-ENTMCNC: 32.9 G/DL (ref 31–37)
MCHC RBC AUTO-ENTMCNC: 33 G/DL (ref 31–37)
MCHC RBC AUTO-ENTMCNC: 33 G/DL (ref 31–37)
MCHC RBC AUTO-ENTMCNC: 33.1 G/DL (ref 31–37)
MCHC RBC AUTO-ENTMCNC: 33.3 G/DL (ref 31–37)
MCHC RBC AUTO-ENTMCNC: 34.9 G/DL (ref 31–37)
MCHC RBC AUTO-ENTMCNC: 35.2 G/DL (ref 31–37)
MCV RBC AUTO: 85.8 FL
MCV RBC AUTO: 87 FL
MCV RBC AUTO: 90 FL
MCV RBC AUTO: 90 FL
MCV RBC AUTO: 90.2 FL
MCV RBC AUTO: 90.9 FL
MCV RBC AUTO: 90.9 FL
MCV RBC AUTO: 91 FL
MCV RBC AUTO: 91.2 FL
MCV RBC AUTO: 92.4 FL
MCV RBC AUTO: 92.5 FL
MCV RBC AUTO: 95.4 FL
MCV RBC AUTO: 96.2 FL
MCV RBC AUTO: 98.7 FL
METAMYELOCYTES # BLD: 0.23 X10(3) UL
METAMYELOCYTES # BLD: 0.5 X10(3) UL
METAMYELOCYTES NFR BLD: 1 %
METAMYELOCYTES NFR BLD: 2 %
METAPNEUMOVIRUS PCR:: NOT DETECTED
METHGB MFR BLD: 0.3 % SAT (ref 0.4–1.5)
METHGB MFR BLD: 0.4 % SAT (ref 0.4–1.5)
METHGB MFR BLD: 1 % SAT (ref 0.4–1.5)
METHGB MFR BLD: 1.3 % SAT (ref 0.4–1.5)
METHGB MFR BLD: 1.3 % SAT (ref 0.4–1.5)
METHGB MFR BLD: 1.5 % SAT (ref 0.4–1.5)
METHGB MFR BLD: 1.6 % SAT (ref 0.4–1.5)
METHGB MFR BLD: 1.9 % SAT (ref 0.4–1.5)
MONOCYTES # BLD AUTO: 0.15 X10(3) UL (ref 0.1–1)
MONOCYTES # BLD AUTO: 0.51 X10(3) UL (ref 0.1–1)
MONOCYTES # BLD AUTO: 0.58 X10(3) UL (ref 0.1–1)
MONOCYTES # BLD AUTO: 0.77 X10(3) UL (ref 0.1–1)
MONOCYTES # BLD AUTO: 0.91 X10(3) UL (ref 0.1–1)
MONOCYTES # BLD: 0 X10(3) UL (ref 0.1–1)
MONOCYTES # BLD: 0.36 X10(3) UL (ref 0.1–1)
MONOCYTES # BLD: 0.75 X10(3) UL (ref 0.1–1)
MONOCYTES # BLD: 0.76 X10(3) UL (ref 0.1–1)
MONOCYTES # BLD: 1.13 X10(3) UL (ref 0.1–1)
MONOCYTES NFR BLD AUTO: 1.3 %
MONOCYTES NFR BLD AUTO: 11.3 %
MONOCYTES NFR BLD AUTO: 2.5 %
MONOCYTES NFR BLD AUTO: 3.5 %
MONOCYTES NFR BLD AUTO: 3.6 %
MONOCYTES NFR BLD AUTO: 8.7 %
MONOCYTES NFR BLD: 0 %
MONOCYTES NFR BLD: 3 %
MONOCYTES NFR BLD: 3 %
MONOCYTES NFR BLD: 4 %
MONOCYTES NFR BLD: 5 %
MONOS+MACROS NFR BRONCH: 5 %
MORPHOLOGY: NORMAL
MYCOPLASMA PNEUMONIA PCR:: NOT DETECTED
MYELOCYTES # BLD: 0.25 X10(3) UL
MYELOCYTES # BLD: 0.68 X10(3) UL
MYELOCYTES NFR BLD: 1 %
MYELOCYTES NFR BLD: 3 %
NEUTROPHILS # BLD AUTO: 10.15 X10 (3) UL (ref 1.5–7.7)
NEUTROPHILS # BLD AUTO: 13.66 X10 (3) UL (ref 1.5–7.7)
NEUTROPHILS # BLD AUTO: 13.66 X10(3) UL (ref 1.5–7.7)
NEUTROPHILS # BLD AUTO: 14.24 X10 (3) UL (ref 1.5–7.7)
NEUTROPHILS # BLD AUTO: 14.24 X10(3) UL (ref 1.5–7.7)
NEUTROPHILS # BLD AUTO: 15.96 X10 (3) UL (ref 1.5–7.7)
NEUTROPHILS # BLD AUTO: 16.33 X10 (3) UL (ref 1.5–7.7)
NEUTROPHILS # BLD AUTO: 21.8 X10 (3) UL (ref 1.5–7.7)
NEUTROPHILS # BLD AUTO: 25.42 X10 (3) UL (ref 1.5–7.7)
NEUTROPHILS # BLD AUTO: 25.42 X10(3) UL (ref 1.5–7.7)
NEUTROPHILS # BLD AUTO: 3.42 X10 (3) UL (ref 1.5–7.7)
NEUTROPHILS # BLD AUTO: 3.42 X10(3) UL (ref 1.5–7.7)
NEUTROPHILS # BLD AUTO: 3.65 X10 (3) UL (ref 1.5–7.7)
NEUTROPHILS # BLD AUTO: 3.65 X10(3) UL (ref 1.5–7.7)
NEUTROPHILS # BLD AUTO: 8.05 X10 (3) UL (ref 1.5–7.7)
NEUTROPHILS # BLD AUTO: 8.05 X10(3) UL (ref 1.5–7.7)
NEUTROPHILS NFR BLD AUTO: 35 %
NEUTROPHILS NFR BLD AUTO: 49.9 %
NEUTROPHILS NFR BLD AUTO: 70.4 %
NEUTROPHILS NFR BLD AUTO: 88.9 %
NEUTROPHILS NFR BLD AUTO: 92.6 %
NEUTROPHILS NFR BLD AUTO: 93.7 %
NEUTROPHILS NFR BLD: 76 %
NEUTROPHILS NFR BLD: 82 %
NEUTROPHILS NFR BLD: 84 %
NEUTROPHILS NFR BLD: 86 %
NEUTROPHILS NFR BLD: 86 %
NEUTROPHILS NFR BRONCH: 95 %
NEUTS BAND NFR BLD: 1 %
NEUTS BAND NFR BLD: 10 %
NEUTS BAND NFR BLD: 10 %
NEUTS BAND NFR BLD: 8 %
NEUTS HYPERSEG # BLD: 11.04 X10(3) UL (ref 1.5–7.7)
NEUTS HYPERSEG # BLD: 15.96 X10(3) UL (ref 1.5–7.7)
NEUTS HYPERSEG # BLD: 19.44 X10(3) UL (ref 1.5–7.7)
NEUTS HYPERSEG # BLD: 21.75 X10(3) UL (ref 1.5–7.7)
NEUTS HYPERSEG # BLD: 24.25 X10(3) UL (ref 1.5–7.7)
NITRITE UR QL STRIP.AUTO: NEGATIVE
NRBC BLD MANUAL-RTO: 1 %
NRBC BLD MANUAL-RTO: 17 %
NRBC BLD MANUAL-RTO: 39 %
NT-PROBNP SERPL-MCNC: 133 PG/ML (ref ?–125)
OSMOLALITY SERPL CALC.SUM OF ELEC: 295 MOSM/KG (ref 275–295)
OSMOLALITY SERPL CALC.SUM OF ELEC: 298 MOSM/KG (ref 275–295)
OSMOLALITY SERPL CALC.SUM OF ELEC: 300 MOSM/KG (ref 275–295)
OSMOLALITY SERPL CALC.SUM OF ELEC: 320 MOSM/KG (ref 275–295)
OSMOLALITY SERPL CALC.SUM OF ELEC: 320 MOSM/KG (ref 275–295)
OSMOLALITY SERPL CALC.SUM OF ELEC: 324 MOSM/KG (ref 275–295)
OSMOLALITY SERPL CALC.SUM OF ELEC: 324 MOSM/KG (ref 275–295)
OSMOLALITY SERPL CALC.SUM OF ELEC: 325 MOSM/KG (ref 275–295)
OSMOLALITY SERPL CALC.SUM OF ELEC: 325 MOSM/KG (ref 275–295)
OSMOLALITY SERPL CALC.SUM OF ELEC: 328 MOSM/KG (ref 275–295)
OSMOLALITY SERPL CALC.SUM OF ELEC: 342 MOSM/KG (ref 275–295)
OSMOLALITY SERPL CALC.SUM OF ELEC: 343 MOSM/KG (ref 275–295)
OSMOLALITY SERPL CALC.SUM OF ELEC: 346 MOSM/KG (ref 275–295)
OXYHGB MFR BLDA: 85 % (ref 92–100)
OXYHGB MFR BLDA: 96.5 % (ref 92–100)
OXYHGB MFR BLDA: 96.6 % (ref 92–100)
OXYHGB MFR BLDA: 96.8 % (ref 92–100)
OXYHGB MFR BLDA: 96.8 % (ref 92–100)
OXYHGB MFR BLDA: 97 % (ref 92–100)
OXYHGB MFR BLDA: 97.2 % (ref 92–100)
P AXIS: 67 DEGREES
P AXIS: 68 DEGREES
P AXIS: 77 DEGREES
P-R INTERVAL: 146 MS
P-R INTERVAL: 158 MS
P/F RATIO: 193 MMHG
P/F RATIO: 364 MMHG
P/F RATIO: 425 MMHG
PARAINFLUENZA 1 PCR:: NOT DETECTED
PARAINFLUENZA 2 PCR:: NOT DETECTED
PARAINFLUENZA 3 PCR:: NOT DETECTED
PARAINFLUENZA 4 PCR:: NOT DETECTED
PAW @ PEAK INSP FLOW SETTING VENT: 22.5 CM H2O
PAW @ PEAK INSP FLOW SETTING VENT: 44 CM H2O
PCO2 BLD: 117 MMHG
PCO2 BLD: 129.6 MMHG
PCO2 BLD: 19.1 MMHG
PCO2 BLD: 23.5 MMHG
PCO2 BLD: 30.8 MMHG
PCO2 BLD: 37.7 MMHG
PCO2 BLD: 41.2 MMHG
PCO2 BLD: 42.4 MMHG
PCO2 BLD: 45.8 MMHG
PCO2 BLD: 47.8 MMHG
PCO2 BLD: 53.9 MMHG
PCO2 BLD: 55 MMHG
PCO2 BLD: 59.5 MMHG
PCO2 BLD: 61.3 MMHG
PCO2 BLD: 62.3 MMHG
PCO2 BLD: 64 MMHG
PCO2 BLD: 66.2 MMHG
PCO2 BLD: 66.8 MMHG
PCO2 BLD: 70.4 MMHG
PCO2 BLD: 73.8 MMHG
PCO2 BLD: 78.8 MMHG
PCO2 BLD: 91.6 MMHG
PCO2 BLDA: 35 MM HG (ref 35–45)
PCO2 BLDA: 36 MM HG (ref 35–45)
PCO2 BLDA: 39.8 MMHG (ref 35–45)
PCO2 BLDA: 41 MM HG (ref 35–45)
PCO2 BLDA: 41.3 MMHG (ref 35–45)
PCO2 BLDA: 42 MMHG (ref 35–45)
PCO2 BLDA: 44.1 MMHG (ref 35–45)
PCO2 BLDA: 49.3 MMHG (ref 35–45)
PCO2 BLDA: 49.9 MMHG (ref 35–45)
PCO2 BLDA: 50.3 MMHG (ref 35–45)
PCO2 BLDA: 50.7 MMHG (ref 35–45)
PCO2 BLDA: 51.1 MMHG (ref 35–45)
PCO2 BLDA: 51.6 MMHG (ref 35–45)
PCO2 BLDA: 55.6 MMHG (ref 35–45)
PCO2 BLDA: 57.4 MMHG (ref 35–45)
PCO2 BLDA: 58.9 MMHG (ref 35–45)
PCO2 BLDA: 61.7 MMHG (ref 35–45)
PCO2 BLDA: 66 MM HG (ref 35–45)
PCO2 BLDA: 76.5 MMHG (ref 35–45)
PCO2 BLDA: 76.5 MMHG (ref 35–45)
PCO2 BLDA: 96 MM HG (ref 35–45)
PCO2 BLDA: >127 MM HG (ref 35–45)
PEEP: 5 CM H2O
PEEP: 7 CM H2O
PEEP: 7.5 CM H2O
PH BLD: 7.01 [PH]
PH BLD: 7.09 [PH]
PH BLD: 7.13 [PH]
PH BLD: 7.17 [PH]
PH BLD: 7.19 [PH]
PH BLD: 7.2 [PH]
PH BLD: 7.24 [PH]
PH BLD: 7.26 [PH]
PH BLD: 7.27 [PH]
PH BLD: 7.3 [PH]
PH BLD: 7.3 [PH]
PH BLD: 7.32 [PH]
PH BLD: 7.33 [PH]
PH BLD: 7.34 [PH]
PH BLD: 7.35 [PH]
PH BLD: 7.4 [PH]
PH BLD: 7.44 [PH]
PH BLD: 7.45 [PH]
PH BLD: 7.47 [PH]
PH BLD: 7.56 [PH]
PH BLD: 7.57 [PH]
PH BLDA: 6.81 [PH] (ref 7.35–7.45)
PH BLDA: 7.1 [PH] (ref 7.35–7.45)
PH BLDA: 7.13 [PH] (ref 7.35–7.45)
PH BLDA: 7.14 [PH] (ref 7.35–7.45)
PH BLDA: 7.23 [PH] (ref 7.35–7.45)
PH BLDA: 7.27 [PH] (ref 7.35–7.45)
PH BLDA: 7.28 [PH] (ref 7.35–7.45)
PH BLDA: 7.31 [PH] (ref 7.35–7.45)
PH BLDA: 7.32 [PH] (ref 7.35–7.45)
PH BLDA: 7.35 [PH] (ref 7.35–7.45)
PH BLDA: 7.36 [PH] (ref 7.35–7.45)
PH BLDA: 7.37 [PH] (ref 7.35–7.45)
PH BLDA: 7.37 [PH] (ref 7.35–7.45)
PH BLDA: 7.39 [PH] (ref 7.35–7.45)
PH BLDA: 7.4 [PH] (ref 7.35–7.45)
PH BLDA: 7.4 [PH] (ref 7.35–7.45)
PH BLDA: 7.43 [PH] (ref 7.35–7.45)
PH BLDA: 7.47 [PH] (ref 7.35–7.45)
PH BLDA: 7.47 [PH] (ref 7.35–7.45)
PH BLDA: 7.49 [PH] (ref 7.35–7.45)
PH BLDA: 7.5 [PH] (ref 7.35–7.45)
PH BLDA: 7.51 [PH] (ref 7.35–7.45)
PH BLDA: <6.81 [PH] (ref 7.35–7.45)
PH BLDA: <6.81 [PH] (ref 7.35–7.45)
PH UR STRIP.AUTO: 5 [PH] (ref 5–8)
PHOSPHATE SERPL-MCNC: 4.2 MG/DL (ref 2.5–4.9)
PHOSPHATE SERPL-MCNC: 7 MG/DL (ref 2.5–4.9)
PLATELET # BLD AUTO: 106 10(3)UL (ref 150–450)
PLATELET # BLD AUTO: 107 10(3)UL (ref 150–450)
PLATELET # BLD AUTO: 108 10(3)UL (ref 150–450)
PLATELET # BLD AUTO: 112 10(3)UL (ref 150–450)
PLATELET # BLD AUTO: 129 10(3)UL (ref 150–450)
PLATELET # BLD AUTO: 206 10(3)UL (ref 150–450)
PLATELET # BLD AUTO: 263 10(3)UL (ref 150–450)
PLATELET # BLD AUTO: 270 10(3)UL (ref 150–450)
PLATELET # BLD AUTO: 38 10(3)UL (ref 150–450)
PLATELET # BLD AUTO: 42 10(3)UL (ref 150–450)
PLATELET # BLD AUTO: 45 10(3)UL (ref 150–450)
PLATELET # BLD AUTO: 56 10(3)UL (ref 150–450)
PLATELET # BLD AUTO: 91 10(3)UL (ref 150–450)
PLATELET # BLD AUTO: 92 10(3)UL (ref 150–450)
PLATELET MORPHOLOGY: NORMAL
PO2 BLD: 101 MMHG
PO2 BLD: 108 MMHG
PO2 BLD: 110 MMHG
PO2 BLD: 117 MMHG
PO2 BLD: 122 MMHG
PO2 BLD: 144 MMHG
PO2 BLD: 151 MMHG
PO2 BLD: 163 MMHG
PO2 BLD: 17 MMHG
PO2 BLD: 203 MMHG
PO2 BLD: 301 MMHG
PO2 BLD: 453 MMHG
PO2 BLD: 46 MMHG
PO2 BLD: 508 MMHG
PO2 BLD: 539 MMHG
PO2 BLD: 568 MMHG
PO2 BLD: 570 MMHG
PO2 BLD: 583 MMHG
PO2 BLD: 583 MMHG
PO2 BLD: 588 MMHG
PO2 BLD: 613 MMHG
PO2 BLD: 657 MMHG
PO2 BLD: 87 MMHG
PO2 BLD: 98 MMHG
PO2 BLDA: 101 MM HG (ref 80–100)
PO2 BLDA: 108 MMHG (ref 80–105)
PO2 BLDA: 111 MMHG (ref 80–105)
PO2 BLDA: 137 MM HG (ref 80–100)
PO2 BLDA: 143 MMHG (ref 80–105)
PO2 BLDA: 146 MMHG (ref 80–105)
PO2 BLDA: 150 MM HG (ref 80–100)
PO2 BLDA: 166 MMHG (ref 80–105)
PO2 BLDA: 194 MM HG (ref 80–100)
PO2 BLDA: 356 MMHG (ref 80–105)
PO2 BLDA: 364 MM HG (ref 80–100)
PO2 BLDA: 425 MM HG (ref 80–100)
PO2 BLDA: 452 MM HG (ref 80–100)
PO2 BLDA: 73 MMHG (ref 80–105)
PO2 BLDA: 74 MMHG (ref 80–105)
PO2 BLDA: 76 MMHG (ref 80–105)
PO2 BLDA: 79 MMHG (ref 80–105)
PO2 BLDA: 82 MMHG (ref 80–105)
PO2 BLDA: 82 MMHG (ref 80–105)
PO2 BLDA: 85 MMHG (ref 80–105)
PO2 BLDA: 86 MMHG (ref 80–105)
PO2 BLDA: 89 MM HG (ref 80–100)
PO2 BLDA: 90 MMHG (ref 80–105)
PO2 BLDA: 98 MMHG (ref 80–105)
POTASSIUM BLD-SCNC: 2.8 MMOL/L (ref 3.6–5.1)
POTASSIUM BLD-SCNC: 2.8 MMOL/L (ref 3.6–5.1)
POTASSIUM BLD-SCNC: 3.3 MMOL/L (ref 3.6–5.1)
POTASSIUM BLD-SCNC: 3.6 MMOL/L (ref 3.6–5.1)
POTASSIUM BLD-SCNC: 3.7 MMOL/L (ref 3.6–5.1)
POTASSIUM BLD-SCNC: 3.7 MMOL/L (ref 3.6–5.1)
POTASSIUM BLD-SCNC: 3.8 MMOL/L (ref 3.6–5.1)
POTASSIUM BLD-SCNC: 3.8 MMOL/L (ref 3.6–5.1)
POTASSIUM BLD-SCNC: 3.9 MMOL/L (ref 3.6–5.1)
POTASSIUM BLD-SCNC: 4 MMOL/L (ref 3.6–5.1)
POTASSIUM BLD-SCNC: 4.3 MMOL/L (ref 3.6–5.1)
POTASSIUM BLD-SCNC: 4.4 MMOL/L (ref 3.6–5.1)
POTASSIUM BLD-SCNC: 4.5 MMOL/L (ref 3.6–5.1)
POTASSIUM BLD-SCNC: 4.6 MMOL/L (ref 3.6–5.1)
POTASSIUM BLD-SCNC: 4.9 MMOL/L (ref 3.6–5.1)
POTASSIUM BLD-SCNC: 5 MMOL/L (ref 3.6–5.1)
POTASSIUM BLD-SCNC: 5.1 MMOL/L (ref 3.6–5.1)
POTASSIUM BLD-SCNC: 5.2 MMOL/L (ref 3.6–5.1)
POTASSIUM BLD-SCNC: 5.6 MMOL/L (ref 3.6–5.1)
POTASSIUM BLD-SCNC: 5.9 MMOL/L (ref 3.6–5.1)
POTASSIUM BLD-SCNC: 6.1 MMOL/L (ref 3.6–5.1)
POTASSIUM SERPL-SCNC: 3.3 MMOL/L (ref 3.5–5.1)
POTASSIUM SERPL-SCNC: 3.6 MMOL/L (ref 3.5–5.1)
POTASSIUM SERPL-SCNC: 3.6 MMOL/L (ref 3.5–5.1)
POTASSIUM SERPL-SCNC: 3.7 MMOL/L (ref 3.5–5.1)
POTASSIUM SERPL-SCNC: 3.8 MMOL/L (ref 3.5–5.1)
POTASSIUM SERPL-SCNC: 3.9 MMOL/L (ref 3.5–5.1)
POTASSIUM SERPL-SCNC: 4.2 MMOL/L (ref 3.5–5.1)
POTASSIUM SERPL-SCNC: 4.5 MMOL/L (ref 3.5–5.1)
POTASSIUM SERPL-SCNC: 4.8 MMOL/L (ref 3.5–5.1)
POTASSIUM SERPL-SCNC: 5.5 MMOL/L (ref 3.5–5.1)
POTASSIUM SERPL-SCNC: 5.6 MMOL/L (ref 3.5–5.1)
POTASSIUM SERPL-SCNC: 5.8 MMOL/L (ref 3.5–5.1)
POTASSIUM SERPL-SCNC: 6.3 MMOL/L (ref 3.5–5.1)
PROT SERPL-MCNC: 3.7 G/DL (ref 6.4–8.2)
PROT SERPL-MCNC: 4.9 G/DL (ref 6.4–8.2)
PROT SERPL-MCNC: 5.2 G/DL (ref 6.4–8.2)
PROT SERPL-MCNC: 5.2 G/DL (ref 6.4–8.2)
PROT SERPL-MCNC: 5.7 G/DL (ref 6.4–8.2)
PROT SERPL-MCNC: 6 G/DL (ref 6.4–8.2)
PROT SERPL-MCNC: 7 G/DL (ref 6.4–8.2)
PROT SERPL-MCNC: 7.2 G/DL (ref 6.4–8.2)
PROT UR STRIP.AUTO-MCNC: 30 MG/DL
PROTHROMBIN TIME: 12.8 SECONDS (ref 11.6–14.8)
PROTHROMBIN TIME: 16.6 SECONDS (ref 11.6–14.8)
PROTHROMBIN TIME: 18.6 SECONDS (ref 11.6–14.8)
PROTHROMBIN TIME: 20.1 SECONDS (ref 11.6–14.8)
Q-T INTERVAL: 250 MS
Q-T INTERVAL: 354 MS
Q-T INTERVAL: 362 MS
Q-T INTERVAL: 468 MS
QRS DURATION: 72 MS
QRS DURATION: 78 MS
QRS DURATION: 92 MS
QRS DURATION: 96 MS
QTC CALCULATION (BEZET): 367 MS
QTC CALCULATION (BEZET): 431 MS
QTC CALCULATION (BEZET): 472 MS
QTC CALCULATION (BEZET): 492 MS
R AXIS: 26 DEGREES
R AXIS: 41 DEGREES
R AXIS: 62 DEGREES
R AXIS: 70 DEGREES
RBC # BLD AUTO: 2.56 X10(6)UL
RBC # BLD AUTO: 2.86 X10(6)UL
RBC # BLD AUTO: 3.17 X10(6)UL
RBC # BLD AUTO: 3.29 X10(6)UL
RBC # BLD AUTO: 3.39 X10(6)UL
RBC # BLD AUTO: 3.4 X10(6)UL
RBC # BLD AUTO: 3.46 X10(6)UL
RBC # BLD AUTO: 3.72 X10(6)UL
RBC # BLD AUTO: 3.74 X10(6)UL
RBC # BLD AUTO: 3.76 X10(6)UL
RBC # BLD AUTO: 4.41 X10(6)UL
RBC # BLD AUTO: 4.52 X10(6)UL
RBC # BLD AUTO: 4.58 X10(6)UL
RBC # FLD: 8000 /MM3
RBC #/AREA URNS AUTO: >10 /HPF
RH BLOOD TYPE: POSITIVE
RH BLOOD TYPE: POSITIVE
RHINOVIRUS/ENTERO PCR:: NOT DETECTED
RSV RNA SPEC NAA+PROBE: NEGATIVE
RSV RNA SPEC QL NAA+PROBE: NOT DETECTED
SAO2 % BLD: 100 %
SAO2 % BLD: 17 %
SAO2 % BLD: 68 %
SAO2 % BLD: 96 %
SAO2 % BLD: 97 %
SAO2 % BLD: 98 %
SAO2 % BLD: 98 %
SAO2 % BLD: 99 %
SAO2 % BLDA: 100 % (ref 92–100)
SAO2 % BLDA: 88 % (ref 92–100)
SAO2 % BLDA: 92 % (ref 92–100)
SAO2 % BLDA: 93 % (ref 92–100)
SAO2 % BLDA: 94 % (ref 92–100)
SAO2 % BLDA: 96 % (ref 92–100)
SAO2 % BLDA: 97 % (ref 92–100)
SAO2 % BLDA: 97 % (ref 92–100)
SAO2 % BLDA: 98 % (ref 92–100)
SAO2 % BLDA: 99 % (ref 92–100)
SARS-COV-2 RNA NPH QL NAA+NON-PROBE: NOT DETECTED
SARS-COV-2 RNA RESP QL NAA+PROBE: NOT DETECTED
SARS-COV-2 RNA RESP QL NAA+PROBE: NOT DETECTED
SODIUM BLD-SCNC: 136 MMOL/L (ref 136–145)
SODIUM BLD-SCNC: 138 MMOL/L (ref 136–145)
SODIUM BLD-SCNC: 140 MMOL/L (ref 136–145)
SODIUM BLD-SCNC: 142 MMOL/L (ref 136–145)
SODIUM BLD-SCNC: 147 MMOL/L (ref 135–145)
SODIUM BLD-SCNC: 148 MMOL/L (ref 135–145)
SODIUM BLD-SCNC: 148 MMOL/L (ref 136–145)
SODIUM BLD-SCNC: 149 MMOL/L (ref 135–145)
SODIUM BLD-SCNC: 149 MMOL/L (ref 136–145)
SODIUM BLD-SCNC: 150 MMOL/L (ref 136–145)
SODIUM BLD-SCNC: 151 MMOL/L (ref 136–145)
SODIUM BLD-SCNC: 152 MMOL/L (ref 136–145)
SODIUM BLD-SCNC: 153 MMOL/L (ref 136–145)
SODIUM BLD-SCNC: 154 MMOL/L (ref 136–145)
SODIUM BLDA-SCNC: 137 MMOL/L (ref 136–145)
SODIUM BLDA-SCNC: 140 MMOL/L (ref 136–145)
SODIUM BLDA-SCNC: 143 MMOL/L (ref 136–145)
SODIUM BLDA-SCNC: 148 MMOL/L (ref 136–145)
SODIUM BLDA-SCNC: 149 MMOL/L (ref 136–145)
SODIUM BLDA-SCNC: 149 MMOL/L (ref 136–145)
SODIUM BLDA-SCNC: 150 MMOL/L (ref 136–145)
SODIUM BLDA-SCNC: 151 MMOL/L (ref 136–145)
SODIUM BLDA-SCNC: 3.4 MMOL/L (ref 3.6–5.1)
SODIUM BLDA-SCNC: 3.7 MMOL/L (ref 3.6–5.1)
SODIUM BLDA-SCNC: 3.9 MMOL/L (ref 3.6–5.1)
SODIUM BLDA-SCNC: 4.1 MMOL/L (ref 3.6–5.1)
SODIUM BLDA-SCNC: 4.2 MMOL/L (ref 3.6–5.1)
SODIUM BLDA-SCNC: 4.3 MMOL/L (ref 3.6–5.1)
SODIUM BLDA-SCNC: 4.4 MMOL/L (ref 3.6–5.1)
SODIUM BLDA-SCNC: 4.6 MMOL/L (ref 3.6–5.1)
SODIUM BLDA-SCNC: 4.8 MMOL/L (ref 3.6–5.1)
SODIUM BLDA-SCNC: 5.3 MMOL/L (ref 3.6–5.1)
SODIUM BLDA-SCNC: 5.4 MMOL/L (ref 3.6–5.1)
SODIUM BLDA-SCNC: 5.5 MMOL/L (ref 3.6–5.1)
SODIUM BLDA-SCNC: 5.7 MMOL/L (ref 3.6–5.1)
SODIUM BLDA-SCNC: 5.7 MMOL/L (ref 3.6–5.1)
SODIUM SERPL-SCNC: 140 MMOL/L (ref 136–145)
SODIUM SERPL-SCNC: 143 MMOL/L (ref 136–145)
SODIUM SERPL-SCNC: 143 MMOL/L (ref 136–145)
SODIUM SERPL-SCNC: 146 MMOL/L (ref 136–145)
SODIUM SERPL-SCNC: 150 MMOL/L (ref 136–145)
SODIUM SERPL-SCNC: 151 MMOL/L (ref 136–145)
SODIUM SERPL-SCNC: 152 MMOL/L (ref 136–145)
SODIUM SERPL-SCNC: 155 MMOL/L (ref 136–145)
SP GR UR STRIP.AUTO: 1.03 (ref 1–1.03)
T AXIS: 192 DEGREES
T AXIS: 225 DEGREES
T AXIS: 256 DEGREES
TOTAL CELLS COUNTED BLD: 100
TOTAL CELLS COUNTED FLD: 100
TRIGL SERPL-MCNC: 173 MG/DL (ref 30–149)
TROPONIN I HIGH SENSITIVITY: 16 NG/L
TROPONIN I HIGH SENSITIVITY: 23 NG/L
TROPONIN I HIGH SENSITIVITY: 8 NG/L
UROBILINOGEN UR STRIP.AUTO-MCNC: <2 MG/DL
VENT RATE: 10 /MIN
VENT RATE: 15 /MIN
VENT RATE: 28 /MIN
VENTRICULAR RATE: 107 BPM
VENTRICULAR RATE: 111 BPM
VENTRICULAR RATE: 179 BPM
VENTRICULAR RATE: 37 BPM
WBC # BLD AUTO: 10.4 X10(3) UL (ref 4–11)
WBC # BLD AUTO: 11.4 X10(3) UL (ref 4–11)
WBC # BLD AUTO: 12 X10(3) UL (ref 4–11)
WBC # BLD AUTO: 14.7 X10(3) UL (ref 4–11)
WBC # BLD AUTO: 16 X10(3) UL (ref 4–11)
WBC # BLD AUTO: 19 X10(3) UL (ref 4–11)
WBC # BLD AUTO: 22.6 X10(3) UL (ref 4–11)
WBC # BLD AUTO: 22.7 X10(3) UL (ref 4–11)
WBC # BLD AUTO: 25 X10(3) UL (ref 4–11)
WBC # BLD AUTO: 25.6 X10(3) UL (ref 4–11)
WBC # BLD AUTO: 25.8 X10(3) UL (ref 4–11)
WBC # BLD AUTO: 25.8 X10(3) UL (ref 4–11)
WBC # BLD AUTO: 27.2 X10(3) UL (ref 4–11)
WBC # BLD AUTO: 6.8 X10(3) UL (ref 4–11)
WBC # FLD: 8933 /MM3
WBC CLUMPS UR QL AUTO: PRESENT /HPF

## 2022-01-01 PROCEDURE — B54BZZA ULTRASONOGRAPHY OF RIGHT LOWER EXTREMITY VEINS, GUIDANCE: ICD-10-PCS | Performed by: INTERNAL MEDICINE

## 2022-01-01 PROCEDURE — 30233R1 TRANSFUSION OF NONAUTOLOGOUS PLATELETS INTO PERIPHERAL VEIN, PERCUTANEOUS APPROACH: ICD-10-PCS | Performed by: INTERNAL MEDICINE

## 2022-01-01 PROCEDURE — 76942 ECHO GUIDE FOR BIOPSY: CPT | Performed by: INTERNAL MEDICINE

## 2022-01-01 PROCEDURE — 0BJ08ZZ INSPECTION OF TRACHEOBRONCHIAL TREE, VIA NATURAL OR ARTIFICIAL OPENING ENDOSCOPIC: ICD-10-PCS | Performed by: OTHER

## 2022-01-01 PROCEDURE — 99292 CRITICAL CARE ADDL 30 MIN: CPT | Performed by: OTHER

## 2022-01-01 PROCEDURE — 99291 CRITICAL CARE FIRST HOUR: CPT | Performed by: INTERNAL MEDICINE

## 2022-01-01 PROCEDURE — 84484 ASSAY OF TROPONIN QUANT: CPT | Performed by: EMERGENCY MEDICINE

## 2022-01-01 PROCEDURE — 99291 CRITICAL CARE FIRST HOUR: CPT | Performed by: OTHER

## 2022-01-01 PROCEDURE — 93005 ELECTROCARDIOGRAM TRACING: CPT

## 2022-01-01 PROCEDURE — 06HN33Z INSERTION OF INFUSION DEVICE INTO LEFT FEMORAL VEIN, PERCUTANEOUS APPROACH: ICD-10-PCS | Performed by: THORACIC SURGERY (CARDIOTHORACIC VASCULAR SURGERY)

## 2022-01-01 PROCEDURE — 93312 ECHO TRANSESOPHAGEAL: CPT | Performed by: INTERNAL MEDICINE

## 2022-01-01 PROCEDURE — 94640 AIRWAY INHALATION TREATMENT: CPT

## 2022-01-01 PROCEDURE — 5A1D90Z PERFORMANCE OF URINARY FILTRATION, CONTINUOUS, GREATER THAN 18 HOURS PER DAY: ICD-10-PCS | Performed by: INTERNAL MEDICINE

## 2022-01-01 PROCEDURE — 71045 X-RAY EXAM CHEST 1 VIEW: CPT | Performed by: INTERNAL MEDICINE

## 2022-01-01 PROCEDURE — 71045 X-RAY EXAM CHEST 1 VIEW: CPT | Performed by: EMERGENCY MEDICINE

## 2022-01-01 PROCEDURE — 99232 SBSQ HOSP IP/OBS MODERATE 35: CPT | Performed by: INTERNAL MEDICINE

## 2022-01-01 PROCEDURE — 99233 SBSQ HOSP IP/OBS HIGH 50: CPT | Performed by: INTERNAL MEDICINE

## 2022-01-01 PROCEDURE — 70496 CT ANGIOGRAPHY HEAD: CPT | Performed by: NURSE PRACTITIONER

## 2022-01-01 PROCEDURE — 5A19054 RESPIRATORY VENTILATION, SINGLE, NONMECHANICAL: ICD-10-PCS | Performed by: EMERGENCY MEDICINE

## 2022-01-01 PROCEDURE — B246ZZ4 ULTRASONOGRAPHY OF RIGHT AND LEFT HEART, TRANSESOPHAGEAL: ICD-10-PCS | Performed by: INTERNAL MEDICINE

## 2022-01-01 PROCEDURE — 93010 ELECTROCARDIOGRAM REPORT: CPT

## 2022-01-01 PROCEDURE — 02HV33Z INSERTION OF INFUSION DEVICE INTO SUPERIOR VENA CAVA, PERCUTANEOUS APPROACH: ICD-10-PCS | Performed by: RADIOLOGY

## 2022-01-01 PROCEDURE — 74174 CTA ABD&PLVS W/CONTRAST: CPT | Performed by: INTERNAL MEDICINE

## 2022-01-01 PROCEDURE — 5A12012 PERFORMANCE OF CARDIAC OUTPUT, SINGLE, MANUAL: ICD-10-PCS | Performed by: EMERGENCY MEDICINE

## 2022-01-01 PROCEDURE — 30233N1 TRANSFUSION OF NONAUTOLOGOUS RED BLOOD CELLS INTO PERIPHERAL VEIN, PERCUTANEOUS APPROACH: ICD-10-PCS | Performed by: THORACIC SURGERY (CARDIOTHORACIC VASCULAR SURGERY)

## 2022-01-01 PROCEDURE — 80053 COMPREHEN METABOLIC PANEL: CPT | Performed by: EMERGENCY MEDICINE

## 2022-01-01 PROCEDURE — 71045 X-RAY EXAM CHEST 1 VIEW: CPT | Performed by: NURSE PRACTITIONER

## 2022-01-01 PROCEDURE — 85379 FIBRIN DEGRADATION QUANT: CPT | Performed by: EMERGENCY MEDICINE

## 2022-01-01 PROCEDURE — 96376 TX/PRO/DX INJ SAME DRUG ADON: CPT

## 2022-01-01 PROCEDURE — 5A1955Z RESPIRATORY VENTILATION, GREATER THAN 96 CONSECUTIVE HOURS: ICD-10-PCS | Performed by: EMERGENCY MEDICINE

## 2022-01-01 PROCEDURE — 0BH18EZ INSERTION OF ENDOTRACHEAL AIRWAY INTO TRACHEA, VIA NATURAL OR ARTIFICIAL OPENING ENDOSCOPIC: ICD-10-PCS | Performed by: EMERGENCY MEDICINE

## 2022-01-01 PROCEDURE — 0B9H8ZX DRAINAGE OF LUNG LINGULA, VIA NATURAL OR ARTIFICIAL OPENING ENDOSCOPIC, DIAGNOSTIC: ICD-10-PCS | Performed by: INTERNAL MEDICINE

## 2022-01-01 PROCEDURE — 71275 CT ANGIOGRAPHY CHEST: CPT | Performed by: INTERNAL MEDICINE

## 2022-01-01 PROCEDURE — 96374 THER/PROPH/DIAG INJ IV PUSH: CPT

## 2022-01-01 PROCEDURE — 85025 COMPLETE CBC W/AUTO DIFF WBC: CPT | Performed by: EMERGENCY MEDICINE

## 2022-01-01 PROCEDURE — 06HM33Z INSERTION OF INFUSION DEVICE INTO RIGHT FEMORAL VEIN, PERCUTANEOUS APPROACH: ICD-10-PCS | Performed by: THORACIC SURGERY (CARDIOTHORACIC VASCULAR SURGERY)

## 2022-01-01 PROCEDURE — 99284 EMERGENCY DEPT VISIT MOD MDM: CPT

## 2022-01-01 PROCEDURE — 6A4Z0ZZ HYPOTHERMIA, SINGLE: ICD-10-PCS | Performed by: EMERGENCY MEDICINE

## 2022-01-01 PROCEDURE — 3E033XZ INTRODUCTION OF VASOPRESSOR INTO PERIPHERAL VEIN, PERCUTANEOUS APPROACH: ICD-10-PCS | Performed by: EMERGENCY MEDICINE

## 2022-01-01 PROCEDURE — 96375 TX/PRO/DX INJ NEW DRUG ADDON: CPT

## 2022-01-01 PROCEDURE — 5A1522H EXTRACORPOREAL OXYGENATION, MEMBRANE, PERIPHERAL VENO-VENOUS: ICD-10-PCS | Performed by: THORACIC SURGERY (CARDIOTHORACIC VASCULAR SURGERY)

## 2022-01-01 PROCEDURE — 78606 BRAIN IMAGE W/FLOW 4 + VIEWS: CPT | Performed by: OTHER

## 2022-01-01 RX ORDER — SODIUM CHLORIDE 9 MG/ML
INJECTION, SOLUTION INTRAVENOUS ONCE
Status: COMPLETED | OUTPATIENT
Start: 2022-01-01 | End: 2022-01-01

## 2022-01-01 RX ORDER — ROCURONIUM BROMIDE 10 MG/ML
INJECTION, SOLUTION INTRAVENOUS AS NEEDED
Status: DISCONTINUED | OUTPATIENT
Start: 2022-01-01 | End: 2022-01-01 | Stop reason: SURG

## 2022-01-01 RX ORDER — NITROGLYCERIN 20 MG/100ML
INJECTION INTRAVENOUS CONTINUOUS
Status: DISCONTINUED | OUTPATIENT
Start: 2022-01-01 | End: 2022-01-01 | Stop reason: ALTCHOICE

## 2022-01-01 RX ORDER — ALBUMIN, HUMAN INJ 5% 5 %
12.5 SOLUTION INTRAVENOUS ONCE
Status: COMPLETED | OUTPATIENT
Start: 2022-01-01 | End: 2022-01-01

## 2022-01-01 RX ORDER — MIDAZOLAM HYDROCHLORIDE 1 MG/ML
4 INJECTION INTRAMUSCULAR; INTRAVENOUS ONCE
Status: COMPLETED | OUTPATIENT
Start: 2022-01-01 | End: 2022-01-01

## 2022-01-01 RX ORDER — MAGNESIUM SULFATE HEPTAHYDRATE 40 MG/ML
2 INJECTION, SOLUTION INTRAVENOUS ONCE
Status: COMPLETED | OUTPATIENT
Start: 2022-01-01 | End: 2022-01-01

## 2022-01-01 RX ORDER — ALBUTEROL SULFATE 2.5 MG/3ML
2.5 SOLUTION RESPIRATORY (INHALATION)
Status: DISCONTINUED | OUTPATIENT
Start: 2022-01-01 | End: 2022-01-01

## 2022-01-01 RX ORDER — ALBUMIN, HUMAN INJ 5% 5 %
250 SOLUTION INTRAVENOUS ONCE
Status: COMPLETED | OUTPATIENT
Start: 2022-01-01 | End: 2022-01-01

## 2022-01-01 RX ORDER — IPRATROPIUM BROMIDE AND ALBUTEROL SULFATE 2.5; .5 MG/3ML; MG/3ML
3 SOLUTION RESPIRATORY (INHALATION) ONCE
Status: COMPLETED | OUTPATIENT
Start: 2022-01-01 | End: 2022-01-01

## 2022-01-01 RX ORDER — ALBUMIN, HUMAN INJ 5% 5 %
SOLUTION INTRAVENOUS
Status: COMPLETED
Start: 2022-01-01 | End: 2022-01-01

## 2022-01-01 RX ORDER — LABETALOL HYDROCHLORIDE 5 MG/ML
10 INJECTION, SOLUTION INTRAVENOUS EVERY 4 HOURS PRN
Status: DISCONTINUED | OUTPATIENT
Start: 2022-01-01 | End: 2022-01-01

## 2022-01-01 RX ORDER — LIDOCAINE HYDROCHLORIDE 10 MG/ML
INJECTION, SOLUTION INFILTRATION; PERINEURAL
Status: COMPLETED
Start: 2022-01-01 | End: 2022-01-01

## 2022-01-01 RX ORDER — DEXTROSE MONOHYDRATE 25 G/50ML
50 INJECTION, SOLUTION INTRAVENOUS ONCE
Status: COMPLETED | OUTPATIENT
Start: 2022-01-01 | End: 2022-01-01

## 2022-01-01 RX ORDER — HEPARIN SODIUM 5000 [USP'U]/ML
5000 INJECTION, SOLUTION INTRAVENOUS; SUBCUTANEOUS ONCE
Status: COMPLETED | OUTPATIENT
Start: 2022-01-01 | End: 2022-01-01

## 2022-01-01 RX ORDER — ONDANSETRON 2 MG/ML
4 INJECTION INTRAMUSCULAR; INTRAVENOUS EVERY 6 HOURS PRN
Status: DISCONTINUED | OUTPATIENT
Start: 2022-01-01 | End: 2022-01-01

## 2022-01-01 RX ORDER — POLYETHYLENE GLYCOL 3350 17 G/17G
17 POWDER, FOR SOLUTION ORAL DAILY PRN
Status: DISCONTINUED | OUTPATIENT
Start: 2022-01-01 | End: 2022-01-01

## 2022-01-01 RX ORDER — METHYLPREDNISOLONE SODIUM SUCCINATE 125 MG/2ML
125 INJECTION, POWDER, LYOPHILIZED, FOR SOLUTION INTRAMUSCULAR; INTRAVENOUS ONCE
Status: COMPLETED | OUTPATIENT
Start: 2022-01-01 | End: 2022-01-01

## 2022-01-01 RX ORDER — HEPARIN SODIUM 1000 [USP'U]/ML
INJECTION, SOLUTION INTRAVENOUS; SUBCUTANEOUS AS NEEDED
Status: DISCONTINUED | OUTPATIENT
Start: 2022-01-01 | End: 2022-01-01 | Stop reason: SURG

## 2022-01-01 RX ORDER — MIDAZOLAM HYDROCHLORIDE 1 MG/ML
2 INJECTION INTRAMUSCULAR; INTRAVENOUS EVERY 5 MIN PRN
Status: DISCONTINUED | OUTPATIENT
Start: 2022-01-01 | End: 2022-01-01

## 2022-01-01 RX ORDER — DEXTROSE MONOHYDRATE 25 G/50ML
50 INJECTION, SOLUTION INTRAVENOUS
Status: DISCONTINUED | OUTPATIENT
Start: 2022-01-01 | End: 2022-01-01

## 2022-01-01 RX ORDER — NICOTINE POLACRILEX 4 MG
15 LOZENGE BUCCAL
Status: DISCONTINUED | OUTPATIENT
Start: 2022-01-01 | End: 2022-01-01

## 2022-01-01 RX ORDER — BISACODYL 10 MG
10 SUPPOSITORY, RECTAL RECTAL
Status: DISCONTINUED | OUTPATIENT
Start: 2022-01-01 | End: 2022-01-01

## 2022-01-01 RX ORDER — SODIUM CHLORIDE, SODIUM LACTATE, POTASSIUM CHLORIDE, CALCIUM CHLORIDE 600; 310; 30; 20 MG/100ML; MG/100ML; MG/100ML; MG/100ML
INJECTION, SOLUTION INTRAVENOUS CONTINUOUS
Status: DISCONTINUED | OUTPATIENT
Start: 2022-01-01 | End: 2022-01-01

## 2022-01-01 RX ORDER — CHLORHEXIDINE GLUCONATE 0.12 MG/ML
15 RINSE ORAL
Status: DISCONTINUED | OUTPATIENT
Start: 2022-01-01 | End: 2022-01-01

## 2022-01-01 RX ORDER — PHENYLEPHRINE HCL IN 0.9% NACL 50MG/250ML
PLASTIC BAG, INJECTION (ML) INTRAVENOUS CONTINUOUS
Status: DISCONTINUED | OUTPATIENT
Start: 2022-01-01 | End: 2022-01-01

## 2022-01-01 RX ORDER — HEPARIN SODIUM 5000 [USP'U]/ML
INJECTION, SOLUTION INTRAVENOUS; SUBCUTANEOUS
Status: COMPLETED
Start: 2022-01-01 | End: 2022-01-01

## 2022-01-01 RX ORDER — METHYLPREDNISOLONE SODIUM SUCCINATE 125 MG/2ML
125 INJECTION, POWDER, LYOPHILIZED, FOR SOLUTION INTRAMUSCULAR; INTRAVENOUS EVERY 6 HOURS
Status: DISCONTINUED | OUTPATIENT
Start: 2022-01-01 | End: 2022-01-01

## 2022-01-01 RX ORDER — LORAZEPAM 2 MG/ML
2 INJECTION INTRAMUSCULAR ONCE
Status: COMPLETED | OUTPATIENT
Start: 2022-01-01 | End: 2022-01-01

## 2022-01-01 RX ORDER — SODIUM CHLORIDE 9 MG/ML
INJECTION, SOLUTION INTRAVENOUS ONCE
Status: DISCONTINUED | OUTPATIENT
Start: 2022-01-01 | End: 2022-01-01

## 2022-01-01 RX ORDER — PREDNISONE 20 MG/1
40 TABLET ORAL DAILY
Qty: 10 TABLET | Refills: 0 | Status: SHIPPED | OUTPATIENT
Start: 2022-01-01 | End: 2022-01-01

## 2022-01-01 RX ORDER — CLINDAMYCIN PHOSPHATE 900 MG/50ML
INJECTION INTRAVENOUS AS NEEDED
Status: DISCONTINUED | OUTPATIENT
Start: 2022-01-01 | End: 2022-01-01 | Stop reason: SURG

## 2022-01-01 RX ORDER — SODIUM PHOSPHATE, DIBASIC AND SODIUM PHOSPHATE, MONOBASIC 7; 19 G/133ML; G/133ML
1 ENEMA RECTAL ONCE AS NEEDED
Status: DISCONTINUED | OUTPATIENT
Start: 2022-01-01 | End: 2022-01-01

## 2022-01-01 RX ORDER — LORAZEPAM 2 MG/ML
0.5 INJECTION INTRAMUSCULAR ONCE
Status: COMPLETED | OUTPATIENT
Start: 2022-01-01 | End: 2022-01-01

## 2022-01-01 RX ORDER — LABETALOL HYDROCHLORIDE 5 MG/ML
20 INJECTION, SOLUTION INTRAVENOUS ONCE
Status: COMPLETED | OUTPATIENT
Start: 2022-01-01 | End: 2022-01-01

## 2022-01-01 RX ORDER — SODIUM CHLORIDE 9 MG/ML
INJECTION, SOLUTION INTRAVENOUS
Status: COMPLETED | OUTPATIENT
Start: 2022-01-01 | End: 2022-01-01

## 2022-01-01 RX ORDER — SENNOSIDES 8.8 MG/5ML
10 LIQUID ORAL NIGHTLY PRN
Status: DISCONTINUED | OUTPATIENT
Start: 2022-01-01 | End: 2022-01-01

## 2022-01-01 RX ORDER — ACETAMINOPHEN 650 MG/1
650 SUPPOSITORY RECTAL EVERY 4 HOURS PRN
Status: DISCONTINUED | OUTPATIENT
Start: 2022-01-01 | End: 2022-01-01

## 2022-01-01 RX ORDER — ROCURONIUM BROMIDE 10 MG/ML
50 INJECTION, SOLUTION INTRAVENOUS ONCE
Status: COMPLETED | OUTPATIENT
Start: 2022-01-01 | End: 2022-01-01

## 2022-01-01 RX ORDER — POTASSIUM CHLORIDE 14.9 MG/ML
20 INJECTION INTRAVENOUS ONCE
Status: COMPLETED | OUTPATIENT
Start: 2022-01-01 | End: 2022-01-01

## 2022-01-01 RX ORDER — CALCIUM GLUCONATE 20 MG/ML
2 INJECTION, SOLUTION INTRAVENOUS ONCE
Status: COMPLETED | OUTPATIENT
Start: 2022-01-01 | End: 2022-01-01

## 2022-01-01 RX ORDER — DOPAMINE HYDROCHLORIDE 320 MG/100ML
INJECTION, SOLUTION INTRAVENOUS CONTINUOUS
Status: DISCONTINUED | OUTPATIENT
Start: 2022-01-01 | End: 2022-01-01 | Stop reason: ALTCHOICE

## 2022-01-01 RX ORDER — SODIUM CHLORIDE 9 MG/ML
INJECTION, SOLUTION INTRAVENOUS CONTINUOUS PRN
Status: DISCONTINUED | OUTPATIENT
Start: 2022-01-01 | End: 2022-01-01 | Stop reason: SURG

## 2022-01-01 RX ORDER — MINERAL OIL AND PETROLATUM 150; 830 MG/G; MG/G
1 OINTMENT OPHTHALMIC 2 TIMES DAILY
Status: DISCONTINUED | OUTPATIENT
Start: 2022-01-01 | End: 2022-01-01

## 2022-01-01 RX ORDER — IOHEXOL 350 MG/ML
60 INJECTION, SOLUTION INTRAVENOUS
Status: COMPLETED | OUTPATIENT
Start: 2022-01-01 | End: 2022-01-01

## 2022-01-01 RX ORDER — ACETAMINOPHEN 325 MG/1
650 TABLET ORAL EVERY 4 HOURS PRN
Status: DISCONTINUED | OUTPATIENT
Start: 2022-01-01 | End: 2022-01-01

## 2022-01-01 RX ORDER — PREDNISONE 20 MG/1
60 TABLET ORAL ONCE
Status: COMPLETED | OUTPATIENT
Start: 2022-01-01 | End: 2022-01-01

## 2022-01-01 RX ORDER — NITROGLYCERIN 20 MG/100ML
INJECTION INTRAVENOUS
Status: COMPLETED
Start: 2022-01-01 | End: 2022-01-01

## 2022-01-01 RX ORDER — NICOTINE POLACRILEX 4 MG
30 LOZENGE BUCCAL
Status: DISCONTINUED | OUTPATIENT
Start: 2022-01-01 | End: 2022-01-01

## 2022-01-01 RX ORDER — HEPARIN SODIUM 5000 [USP'U]/ML
5000 INJECTION, SOLUTION INTRAVENOUS; SUBCUTANEOUS EVERY 8 HOURS SCHEDULED
Status: DISCONTINUED | OUTPATIENT
Start: 2022-01-01 | End: 2022-01-01

## 2022-01-01 RX ORDER — HEPARIN SODIUM AND DEXTROSE 10000; 5 [USP'U]/100ML; G/100ML
INJECTION INTRAVENOUS CONTINUOUS
Status: DISCONTINUED | OUTPATIENT
Start: 2022-01-01 | End: 2022-01-01

## 2022-01-01 RX ORDER — ASPIRIN 81 MG/1
324 TABLET, CHEWABLE ORAL ONCE
Status: DISCONTINUED | OUTPATIENT
Start: 2022-01-01 | End: 2022-01-01

## 2022-01-01 RX ORDER — PROCHLORPERAZINE EDISYLATE 5 MG/ML
5 INJECTION INTRAMUSCULAR; INTRAVENOUS EVERY 8 HOURS PRN
Status: DISCONTINUED | OUTPATIENT
Start: 2022-01-01 | End: 2022-01-01

## 2022-01-01 RX ORDER — LORAZEPAM 2 MG/ML
INJECTION INTRAMUSCULAR
Status: COMPLETED
Start: 2022-01-01 | End: 2022-01-01

## 2022-01-01 RX ORDER — IOHEXOL 350 MG/ML
100 INJECTION, SOLUTION INTRAVENOUS
Status: COMPLETED | OUTPATIENT
Start: 2022-01-01 | End: 2022-01-01

## 2022-01-01 RX ORDER — MIDAZOLAM HYDROCHLORIDE 1 MG/ML
INJECTION INTRAMUSCULAR; INTRAVENOUS AS NEEDED
Status: DISCONTINUED | OUTPATIENT
Start: 2022-01-01 | End: 2022-01-01 | Stop reason: SURG

## 2022-01-01 RX ORDER — HEPARIN SODIUM AND DEXTROSE 10000; 5 [USP'U]/100ML; G/100ML
INJECTION INTRAVENOUS CONTINUOUS PRN
Status: CANCELLED | OUTPATIENT
Start: 2022-01-01

## 2022-01-01 RX ORDER — ACETAMINOPHEN 10 MG/ML
1000 INJECTION, SOLUTION INTRAVENOUS EVERY 6 HOURS PRN
Status: DISCONTINUED | OUTPATIENT
Start: 2022-01-01 | End: 2022-01-01

## 2022-01-01 RX ORDER — ACETAMINOPHEN 160 MG/5ML
650 SOLUTION ORAL EVERY 4 HOURS PRN
Status: DISCONTINUED | OUTPATIENT
Start: 2022-01-01 | End: 2022-01-01

## 2022-01-01 RX ADMIN — MIDAZOLAM HYDROCHLORIDE 4 MG: 1 INJECTION INTRAMUSCULAR; INTRAVENOUS at 20:29:00

## 2022-01-01 RX ADMIN — ROCURONIUM BROMIDE 50 MG: 10 INJECTION, SOLUTION INTRAVENOUS at 20:29:00

## 2022-01-01 RX ADMIN — HEPARIN SODIUM 5000 UNITS: 1000 INJECTION, SOLUTION INTRAVENOUS; SUBCUTANEOUS at 22:32:00

## 2022-01-01 RX ADMIN — MIDAZOLAM HYDROCHLORIDE 4 MG: 1 INJECTION INTRAMUSCULAR; INTRAVENOUS at 21:34:00

## 2022-01-01 RX ADMIN — SODIUM CHLORIDE: 9 INJECTION, SOLUTION INTRAVENOUS at 20:00:00

## 2022-01-01 RX ADMIN — CLINDAMYCIN PHOSPHATE 900 MG: 900 INJECTION INTRAVENOUS at 20:35:00

## 2022-01-03 NOTE — DISCHARGE SUMMARY
[facility] HOSPITALIST  DISCHARGE SUMMARY     Stephens Hodgkins Patient Status:  Inpatient    1955 MRN [id number]   [facility] 5NW-A Attending No att. providers found   Hosp Day # 2 PCP PHYSICIAN NONSTAFF     Date of Admission: 2021 descriptions):  • NOne    Lab/Test results pending at Discharge:   · N/A    Consultants:  • ID- Dr. Garcia [place]    Discharge Medication List:     Discharge Medications      START taking these medications      Instructions Prescription details   guaiFENesin ER 6 fluticasone-umeclidin-vilant      Inhale 1 puff into the lungs daily. Quantity: 3 each  Refills: 3     Ventolin  (90 Base) MCG/ACT Aers  Generic drug: albuterol      Inhale 2 puffs into the lungs every 4 (four) hours as needed.    Refills: 3     al

## 2022-01-05 NOTE — DISCHARGE SUMMARY
[facility] HOSPITALIST  DISCHARGE SUMMARY     Suzanna Carrera Patient Status:  Observation    1955 MRN [id number]   [facility] 4NW-A Attending No att. providers found   [address] Day # 1 PCP PHYSICIAN NONSTAFF     Date of Admission:  nightly. Quantity: 30 tablet  Refills: 1     benzonatate 200 MG Caps  Commonly known as: TESSALON      Take 1 capsule (200 mg total) by mouth 3 (three) times daily as needed for cough.    Quantity: 30 capsule  Refills: 3     clopidogrel 75 MG Tabs  Common fluticasone-umeclidin-vilant      Inhale 1 puff into the lungs daily. Quantity: 3 each  Refills: 3     Ventolin  (90 Base) MCG/ACT Aers  Generic drug: albuterol      Inhale 2 puffs into the lungs every 4 (four) hours as needed.    Refills: 3     al

## 2022-03-02 NOTE — ED QUICK NOTES
Warm blankets provided to patient. Patient requests neb treatment. Lungs with minor expiratory wheezing in all fields. Spoke with violet SALES to order DuoNeb.

## 2022-03-02 NOTE — ED QUICK NOTES
Round on patient. Ativan IV administered per MD order. Updated that respiratory was waiting for COVID result which has just resulted. RT was contacted to administer neb treatment.  Patient repositioned in bed per request.

## 2022-03-27 PROBLEM — I46.9 CARDIAC ARREST (HCC): Status: ACTIVE | Noted: 2022-01-01

## 2022-03-27 PROBLEM — J96.02 ACUTE RESPIRATORY FAILURE WITH HYPOXIA AND HYPERCARBIA (HCC): Status: ACTIVE | Noted: 2022-01-01

## 2022-03-27 PROBLEM — J96.01 ACUTE RESPIRATORY FAILURE WITH HYPOXIA AND HYPERCARBIA (HCC): Status: ACTIVE | Noted: 2022-01-01

## 2022-03-27 PROBLEM — E87.2 ACIDOSIS: Status: ACTIVE | Noted: 2022-01-01

## 2022-03-27 PROBLEM — E87.2 LACTIC ACIDOSIS: Status: ACTIVE | Noted: 2018-01-24

## 2022-03-28 NOTE — OPERATIVE REPORT
Bronchoscopy Procedure Report     Preop diagnosis:       Respiratory failure  Postop diagnosis:      Respiratory failure  Procedure performed: Bronchoscopy, Diagnostic  Bronchoscopy, Therapeutic  Bronchoalveolar lavage, BAL     Sedation used:          Fentanyl, propofol     Description of procedure: Informed consent was obtained. Oxygen applied via ETT. Bronchoscope was inserted via ETT. Trachea appeared . Kaylnee appeared sharp. Mucous membranes appeared friable. There were milky secretions eminating from the bilateral lower lobes. Left and right lung were inspected to the subsegmental level. No lesions seen:      BAL of lingula completed. 60cc of sterile saline instilled, 10cc of fluid aspirated back      Samples obtained:                   BAL of Lingula        Post procedure CXR necessary? no  Follow up:       Cultures, cell counts     Patient tolerated the procedure well. EBL was 0.

## 2022-03-28 NOTE — PROGRESS NOTES
03/28/22 0331   Clinical Encounter Type   Visited With Health care provider  (RN reported family had gone home.   No need for ministry at ths time.)   Continue Visiting No   Referral From    Referral To Nurse  (Please page [phone] if/when family requests further support.)

## 2022-03-28 NOTE — PROCEDURES
[facility] CARDIOLOGY  AMELIA Note    Keily Staley Location:      MRN [id number]   Admission Date 3/27/2022 Operation Date 3/27/2022   Attending Physician Rose Rai DO Operating Physician Guero Zavaleta MD     Pre-Operative Diagnosis: Status asthmaticus. Failure to ventilate. Preop VV ECMO. Post-Operative Diagnosis: Complex descending thoracic aortic dissection. Procedure Performed: AMELIA, Intra-Op prior to VV ECMO. AMELIA done in the hybrid operating room under general endotracheal tube anesthesia. AMELIA probe placed by Dr. Berneda Osler. Views were obtained from the standard imaging windows and were of excellent quality. Summary of Case: Normal cardiac chamber sizes. Vigorous, normal RV and LV systolic function. No pericardial effusion. Trileaflet aortic valve, opens normally in systole. Mitral and tricuspid valves appear anatomically normal.  No pericardial effusion. Highly mobile atrial septum. Normal-appearing left atrial appendage. Doppler echo with color-flow shows mild mitral regurgitation, trivial-mild aortic regurgitation and mild tricuspid regurgitation. Assuming normal hemoglobin and normal right atrial mean pressure, PA systolic pressures are only mildly elevated, 40-45 mmHg. Ascending aorta appears normal in caliber. No pathology noted. In the descending thoracic aorta, there is a complex dissection noted, associated with aneurysmal dilation. There are multiple septations. In the false lumen, there are multiple areas of chronic appearing thrombus. The dissection is visualized from the proximal descending aorta at the level of the left subclavian vein down to the level of the celiac artery origin. AMELIA visualization beyond this level was not possible. Conclusion complex descending thoracic aortic dissection with aneurysm as described above. There may be both acute and chronic components to this dissection.     Guero Zavaleta MD  3/27/2022  9:28 PM

## 2022-03-28 NOTE — OPERATIVE REPORT
CARDIAC SURGERY OPERATIVE REPORT    Date of Service: 3/27/2022    Surgeon: Tylor Drake MD, MBA  Assistant: Shaniqua Brumfield PA-C    Preoperative Diagnosis:   1. Acute hypercarbic respiratory failure  2. Acute Type B Aortic dissection with malperfusion syndrome    Postoperative Diagnosis:  Same    Procedure:   Femoral-femoral veno-venous ECMO cannulation (22Fr R fem; 18Fr L fem)    Intraoperative AMELIA:  Prior to ECMO cannulation, AMELIA examination revealed descending aortic dissection. Report as per Dr. Jose Elias Mayer. Indication: 60 yo F. Acute respiratory arrest. Intubated and sedated on my exam. Taken to hybrid OR for VV ECMO due to profound hypercarbia with PCO2 > 100, respiratory and metabolic acidosis. Notably, able to oxygenate effectively but unable to ventilate with peak pressures > 50, TV . Description: Patient was positioned supined with chest, abdomen and extremities prepared and draped in a standard fashion. Time out performed. The femoral veins were accessed under ultrasound and AMELIA guidance and 6Fr sheaths placed. 5000 U systemic heparin was given. The access sites were dilated serially over an Amplatz wire. A 22Fr cannula was passed to the cavoatrial junction via the right femoral vein. An 18Fr short cannula was passed into the left femoral vein and hubbed to the skin. Wet to wet connection was established. Placement was confirmed under fluoroscopy. Flows were set at 2.5 L. Serial ABGs were drawn with improvement in pH from 7.1 to 7.25, PCO2 from > 100 to 70.  2 u pRBC were given for hypotension and Hgb of 7.0. Cannulas were secured with silks every 1-2cm and dressed. The patient was brought to the SICU in stable but guarded condition. Intraoperative consult was obtained from the vascular surgery service for evaluation of the descending aorta. Counts were correct at the end of the case. Nettie Drake MD [facility]  3/27/2022  21:35

## 2022-03-28 NOTE — PROCEDURES
[address] [facility] in [place]  in affiliation with [facility]  [address]  [place], [address] [place] [address]  [phone]  Fax [phone]      Charly Dodson  9/27/1955    Date of testing:  3/28/2022  Ordering provider: Jesus Taylor MD    Reason for testing:  Cardiac arrest and unresponsive           ELECTROENCEPHALOGRAPHY     Using 10-20 International System digital recording: referential and bipolar montages were used for a routine EEG and study ultimately converted to ECS protocol for brain activity      At regular 7 uv/mm sensitivity, there was no discernable brain activity noted.   Sensitivity was increased to 2 uv/mm and EKG artifacts and room artifacts were noted but still very difficult to ascertain any background activity    NO change in EEG pattern with external stimuli        IMPRESSION:  Findings consisted with markedly attenuated background with electro-cerebral inactivity        Sandip Reynolds MD  Vascular & General Neurology  Director, Multiple Sclerosis Program  [facility]  3/28/2022, Time completed 2:06 PM

## 2022-03-28 NOTE — ANESTHESIA PROCEDURE NOTES
Central Line  Performed by: Valentina Catalan MD  Authorized by: Valentina Catalan MD     General Information and Staff    Procedure Start:  3/27/2022 8:30 PM  Procedure End:  3/27/2022 8:40 PM  Anesthesiologist:  Valentina Catalan MD  Performed by:   Anesthesiologist  Patient Location:  OR  Indication: central venous access and CVP monitoring    Site Identification: real time ultrasound guided and image stored and retrievable    Preanesthetic Checklist: 2 patient identifiers, IV checked, risks and benefits discussed, monitors and equipment checked, pre-op evaluation, timeout performed, anesthesia consent and sterile technique used    Procedure Detail    Patient Position:  Trendelenburg  Laterality:  Right  Site:  Internal jugular  Prep:  Chloraprep  Catheter Size:  9 Fr  Catheter Type:  MAC introducer  Number of Lumens:  Double lumen  Procedure Detail: target vein identified, needle advanced into vein and blood aspirated and guidewire advanced into vein    Seldinger Technique?: Yes    Intravenous Verification: verified by ultrasound and venous blood return    Post Insertion: all ports aspirated, all ports flushed easily, guidewire was removed intact, line was sutured in place and dressing was applied      Assessment    Events: patient tolerated procedure well with no complications      PA Catheter Placement    PA Catheter Placed?: No      Additional Comments

## 2022-03-28 NOTE — PROGRESS NOTES
[facility] Pharmacy Note:  Renal Adjustment for meropenem (MERREM)    Zayra Vergara is a 61year old patient who has been prescribed meropenem (MERREM) 500 mg every 8 hrs. The estimated creatinine clearance is 23.7 mL/min (A) (based on SCr of 1.98 mg/dL (H)). The dose has been adjusted to meropenem (MERREM) 500 mg every 12 hrs per hospital renal dose adjustment protocol for treatment of pneumonia. Pharmacy will follow and adjust dose as warranted for additional renal function changes.     Thank you,    Manuel Potts, PharmD  3/28/2022  7:34 AM

## 2022-03-28 NOTE — PLAN OF CARE
Patient received orally intubated ACPC 15/15/100%/+5. No withdrawal to painful stimuli. No cough/gag/corneals. No spontaneous breaths above ventilator noted. Propofol and fentanyl stopped. Seizure like movements noted to all extremeties. Ativan given as needed per order. STAT head CT with contrast obtained. Neurology and neuro critical care consulted. GOC discussion at bedside with family. Palliative care consulted. [facility] notified. Bilateral soft wrist restraints removed due to no longer pulling at tubes/lines. ACT and EG7+ results relayed to Dr. Shona Olguin and Kourtney Ren [place]. ECMO settings adjusted per perfusionist. Samson remains patent, decreased urine output this evening. Family present at bedside throughout the day. Updated and agreeable with plan of care. Please refer to nursing assessments. Will endorse care to oncoming RN.

## 2022-03-28 NOTE — PLAN OF CARE
Unresponsive, prior to CVOR train of four 0/4 with dial at 10. Pupils fixed with right pupil irregular in shape. No changes since returning. Assisted down to CVOR, then from CVOR to CT to [facility] 23. (see results). Presently ACPC 15 100% 15 +5, tidal volumes are around 30 mL and pip is 20 (as expected). Wheezes continue to dominate lung fields (previously continuous neb, now is Q2H). #7.5/22. OG at 65 cm (replaced after return from CVOR) and still with bloody output (300 mL for shift). Has lost left pedal and tibial pulses (has popliteal via doppler), left foot cooler than right but not cold. Now with generalized non pitting edema + JVD. VV ECMO with right groin catheter slight oozing (contained within dressing) and left C/D/I (left is return to patient). Note are almost the same color. Earlier in the night suddenly desatted and started to caryl. EG7 obtained during this time and PO2 was 18. Perfusionist Jona was called, I explained what I saw (recirculation) and decreased flow while on the phone with him. Sats slowly came up and was given LR and albumin boluses (the boluses did the most good). Dr. Evelia Olivas came in and spent over an hour at the bedside, flows were increased. Initially planned to obtain ACT at 2 but cartridges were  and this was somewhat delayed, when done was still supratherapeutic. SCD to right leg. Levo on and off and when need arose Martin was started in it's place (tachycardia). BIS very low (lower than I would expect from from Propofol/Fentanyl at present rates) with good SQI and EMG of 25 - expressed concern for neurologic demise. Right cordis/triple lumen intact. A line intact with excellent waveform. See assessments and flowsheets for further data. As of  has a bounding pedal pulse to left.

## 2022-03-28 NOTE — OR NURSING
Dr Jaime Andrea, Dr Gregg Carlson, Dr Gita Todd performed AMELIA at 2125, pt transported to CT with assist from respiratory, ICU RN, perfusion, anesthesia at 2130. Pt returned to CVOR at 2159 for ECMO.

## 2022-03-28 NOTE — ANESTHESIA POSTPROCEDURE EVALUATION
[facility] Patient Status:  Inpatient   Age/Gender 61year old female MRN [id number]   [facility] 4SW-A Attending Owen Lindsay DO   Hosp Day # 1 PCP PHYSICIAN NONSTAFF       Anesthesia Post-op Note    EXTRACORPOREAL MEMBRANE OXYGENATION, transesophageal echocardiogram, see cath lab record    Procedure Summary     Date: 03/27/22 Room / Location: [address] [place] [address] / [address] [place] [address] CVOR    Anesthesia Start: 2000 Anesthesia Stop: 03/28/22 0001    Procedure: EXTRACORPOREAL MEMBRANE OXYGENATION, transesophageal echocardiogram, see cath lab record (Bilateral Groin) Diagnosis: (same)    Surgeons: Linda Vick MD Anesthesiologist: Jannet Buchanan MD    Anesthesia Type: general ASA Status: 5 - Emergent          Anesthesia Type: general    Vitals Value Taken Time   BP 90/68 03/28/22 0001   Temp 96 03/28/22 0001   Pulse 105 03/28/22 0001   Resp 10 03/28/22 0001   SpO2 97 03/28/22 0001       Patient Location: ICU    Anesthesia Type: general    Airway Patency: intubated    Postop Pain Control: adequate    Mental Status: Other    Nausea/Vomiting: Other    Cardiopulmonary/Hydration status: stable euvolemic    Complications: no apparent anesthesia related complications    Postop vital signs: stable    Dental Exam: Unchanged from Preop    Sign out given to ICU staff.

## 2022-03-28 NOTE — PROGRESS NOTES
03/28/22 2679   Clinical Encounter Type   Visited With Family  (Family indicated that there was not a need for  at this time.)   Routine Visit Follow-up   Continue Visiting No  (unless requested)   Spiritual care remains available via consult or at pager [phone].

## 2022-03-28 NOTE — ANESTHESIA PROCEDURE NOTES
Procedure Performed: AMELIA      Start Time:  3/27/2022 10:30 PM       End Time:   3/27/2022 10:45 PM    Preanesthesia Checklist:  Patient identified, IV assessed, risks and benefits discussed, monitors and equipment assessed, procedure being performed at surgeon's request and anesthesia consent obtained. General Procedure Information  Diagnostic Indications for Echo:  assessment of ascending aorta  Location performed:  OR  Intubated  Bite block placed  Heart visualized  Probe Insertion:  Easy  Probe Type:  Multiplane  Modalities:  2D only, color flow mapping, pulse wave Doppler and continuous wave Doppler        Anesthesia Information  Performed Personally  Anesthesiologist:  Rachana Leblanc MD      Post              Summary: Limited AMELIA, see cardiology report for further guidance. This AMELIA for wire placement of VV ECMO cannula. ECMO wire seen at level of RA in bicaval view. Position also confirmed with fluoro.   Complications:None

## 2022-03-28 NOTE — PROGRESS NOTES
03/27/22 2022   Clinical Encounter Type   Visited With Family  (Large and extended family at the bedside, highly emotional)   Routine Visit Introduction   Continue Visiting No  (As of 20:25 pt in OR, family went home. They plan to return in two hours. They will notify the  to request further ministry.)   Surgical Visit In surgery   Crisis Visit Critical care   Family Spiritual Encounters   Family Coping Fearful; Sadness; Anxiety   Family Participation in Care Consistently   Family Support During Treatment Consistently   Taxonomy   Methods Demonstrate acceptance;Encourage sharing of feelings;Encourage story-telling;Encouraging spiritual/Islam practices; Explore presence of God;Exploring hope; Offer emotional support; Offer spiritual/Islam support; Offer support   Interventions Acknowledge current situation; Acknowledge response to difficult experience; Active listening;[place];Provide compassionate touch;Provide hospitality; Respond as  to a defined crisis event; Share words of hope and inspiration

## 2022-03-28 NOTE — PROGRESS NOTES
Cardiac Surgery    In prep for VV ECMO, AMELIA placed. 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 In light of distended abdomen and lactic acidosis, taken for stat CTA with runoff. On review and awaiting official interpretation, no overt evidence of visceral vessel dissection for malperfusion with patent celiac and SMA, small FRANKLYN and no evidence of bowel ischemia though grossly distended colon. 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 Given persistent hypercapnia, will proceed with VV ecmo. Have discussed case with Dr. Lyn Garrett vascular surgery. Clifford ANTHONYA

## 2022-03-29 NOTE — PLAN OF CARE
Comatose, has no reflexes. Pupils fixed and 3/round. Presently ACPC 15 100% 15 +7.5, tidal volumes are around 90 mL. Rhonchi throughout lung fields, nebs are Q2H. #7.5/22. OG at 65 cm still with bloody output, no bowel sounds. 1+ pedal and radial pulses, feet equally cool. Generalized non pitting edema + JVD, especially edmea to face. VV ECMO with right groin catheter slight oozing (contained within dressing) and left is C/D/I (left is return to patient). Oozing blood from nose and mouth. Right catheter's blood is a little darker than left. Flows around 5L, temp manipulated on blanketrol to keep patient normothermic. Samson, bloody urine, oliguric. SCDs to bilateral legs. Maxed Nitroglycerine drip, called APN and discussed re: having facial tremor and shaking appearing like shivering (I believe is seizing). Discussed not treating last postassium given low urine output and rapidly rising creatinine. Fentanyl 50 mcg IV push given - BP from 190's to 160's and HR from 100's to 140's, 10 mg Labetelol given and BP drops to mid 80's and HR to low 100's - rapidly reduced nitroglycerine. Nicardipine has been started. A line intact with excellent waveform. See assessments and flowsheets for further data. BP has been extremely labile - see flowsheets for titrations. Urine output has dwindled. OG output is up.

## 2022-03-29 NOTE — PLAN OF CARE
Care assumed at 0730. Patient continuously monitored on telemetry. Vitals recorded every hour. Medications given per MAR. No neurological reflexes seen on assessment. In AM, pressures very labile and Neosynephrine and Cardene used to maintain parameters for SBP  per report. After discussing with all services, SBP to be maintained greater than 100, order entered. EG7 and ACT results communicated with CV PA and Dr. Olvin Almeida, no further orders received. Per discussion with PA, Nuc Med Scan to be held until patient's blood pressure is hemodynamically stable (not requiring switching between anti-hypertensive and pressor support as frequently). ECMO dressings reinforced , pulses all palpable and no chugging seen in lines during shift. Sweep adjusted to maintain CO2 between 45-50 per Dr. Joseph Barone. Patient's family at bedside in AM and updated with Plan of Care and education given as needed. Please see EPIC DocFlowsheet for further assessment information. Silvio Pruitt RN  Intensive Care Unit, [facility]      Problem: CARDIOVASCULAR - ADULT  Goal: Maintains optimal cardiac output and hemodynamic stability  Description: INTERVENTIONS:  - Monitor vital signs, rhythm, and trends  - Monitor for bleeding, hypotension and signs of decreased cardiac output  - Evaluate effectiveness of vasoactive medications to optimize hemodynamic stability  - Monitor arterial and/or venous puncture sites for bleeding and/or hematoma  - Assess quality of pulses, skin color and temperature  - Assess for signs of decreased coronary artery perfusion - ex.  Angina  - Evaluate fluid balance, assess for edema, trend weights  Outcome: Not Progressing     Problem: RESPIRATORY - ADULT  Goal: Achieves optimal ventilation and oxygenation  Description: INTERVENTIONS:  - Assess for changes in respiratory status  - Assess for changes in mentation and behavior  - Position to facilitate oxygenation and minimize respiratory effort  - Oxygen supplementation based on oxygen saturation or ABGs  - Provide Smoking Cessation handout, if applicable  - Encourage broncho-pulmonary hygiene including cough, deep breathe, Incentive Spirometry  - Assess the need for suctioning and perform as needed  - Assess and instruct to report SOB or any respiratory difficulty  - Respiratory Therapy support as indicated  - Manage/alleviate anxiety  - Monitor for signs/symptoms of CO2 retention  Outcome: Not Progressing     Problem: GASTROINTESTINAL - ADULT  Goal: Maintains or returns to baseline bowel function  Description: INTERVENTIONS:  - Assess bowel function  - Maintain adequate hydration with IV or PO as ordered and tolerated  - Evaluate effectiveness of GI medications  - Encourage mobilization and activity  - Obtain nutritional consult as needed  - Establish a toileting routine/schedule  - Consider collaborating with pharmacy to review patient's medication profile  Outcome: Not Progressing     Problem: METABOLIC/FLUID AND ELECTROLYTES - ADULT  Goal: Glucose maintained within prescribed range  Description: INTERVENTIONS:  - Monitor Blood Glucose as ordered  - Assess for signs and symptoms of hyperglycemia and hypoglycemia  - Administer ordered medications to maintain glucose within target range  - Assess barriers to adequate nutritional intake and initiate nutrition consult as needed  - Instruct patient on self management of diabetes  Outcome: Not Progressing  Goal: Electrolytes maintained within normal limits  Description: INTERVENTIONS:  - Monitor labs and rhythm and assess patient for signs and symptoms of electrolyte imbalances  - Administer electrolyte replacement as ordered  - Monitor response to electrolyte replacements, including rhythm and repeat lab results as appropriate  - Fluid restriction as ordered  - Instruct patient on fluid and nutrition restrictions as appropriate  Outcome: Not Progressing  Goal: Hemodynamic stability and optimal renal function maintained  Description: INTERVENTIONS:  - Monitor labs and assess for signs and symptoms of volume excess or deficit  - Monitor intake, output and patient weight  - Monitor urine specific gravity, serum osmolarity and serum sodium as indicated or ordered  - Monitor response to interventions for patient's volume status, including labs, urine output, blood pressure (other measures as available)  - Encourage oral intake as appropriate  - Instruct patient on fluid and nutrition restrictions as appropriate  Outcome: Not Progressing     Problem: HEMATOLOGIC - ADULT  Goal: Maintains hematologic stability  Description: INTERVENTIONS  - Assess for signs and symptoms of bleeding or hemorrhage  - Monitor labs and vital signs for trends  - Administer supportive blood products/factors, fluids and medications as ordered and appropriate  - Administer supportive blood products/factors as ordered and appropriate  Outcome: Not Progressing  Goal: Free from bleeding injury  Description: (Example usage: patient with low platelets)  INTERVENTIONS:  - Avoid intramuscular injections, enemas and rectal medication administration  - Ensure safe mobilization of patient  - Hold pressure on venipuncture sites to achieve adequate hemostasis  - Assess for signs and symptoms of internal bleeding  - Monitor lab trends  - Patient is to report abnormal signs of bleeding to staff  - Avoid use of toothpicks and dental floss  - Use electric shaver for shaving  - Use soft bristle tooth brush  - Limit straining and forceful nose blowing  Outcome: Not Progressing     Problem: MUSCULOSKELETAL - ADULT  Goal: Return mobility to safest level of function  Description: INTERVENTIONS:  - Assess patient stability and activity tolerance for standing, transferring and ambulating w/ or w/o assistive devices  - Assist with transfers and ambulation using safe patient handling equipment as needed  - Ensure adequate protection for wounds/incisions during mobilization  - Obtain PT/OT consults as needed  - Advance activity as appropriate  - Communicate ordered activity level and limitations with patient/family  Outcome: Not Progressing     Problem: NEUROLOGICAL - ADULT  Goal: Achieves stable or improved neurological status  Description: INTERVENTIONS  - Assess for and report changes in neurological status  - Initiate measures to prevent increased intracranial pressure  - Maintain blood pressure and fluid volume within ordered parameters to optimize cerebral perfusion and minimize risk of hemorrhage  - Monitor temperature, glucose, and sodium.  Initiate appropriate interventions as ordered  Outcome: Not Progressing

## 2022-03-30 NOTE — PROGRESS NOTES
CARDIAC SURGERY    Seen and examined at bedside. Images reviewed - diffuse axonal injury indicative of severe anoxia. Failed sweep 0 trial this am - pH 7.1 pCO2 > 70    Discussed in detail with Mrs. Salazar's family regarding current condition and grim prognosis. Even with stability without ECMO support, suspect she would have absolutely zero functional independence and poor quality of life. Continue sweep at lowest tolerable. Awaiting further assessment for neurologic function and/or brain death. Aliza Brito MD, MARTINEZ

## 2022-03-30 NOTE — PROGRESS NOTES
[facility] Pharmacy Note:  Renal Adjustment for meropenem (MERREM)    Alona Centeno is a 61year old patient who has been prescribed meropenem (MERREM) 500 mg every 12 hrs. The estimated creatinine clearance is 6.4 mL/min (A) (based on SCr of 7.35 mg/dL (H)). The dose has been adjusted to meropenem (MERREM) 500 mg every 24 hrs per hospital renal dose adjustment protocol for treatment of pneumonia. Pharmacy will follow and adjust dose as warranted for additional renal function changes.     Thank you,    Lina Perez, PharmD  3/30/2022  9:29 AM

## 2022-03-30 NOTE — PLAN OF CARE
Comatose, has no reflexes. Pupils fixed and 3/round and slightly disconjugate gaze. Do note random agonal type attempt at breath infrequently at irregular intervals (not in response to anything). No facial twitching/extremity tremors tonight. Still ACPC 15 100% 15 +7.5, tidal volumes have improved to around 150 mL. Coarse inspiratory as well as fine expiratory whezes throughout lung fields, nebs are Q2H. #7.5/22. OG at 65 cm still with bloody output, no bowel sounds. 1+ pedal and radial pulses, feet equally warm. Generalized non pitting edema + JVD, especially edmea to face. VV ECMO with right groin catheter oozing and left is C/D/I (left is return to patient). Bleeding from mouth. Right ECMO catheter's blood is a little darker than left. Flows around 5.1L, sweep has been decreased to 4, blanketrol in ECMO oxygenator to maintain normothermia. Samson, brown cloudy urine, profoundly oliguric. SCDs to bilateral legs. Low dose phenylephrine to keep SBP >100. BP much less labile presently. A line intact with excellent waveform. Possible SPECT in nuc med tomorrow. [facility] following. See assessments and flowsheets for further data. As night has progressed note increasing frequency of inspiratory movements. At times will make as many as one inspiratory movement per every 5 min occasionally making ventilator fTOT read 17 (recordered). Also note tidal volumes improving - 125-175 (higher with inspiratory movements). Pupillometer obtained - R 3.98, L 3.86, NPi 0 bilaterally.

## 2022-03-30 NOTE — PLAN OF CARE
Pt vented, on ECMO-received on sweep of 5, 100%. Tolerating well. Per CV sx will attempt to wean off ecmo so that apnea test can be performed. If unable to wean ecmo plan for SPECT scan today. Tolerating ecmo weaning thus far this morning. Less oral bleeding noted but still some clots suctioned out. OG to LIS w/ bile drainage. No UO. K addressed per renal. Cont to have expiratory wheezes. Getting sched IV steroids and q2 hr nebs. No cough, gag, or corneal reflexes. No resonse to painful stimuli and pupils nonreactive. Remains on low dose jose luis to keep sbp >100. bp labile ranging from mid 20y-508m-egc titrated as needed. Family at , discussed care w/ cv sx and critical care. Pt noted to have very rare spontaneous agonal breaths. Per  pt must have no spontaneous breaths x24hrs and at that time brain death can be assessed through brain scan in nuc med or per neuro apnea test may also be performed while on ECMO if sweep <3 and CO2 <40. CV and critical care updated.

## 2022-03-30 NOTE — PALLIATIVE CARE NOTE
Chart reviewed, palliative care aware of consult. Will allow testing and follow up from neurologist prior to palliative care consult.      [address]  Palliative care  [phone]

## 2022-03-30 NOTE — PLAN OF CARE
After discussion w/ , pt's dtr wishes to pursue dialysis and continue aggressive care despite neurological status. States she is \"hoping for a miracle\" and her mom would \"want to keep fighting\". HD line placed at bs and CRRT to start this evening.

## 2022-03-31 NOTE — PLAN OF CARE
Care assumed at 0730. On beginning of shift start, cranking noise heard from ECMO console and flows decreased to zero. Oxygenator switched to hand crank and cranking immediately started. Perfusion called and at bedside for circuit change, changed without complication. Throughout shift, CRRT running with no complications; per Dr. Basil Wiley, UF to be 25mL/hr if tolerated without increase in pressor requirement. CBC, ACT and aPTT results communicated and orders completed as received. Dr. Jovita Preston updated on shift events and iStat labs; per MD, ACT to be checked q6 and titrated by 100 units/hr to maintain -180 and platelets to be checked on AM labs, transfuse platelets for under 40 or signs of bleeding. Patient's daughter updated with Plan of Care and education given as needed. Please see EPIC DocFlowsheet for further assessment information.       Yolanda Willams RN  Intensive Care Unit, [facility]

## 2022-03-31 NOTE — PLAN OF CARE
Report received from previous RN. Pt comatose, no sedation. No reflexes, No gag/cough/corneal rx. Pupils Fixed 4 non reactive, round and slightly disconjugate gaze. Random agonal breath infrequently. BIS monitor no reading or picking any reading changed the BISS and reading of  0. Right at the beginning of shift with issues with BP while CRRT starting  Vented on AC/PC RR 24, PEEP+ 7.5,  FIO2 80% Lungs coarse inspiratory wheezing wit Neb Q 2,   Oral bloody secretions and ETT. VV ECMO with R groin catheter with old drainage (outflow side) L groin catheter D/I ( inflow side)   Pump 5.05 L/ Sweep 2.5 RRPM 3370 and FIO2 100%  No chugging  Cardiac monitor SR/ST on Phenylephrine gtt for a goal of SBP >100. RIght Sherry with good wave form  Abdomen soft and hypoactive BS. No gas/ no BM/Samson with zero output brown cloudy output. OG at 65 cm with bile/brown color drainage with profound oliguric. Esophageal temp probe 97.1 temps  HD line placed today and right at the beginning of shift CRRT started  CVV[facility] Goal blood flow rate 250/ Fluid removal of 0, Replacement fluid rate 2.5 l/hr  slight decreased of BP when started increased per protocol of the goal of BP  Spoke with Keely GARCIA and updates given and at the bedside and ordered Vasopressin gtt. Updated her daughter Elsie Kuo.    0630 Updated Dr. Hebert Banks with morning labs and would like to start heparin gtt for a goal of 140-180  Have the perfusinust draw a post oxygenator gas this morning   0740 Plts are 56 this morning  Updated Dr. Hebert Banks and will proceed with Heparin gtt at 500 u/ hr.  No Plts transfusion unless bleeding or count below 40

## 2022-03-31 NOTE — PALLIATIVE CARE NOTE
Spoke with Dr. Iain Kenny about palliative care consult. Family has been medically updated and aware of prognosis. At this time palliative care consult is cancelled.      [address]  Palliative care  [phone]

## 2022-04-01 NOTE — PROGRESS NOTES
Brain death note    Mrs. Tammie Mendez presented to the hospital with cardiac arrest on 3/27. She was in shock with significant acidosis on arrival. The patient was unable to be ventilated secondary to presumed bronchospasm and ultimately required VV ECMO to sustain life. 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 She experienced eventual renal failure requiring initiation of dialysis. CTA of the brain demonstrated loss of gra-white matter differentiation and hypoattenuation consistent with diffuse anoxic injury. Neurology was consulted as the patient had no meaningful interaction despite being off all sedation with correction of her electrolyte derangements. Mrs. Tammie Mendez was seen by neurology today and was determined to have absent brainstem reflexes (no pupillary response to light, no ocular movement, lack of response to pain, no corneal reflex, no cough, no gag). Cold caloric testing showed no deviation of the eyes. I repeated testing this evening and confirmed no pupillary response, negative cold calorics, no cough, no gag, no corneal reflex. She did not withdraw to pain or respond to stimuli. The patient at present has no electrolyte derangements. She is normothermic and normotensive. When disconnected from the ventilator she has no spontaneous breaths. We are unable to perform a formal apnea test as the patient is on VV ecmo therefore the pCO2 will not rise in response to apnea. She has no EEG activity (3/28). The nuclear medicine brain flow scan (4/1) demonstrated no intracranial blood flow.        Time of death: 6:43pm     Francisco Robertson MD

## 2022-04-01 NOTE — PLAN OF CARE
2200  Heparin remains at 500Units/hour. Received pt no sedation, Pupils remain 5 and fixed, no cough,gag corneal reflex noted. Breathing with the vent. BIS 0 with 100 SQI, On pressure control. Volumes 100-140's, After suction volumes decrease to 80's takes a few minutes to get volumes to baseline. airway pressure 20's, lungs diminished with ins/exp wheeze. Minimal to no inline secretions, bloody oral secretions,  ST, remains on Martin. Right artie intact. Right HD cath and cordis with TLC intact. Pt remains on VV ECMO 100% blended O2, Sweep 2.5 RPM 3300, Flows 5.10-5.17, sites intact, no chugging, OGT to LCS, brown output , abd soft, bs hypoactive, inc of soft brown stool. Samson with no urine output, irrigated, remains on CRRT 2.5L, 250 blood flow, with 25ml hourly fluid removal. Accucheck q6h. HS care provided for.     0400  increase Heparin increased to 600 units/hour will recheck at 0600. AM platelets 38 called JOSE Bravo. Updated on goal of greater than 40 . Platelets ordered. Consent in chart. Noted right groin ECMO site oozing ,initially old blood. Dressing change with quick clot placed.

## 2022-04-01 NOTE — PLAN OF CARE
Pt vented, 75% FiO2. Pulling 100-140cc Tv. breath sounds on left absent in some areas and insp and exp wheezes bilaterally. Ecmo w/ sweep 4, blend 100%. CRRT w/ UF removal of 25cc/hr. On jose luis (90mcg) to maintain sbp 100-160 per neuro. No urine output noted. On heparin gtt, titrated for -180- checked q6hr. Plt and hgb >goals of 40 and 7 respectively, plan to repeat cbc this afternoon. Bleeding moderately orally and from nose. BIS monitor in place, noted to have reading of 0 since last noc. No spontaneous breaths noted. Neuro at bs, pupils fixed and pt cont to have no cough, gag, or corneal reflexes. brain perfusion study to r/o brain death ordered for this afternoon. Updated dtr and discussed that perfusion study would be done today unless she would prefer to w/d care d/t pt's poor prognosis. She wishes to pursue brain scan. Test scheduled for 1630. CRRT stopped for transport, blood returned. Will transport to [facility] w/ perfusionist, RT and 2 rns.

## 2022-04-02 NOTE — PROGRESS NOTES
04/01/22 2018   Clinical Encounter Type   Visited With Health care provider   Routine Visit Introduction   Continue Visiting No   Responded to nurses' pager. Patient is declared technically brain dead. Some family have different understanding about it. Nurse advised  not to visit with them at this moment.  will remain available at pager [phone] when needed.

## 2022-04-02 NOTE — PROGRESS NOTES
Received pt after Dr Melonie Craig spoke with family. Offered emotional support. Family declined reece at this time. Gift of hope at bedside. Daughter refused donation. Daughter upset, states she feels rushed. Family requested if we can remove all support after midnight d/t close relatives birthday today. Reinforced that pt was declared on today's date. Spoke with Ed perfusionist to updated to arrive at midnight. Called daughter twice to explain tonight's events. Family all left around . I called the daughter to recommend that this is the time to spend with her mother. Answered all questions. RT made aware. Family is returning with a Felicia Stakes to pray. Family arrived around midnight with . Perfusionist, RT and I stopped meds, extubated and turned off ECMO. Pt went asystolic at 3314 with family at bedside. Dentures placed in pt per family request instructed by  home.

## 2022-04-02 NOTE — PROGRESS NOTES
04/01/22 1804   Attending [id number] Sunil Martinez   Attending Physician Notified Y   Attending Physician Name [address]   Date of Brain Death 04/01/22   Time of Brain Death 1843   Pronounced by Physician   Physician name Renae Maciel   Family member or legal guardian notified? Yes   Gift of Hope Notified Yes     Dr. Kevin Crane and Akash Baires notified of brain perfusion study. Akash Baires at bedside and spoke w/ dtr Consuella Ashing regarding brain death. Pt pronounced @ 1843. Multiple family members at bs. Care continued pending conversation between family and gift of hope. Care endorsed to oncoming RN.

## 2022-04-03 NOTE — PAYOR COMM NOTE
Discharge Notification    Patient Name: Dennis Moffett: Good Sanders #: [id number]  Authorization Number: [id number]  Admit Date/Time: 3/27/2022 4:10 PM  Discharge Date/Time: 2022 2:00 AM

## 2022-04-05 LAB
BASE EXCESS BLDA CALC-SCNC: 3.3 MMOL/L (ref ?–2)
BODY TEMPERATURE: 98.6 F
CA-I BLD-SCNC: 1.05 MMOL/L (ref 0.95–1.32)
COHGB MFR BLD: 2 % SAT (ref 0–3)
FIO2: 100 %
HCO3 BLDA-SCNC: 27.5 MEQ/L (ref 21–27)
HGB BLD-MCNC: 11.1 G/DL
LACTATE BLD-SCNC: 1.6 MMOL/L (ref 0.5–2)
METHGB MFR BLD: 1.3 % SAT (ref 0.4–1.5)
OXYHGB MFR BLDA: 96.7 % (ref 92–100)
PAW @ PEAK INSP FLOW SETTING VENT: 22.5 CM H2O
PCO2 BLDA: 45 MM HG (ref 35–45)
PEEP: 7.5 CM H2O
PH BLDA: 7.41 [PH] (ref 7.35–7.45)
PO2 BLDA: 116 MM HG (ref 80–100)
POTASSIUM BLD-SCNC: 5.1 MMOL/L (ref 3.6–5.1)
SODIUM BLD-SCNC: 147 MMOL/L (ref 135–145)
VENT RATE: 15 /MIN

## 2023-04-26 NOTE — RESPIRATORY THERAPY NOTE
Pt pulle doff BiPAP after ABG was drawn. Pt placed on neb treatment. Pt refused to put BIPAP back on.  Pt placed on Cont neb per MD orders Melolabial Interpolation Flap Text: A decision was made to reconstruct the defect utilizing an interpolation axial flap and a staged reconstruction.  A telfa template was made of the defect.  This telfa template was then used to outline the melolabial interpolation flap.  The donor area for the pedicle flap was then injected with anesthesia.  The flap was excised through the skin and subcutaneous tissue down to the layer of the underlying musculature.  The pedicle flap was carefully excised within this deep plane to maintain its blood supply.  The edges of the donor site were undermined.   The donor site was closed in a primary fashion.  The pedicle was then rotated into position and sutured.  Once the tube was sutured into place, adequate blood supply was confirmed with blanching and refill.  The pedicle was then wrapped with xeroform gauze and dressed appropriately with a telfa and gauze bandage to ensure continued blood supply and protect the attached pedicle.

## 2023-11-23 NOTE — PROGRESS NOTES
ECMO weaning trial:    I was present at bedside for ECMO weaning trial.  Sweep was decreased to 2.5 (full ECMO settings - flow 5.1 RPM: 3370  Sweep: 2.5  FiO2: 100%) and ventilator settings were optimized increasing RR to 24.  30 minutes later ABG was obtained: 7.33/37.7/151. Patient's sweep was then further decreased to 0  (full ECMO settings: flow: 5.1  RPM: 3370  Sweep; 0  FiO2: 100%)  30 minutes later ABG was obtained:  7.16/78.8/110. Unable to further optimize ventilator settings given development of auto PEEP when rate is further increased. Patient not ready to wean from ecmo. Sweep increased back to 2.5. Reviewed above findings with CT surgery APN as well as family. Additional critical care time: 45 minutes       YAAKOV Amaya MD Ng- OB resident, [facility] RN, Carla- respiratory therapist

## 2024-11-14 NOTE — PROGRESS NOTES
[facility] Pharmacy Note:  Renal Adjustment for oseltamivir (TAMIFLU)    Imelda Pena is a 63year old female who has been prescribed oseltamivir (TAMIFLU) 75 mg every 12 hrs.   There has been an increase in the estimated creatinine clearance is 62.2 mL/min ( Will express feelings associated with suicidal ideation Will identify and utilize 2 coping skills Be able to state 3 reasons for living Will identify and utilize 2 coping skills Will identify and utilize 2 coping skills Be able to state 3 reasons for living Will identify 1 trigger / stressor that exacerbates suicidal

## (undated) DEVICE — TIBURON DRAPE TOWELS: Brand: CONVERTORS

## (undated) DEVICE — GLOVE SURG TRIUMPH SZ 8

## (undated) DEVICE — MEDI-VAC SUCTION HANDLE REGULAR CAPACITY: Brand: CARDINAL HEALTH

## (undated) DEVICE — Device: Brand: VASCULAR DILATOR KIT

## (undated) DEVICE — GUIDEWIRE .035X260 AMPL 46-526

## (undated) DEVICE — SOL  .9 1000ML BTL

## (undated) DEVICE — PINNACLE INTRODUCER SHEATH: Brand: PINNACLE

## (undated) DEVICE — Device

## (undated) DEVICE — PROXIMATE RH ROTATING HEAD SKIN STAPLERS (35 WIDE) CONTAINS 35 STAINLESS STEEL STAPLES: Brand: PROXIMATE

## (undated) DEVICE — TOWEL SURG OR 17X30IN BLUE

## (undated) DEVICE — OPTISITE ARTERIAL PERFUSION CANNULA: Brand: OPTISITE ARTERIAL PERFUSION CANNULA

## (undated) DEVICE — AMPLATZ SS STR TIP 1CM .035X26

## (undated) DEVICE — FIXED CORE WIRE GUIDE SAFE-T-J, CURVED: Brand: COOK

## (undated) DEVICE — CSTM UNIVERSAL DRAPE PK: Brand: MEDLINE INDUSTRIES, INC.

## (undated) DEVICE — BARD® PTFE FELT PLEDGETS, (OVAL), 4.8 MM X 6 MM: Brand: BARD® PTFE FELT PLEDGETS

## (undated) DEVICE — 3M™ IOBAN™ 2 ANTIMICROBIAL INCISE DRAPE 6651EZ: Brand: IOBAN™ 2

## (undated) DEVICE — OXYGENATOR QUADROX TAND HEART

## (undated) DEVICE — HEMOCLIP MED 24 CLIP/CARTRIDGE

## (undated) DEVICE — HEMOCLIP HORIZON SM MULTI

## (undated) DEVICE — ANGIO PACK-LF: Brand: MEDLINE INDUSTRIES, INC.

## (undated) DEVICE — GAUZE SPONGES,12 PLY: Brand: CURITY

## (undated) DEVICE — TRANSPOSAL ULTRAFLEX DUO/QUAD ULTRA CART MANIFOLD

## (undated) DEVICE — ECLIPSE GOWN PACK: Brand: MEDLINE INDUSTRIES, INC.

## (undated) DEVICE — STERILE POLYISOPRENE POWDER-FREE SURGICAL GLOVES: Brand: PROTEXIS

## (undated) NOTE — IP AVS SNAPSHOT
[address]            (For Outpatient Use Only) Initial Admit Date: 12/19/2021   Inpt/Obs Admit Date: Inpt: 12/19/21 / Obs: N/A   Discharge Date:    Dary Baxter:  [de-identified]   MRN: [de-identified]   CSN: [id number]   CEID: [id number] Subscriber: SELF   TERTIARY INSURANCE   Payor:  Plan:    Group Number:  Insurance Type:    Subscriber Name:  Subscriber :    Subscriber ID:  Pt Rel to Subscriber:    Hospital Account Financial Class: Medicare    2021

## (undated) NOTE — ED AVS SNAPSHOT
Bryanna Garcia   MRN: [id number]    Department:  [facility] Emergency Department   Date of Visit:  5/29/2018           Disclosure     Insurance plans vary and the physician(s) referred by the ER may not be covered by your plan.  Please contact y tell this physician (or your personal doctor if your instructions are to return to your personal doctor) about any new or lasting problems. The primary care or specialist physician will see patients referred from the [facility] Emergency Department.  Geovanna Oconnell

## (undated) NOTE — LETTER
Date: 3/30/2022    Patient Name: Terry Aschoff          To Whom it may concern: The above patient was seen at the [facility] for treatment of a serious medical condition. This letter has been written at the request of Capo Rogers whom is the grandson of Manuel Lafleur. Capo Rogers should be excused from attending school in order to be with family at this time.       Sincerely,    Anne-Marie Bender RN, MSN, CCRN

## (undated) NOTE — Clinical Note
EMILY, TCM call made, see notes. NCM confirmed with patient that she has a HFU on 11/14/19. NCM changed visit type to TCM HFU. Also Patient states that while in the hospital she was getting nebulizer treatments with a mask which worked better for her.  Ryne

## (undated) NOTE — IP AVS SNAPSHOT
Patient Demographics     Address  [address] [place] [address] Phone  [phone] (Home) *Preferred*  [phone] [facility])      Emergency Contact(s)     Name Relation Home Work [address] [facility] Daughter [phone] by mouth daily. DAVIDA Dobbs         atorvastatin 10 MG Tabs  Commonly known as: LIPITOR  Next dose due: Tonight 5/30      Take 1 tablet (10 mg total) by mouth nightly. DAVIDA Chambers         B-1 100 MG Tabs  Next dose due:  Tomorrow 5/3 Commonly known as: PROTONIX  Next dose due: Tomorrow 5/31      Take 40 mg by mouth every morning before breakfast.          predniSONE 20 MG Tabs  Commonly known as: DELTASONE  Next dose due:  Tomorrow 5/31      Take 2 tablets on days 1-2 Take 1 tablet on Furoate-Vilanterol (BREO ELLIPTA) 200-25 MCG/INH inhaler 1 puff 05/30/21 0817 Given      [id number] HYDROcodone-acetaminophen (NORCO)  MG per tab 1 tablet 05/29/21 1626 Given      [id number] HYDROcodone-acetaminophen (NORCO)  MG per tab 1 tablet Source Collected On   Blood — 05/29/21 1401          Components    Component Value Reference Range Flag Lab   D-Dimer <0.27 <0.63 ug/mL FEU — [facility])   Comment:        FEU = Fibrinogen Equivalent Units.      D-Dimer results within the reference ran Collected: 05/26/21 2216    Order Status: Completed Lab Status: Final result Updated: 05/26/21 2253    Specimen: Other from Nares      Rapid SARS-CoV-2 by PCR Not Detected         H&P - H&P Note      H&P signed by Frankie Patrick MD at 5/27/2021  4:49 AM  V essential hypertension         Past Surgical History:   Past Surgical History:   Procedure Laterality Date   • TOTAL ABDOM HYSTERECTOMY         Social History:  reports that she has been smoking cigarettes. She started smoking about 51 years ago.  She has b needed. , Disp: , Rfl: 3  albuterol Sulfate (5 MG/ML) 0.5% Inhalation Nebu Soln, Inhale 5 mg into the lungs as needed for Wheezing., Disp: , Rfl:   Cyclobenzaprine HCl 10 MG Oral Tab, Take 10 mg by mouth 3 (three) times daily as needed.   , Disp: , Rfl:   Pa Musculoskeletal: Moves all extremities. Extremities: No edema or cyanosis. Integument: No rashes or lesions. Psychiatric: Appropriate mood and affect.       Diagnostic Data:      Labs:  Recent Labs   Lab 05/26/21  2216   WBC 6.5   HGB 14.1   MCV 90.7 Consults - MD Consult Notes      Consults signed by Marlee Dixon MD at 5/28/2021  1:35 PM     Author: Marlee Dixon MD Service: Paras Rosario Author Type: Physician    Filed: 5/28/2021  1:35 PM Date of Service: 5/28/2021  1:27 PM Status: Signed best seen on image 18 series 5 measuring up to 1.8 x 1.0 cm is compatible with an area of acute ischemia/infarction.  This area demonstrates T2/FLAIR hyperintensity.       2.  There are moderate chronic microvascular ischemic changes in the cerebral white m Environment / Accessibility: split level / walkout  Current Functional Status:  Max A      Past Medical History:  @Medical hx@  Past Medical History:   Diagnosis Date   • Acute respiratory failure ([facility] [address]    • Alcohol abuse    • Anxiety    • Asthma    • Back infusion, , Intravenous, Continuous  acetaminophen (TYLENOL) tab 650 mg, 650 mg, Oral, Q4H PRN   Or  acetaminophen (TYLENOL) 650 MG rectal suppository 650 mg, 650 mg, Rectal, Q4H PRN  Labetalol HCl (TRANDATE) injection 10 mg, 10 mg, Intravenous, Q10 Min WV Gabapentin              ITCHING        Lab Results   Component Value Date    PGLU 220 05/28/2021       Review of Systems:  Complete review of systems completed/14 point.   Negative except as that outlined in HPI    OBJECTIVE:    Blood pressure 149/87, pulse Assessment:                                Rehab diagnosis: ADL and  mobility dysfunction due to Acute CVA L hemisphere    Disposition:     Based on pt's current functional and medical status, Acute inpatient rehabilitation and fall with hit head and no LOC. Pt on stroke protocol and is r/o seizure.       Therapy significant imaging (date, test, result):  EEG -   Normal EEG wake and sleep     CT Brain -   1. There are chronic microvascular ischemic changes noted bilaterally. SUBJECTIVE  Pt reported of back pain. Patient’s self-stated goal is to get well. OBJECTIVE[BR.1]  Precautions: Aspiration; Seizure;Bed/chair alarm; Other (Comment) (SBP<180)[BR.2]    WEIGHT BEARING RESTRICTION[BR.1]  Weight Bearing Restriction: N of hand placement on stable surface, proper body mechanics to facilitate trunk movement and R quad support, completed with mod assist.   Pt ambulated 25' with RW with max assist and chair follow.  Step by step for step sequencing, manual guidance for motor demonstrate transfers EOB to/from chair at assistance level: supervision   Goal #5     Goal #6     Goal Comments: Goals established on 5/27/2021[BR.1]       Attribution Zhou    BR. 1 - Francheska Anderson, PTA on 5/28/2021  4:12 PM  BR. 2 - Francheska Anderson, hospitalizations of past 6 months):  Here 2/2021 - chest pain, syncopal episode with hit head - D/C rec was home with 24 hour care/supervision    Problem List  Principal Problem:    Cerebrovascular accident (CVA), unspecified mechanism ([facility] [address]  Active Proble responses to stimuli  · Attention Span:  appears intact and somes distracted  · Following Commands:  follows one step commands without difficulty  · Motor Planning: impaired  · Awareness of Errors:  good awareness of errors made  · Awareness of Deficits: oxygen flow (liters per minute): 2  Ambulation oxygen flow (liters per minute): 2    AM-PAC '6-Clicks' INPATIENT SHORT FORM - BASIC MOBILITY  How much difficulty does the patient currently have. ..  -   Turning over in bed (including adjusting bedclothes, s dizziness and BP checked, dizziness improved but was not gone with increased time. Pt required CGA and use of bed rail to scoot to EOB. Pt initially required CGA for sitting balance, progressed to supervision.  Sensation, strength, and coordination assessed overall the evaluation complexity is considered moderate. These impairments and comorbidities manifest themselves as functional limitations in independent bed mobility, transfers, and gait.   The patient is below baseline and would benefit from skilled inp Presenting Problem: CVA    Physician Order: IP Consult to Occupational Therapy  Reason for Therapy: ADL/IADL Dysfunction and Discharge Planning    History related to current admission:[MM.1]   Admitted from home with R sided weakness. DX[MM. 2] with small Occupation/Status:  (retired)  Hand Dominance: Right  Drives: Yes  Patient Regularly Uses: Reading glasses    Prior Level of Function:[MM.1]   Mod I with self care . Lives with her daughter in a multi level townhouse. Completes IADLS, drives.  Uses a walkin - Finger to Nose;Coordination - Finger Opposition  Coordination - Finger to Nose: Right decreased speed;Right decreased accuracy     Coordination - Finger Opposition: Right decreased speed;Right decreased accuracy       ACTIVITY TOLERANCE with facilitation for RUE wrist extension provided. Patient performed 10 reps finger, wrist ad elbow flexion with supervision. Patient PCT and RN aware of session, chair alarm on , needs met. [MM.2]    Patient End of Session: Up in chair; With [facility] staff;Pietro Decision Making[MM. 1] LOW - Analysis of occupational profile, problem-focused assessments, limited treatment options[MM. 2]    Overall Complexity[MM. 1] MODERATE[MM.2]     OT Discharge Recommendations: Acute rehabilitation       PLAN  OT Treatment Plan: Julee Gitelman 2:29 PM Status: Signed    : Lu Montiel SLP (SPEECH-LANGUAGE PATHOLOGIST)       SPEECH DAILY NOTE - INPATIENT    ASSESSMENT & PLAN   ASSESSMENT  Pt seen for dysphagia tx to assess tolerance with recommended diet, ensure proper utilization of a FOLLOW UP[CJ.1]  Follow Up Needed (Documentation Required): Yes  SLP Follow-up Date: 05/29/21  Number of Visits to Meet Established Goals:[CJ.2] 2    Session: 1 of 2    If you have any questions, please contact [address], [place], CCC-SLP  Keena

## (undated) NOTE — IP AVS SNAPSHOT
[address]            (For Outpatient Use Only) Initial Admit Date: 5/26/2021   Inpt/Obs Admit Date: Inpt: 5/27/21 / Obs: N/A   Discharge Date:    Sukhdev Pouch:  [de-identified]   MRN: [de-identified]   CSN: [id number]   CEID: [id number] Pt Rel to Subscriber: SELF   TERTIARY INSURANCE   Payor:  Plan:    Group Number:  Insurance Type:    Subscriber Name:  Subscriber :    Subscriber ID:  Pt Rel to Subscriber:    Hospital Account Financial Class: Medicare    May 30, 2021

## (undated) NOTE — LETTER
Date: 3/30/2022    Patient Name: Samina Suh          To Whom it may concern: This letter has been written at [facility] request. The above patient was seen at the [facility] for treatment of a serious medical condition. This patient should be excused from attending work from 3/27/22 until further notice.           Sincerely,    Earl Melendez

## (undated) NOTE — ED AVS SNAPSHOT
Tess Frye   MRN: [id number]    Department:  [facility] Emergency Department   Date of Visit:  3/1/2019           Disclosure     Insurance plans vary and the physician(s) referred by the ER may not be covered by your plan.  Please contact yo tell this physician (or your personal doctor if your instructions are to return to your personal doctor) about any new or lasting problems. The primary care or specialist physician will see patients referred from the [facility] Emergency Department.  Anu Perez

## (undated) NOTE — Clinical Note
Your patient was recently seen at the [facility] for a hospital follow-up visit. The visit note is attached. Please contact the clinic with any questions at [phone].     Thank you,  DAVIDA Glover

## (undated) NOTE — IP AVS SNAPSHOT
Patient Demographics     Address  [address] [place] [address] Phone  [phone] (Home) *Preferred*  [phone] (Work)  [phone] [facility])      Emergency Contact(s)     Name Relation Home Work [address] [facility] Daughter [phone]- public transportation, ridesharing, or taxis. 2. Monitor your symptoms carefully. If your symptoms get worse, call your healthcare provider immediately. 3. Get rest and stay hydrated.    4. If you have a medical appointment, call the healthcare provider you can do to manage your health at home, [email or web address] Del [email or web address]  [email or web address]  [email or web address] Jacinta Riggins  Next dose due: 12/22/21 9AM      Take 1 mg by mouth daily. gabapentin 300 MG Caps  Commonly known as: NEURONTIN  Next dose due: 12/21/21 9PM      Take 1 capsule (300 mg total) by mouth 3 (three) times daily.    Isidoro Waldrop MD         g Take 1 tablet by mouth daily. Tiotropium Bromide Monohydrate 2.5 MCG/ACT Aers  Commonly known as: Spiriva Respimat  Next dose due: 12/22/21 9AM      Inhale 2 puffs into the lungs daily.    Frank Hamman, APN Sanda Hans 306-30.5-7 200 mg 12/21/21 0521 Given      [id number] clopidogrel (PLAVIX) tab 75 mg 12/21/21 0802 Given      [id number] dextromethorphan-guaiFENesin (ROBITUSSIN-DM)  MG/5ML liquid 5 mL 12/21/21 0521 Given      [id number] fluticasone-umeclidin-vilant (Amelia Og Patient's Most Recent Weight       Most Recent Value   Patient Weight 67.1 kg (148 lb)         Lab Results Last 24 Hours      C-Reactive Protein [[id number]] (Normal)  Resulted: 12/21/21 0758, Result status: Final result   Ordering provider: Maria Esther Lees Components    Component Value Reference Range Flag Lab   Glucose 103 70 - 99 mg/dL H [facility])   Sodium 139 136 - 145 mmol/L — [facility])   Potassium 3.9 3.5 - 5.1 mmol/L — [facility])   Chloride 102 98 - 112 mmol/L — [facility])   C results within the reference range have been shown to contribute to the exclusion of venous thromboembolism with a negative predictive value of approximately 95% when results are used as part of the total clinical evaluation of the patient. ([facility])   Leukocyte Esterase Urine Negative Negative — [facility] ([facility])   WBC Urine None Seen 0 - 5 /HPF — [facility] ([facility])   RBC Urine 0-2 0 - 2 /HPF — [facility])   Bacteria Urine None Seen None Seen /HPF — [facility])   Squamous Epi.  Cells None Type: Physician    Filed: 2021  8:55 AM Date of Service: 2021  5:10 PM Status: Signed    : Beatris Jacobson DO (Physician)         [facility]  History and Physical     Candance Giovanni Patient Status:  Inpatient    packs per day. She has never used smokeless tobacco. She reports current alcohol use of about 1.0 standard drink of alcohol per week. She reports that she does not use drugs.     Family History:   Family History   Problem Relation Age of Onset   • Cancer Fa tablet (25 mg total) by mouth nightly., Disp: 90 tablet, Rfl: 0  amLODIPine Besylate 10 MG Oral Tab, Take 10 mg by mouth daily. , Disp: , Rfl:   hydrochlorothiazide 25 MG Oral Tab, Take 25 mg by mouth daily. , Disp: , Rfl:   lisinopril 40 MG Oral Tab, Take 4 BMI 26.22 kg/m²   General: No acute distress. Alert and oriented x 3. HEENT: Normocephalic atraumatic. Moist mucous membranes. EOM-I. PERRLA. Anicteric. Neck: No lymphadenopathy. No JVD. No carotid bruits.   Respiratory: Clear to auscultation bilaterall active wheezing. Will continue decadron   - Will continue long acting inhalers. #Hypertension   - Will continue amlodipine, hydrochlorothiazide, metoprolol and lisinopril. #GERD   - Will continue on PPI.     #Hyperlipidemia   -We will continue statin th Consultation:  [id number]  History of Present Illness:  Sally Renteria is a a(n) 61year old female unvaccinated for COVID-19. She reports cough and chest pain back pain, unclear duration.  She has underlying asthma. 02 sat was 98% and was given Reg COV m chloride 0.9% IV bolus 500 mL, 500 mL, Intravenous, Once **FOLLOWED BY** 0.9% NaCl infusion, , Intravenous, Continuous  •  sodium chloride 0.9% IV bolus 500 mL, 500 mL, Intravenous, PRN  •  guaiFENesin ER (MUCINEX) 12 hr tab 600 mg, 600 mg, Oral, BID  •  a (PROTONIX) EC tab 40 mg, 40 mg, Oral, QAM AC  •  Umeclidinium Bromide (INCRUSE ELLIPTA) 62.5 MCG/INH inhaler 1 puff, 1 puff, Inhalation, Daily  •  glucose (DEX4) oral liquid 15 g, 15 g, Oral, Q15 Min PRN **OR** glucose-vitamin C (DEX-4) chewable tab 4 tabl times daily. , Disp: 90 capsule, Rfl: 0  Amitriptyline HCl 25 MG Oral Tab, Take 1 tablet (25 mg total) by mouth nightly., Disp: 90 tablet, Rfl: 0  amLODIPine Besylate 10 MG Oral Tab, Take 10 mg by mouth daily. , Disp: , Rfl:   hydrochlorothiazide 25 MG Oral signs: Temp:  [97.7 °F (36.5 °C)-99.1 °F (37.3 °C)] 98.5 °F (36.9 °C)  Pulse:  [] 82  Resp:  [17-28] 17  BP: (106-139)/() 110/88  HEENT: Moist mucous membranes. Extraocular muscles are intact.   Neck: No swelling, no masses  Respiratory: Non lab abuse     Community acquired pneumonia of left lower lobe of lung     Cervical radiculopathy     Hypernatremia     Chest pain radiating to arm     Syncope and collapse     Chronic midline posterior neck pain     MIKE (acute kidney injury) ([facility] [address]     Peyton Shretsha supplemental o2, pain management   - See additional Care Plan goals for specific interventions   Patient/Family Short Term Goal     Interdisciplinary Progressing    Description: Patient's Short Term Goal: pain relief   12/19 NOC: pain relief, sleep    Inte

## (undated) NOTE — LETTER
Juli Hernandez [address] [facility], [address] [place] [address]  1. Authorization for Surgical Operation and Procedure   Date:3/30/22                                                                                          Time:1500  2. I hereby authorize Destiny Dominguez, my physician and his/her assistants (if applicable), which may include medical students, residents, and/or fellows, to perform the following surgical operation/ procedure and administer such anesthesia as may be determined necessary by my physician:  Operation/Procedure name (s) Hemodialysis catheter placement  on Zayra Vergara   2. I recognize that during the surgical operation/procedure, unforeseen conditions may necessitate additional or different procedures than those listed above. I, therefore, further authorize and request that the above-named surgeon, assistants, or designees perform such procedures as are, in their judgment, necessary and desirable. 3.   My surgeon/physician has discussed prior to my surgery the potential benefits, risks and side effects of this procedure; the likelihood of achieving goals; and potential problems that might occur during recuperation. They also discussed reasonable alternatives to the procedure, including risks, benefits, and side effects related to the alternatives and risks related to not receiving this procedure. I have had all my questions answered and I acknowledge that no guarantee has been made as to the result that may be obtained. 4.   Should the need arise during my operation or immediate post-operative period, I also consent to the administration of blood and/or blood products. Further, I understand that despite careful testing and screening of blood or blood products by collecting agencies, I may still be subject to ill effects as a result of receiving a blood transfusion and/or blood products.   The following are some, but not all, of the potential risks that can occur: fever and allergic reactions, hemolytic reactions, transmission of diseases such as Hepatitis, AIDS and Cytomegalovirus (CMV) and fluid overload. In the event that I wish to have an autologous transfusion of my own blood, or a directed donor transfusion. I will discuss this with my physician. 5.   I authorize the use of any specimen, organs, tissues, body parts or foreign objects that may be removed from my body during the operation/procedure for diagnosis, research or teaching purposes and their subsequent disposal by hospital authorities. I also authorize the release of specimen test results and/or written reports to my treating physician on the hospital medical staff or other referring or consulting physicians involved in my care, at the discretion of the Pathologist or my treating physician. 6.   I consent to the photographing or videotaping of the operations or procedures to be performed, including appropriate portions of my body for medical, scientific, or educational purposes, provided my identity is not revealed by the pictures or by descriptive texts accompanying them. If the procedure has been photographed/videotaped, the surgeon will obtain the original picture, image, videotape or CD. The hospital will not be responsible for storage, release or maintenance of the picture, image, tape or CD.    7.   I consent to the presence of a  or observers in the operating room as deemed necessary by my physician or their designees. 8.   I recognize that in the event my procedure results in extended X-Ray/fluoroscopy time, I may develop a skin reaction. 9. If I have a Do Not Attempt Resuscitation (DNAR) order in place, that status will be suspended while in the operating room, procedural suite, and during the recovery period unless otherwise explicitly stated by me (or a person authorized to consent on my behalf).  The surgeon or my attending physician will determine when the applicable recovery period ends for purposes of reinstating the DNAR order. 10. Patients having a sterilization procedure: I understand that if the procedure is successful the results will be permanent and it will therefore be impossible for me to inseminate, conceive, or bear children. I also understand that the procedure is intended to result in sterility, although the result has not been guaranteed. 11. I acknowledge that my physician has explained sedation/analgesia administration to me including the risk and benefits I consent to the administration of sedation/analgesia as may be necessary or desirable in the judgment of my physician.     I CERTIFY THAT I HAVE READ AND FULLY UNDERSTAND THE ABOVE CONSENT TO OPERATION and/or OTHER PROCEDURE.      _________________________________________  __________________________________  Signature of Patient     Signature of Responsible Person         ___________________________________         Printed Name of Responsible Person           _________________________________                 Relationship to Patient  _________________________________________  ______________________________  Signature of Witness          Date  Time          Patient Name: Rodriguez Vizcaino     : 1955                 Printed: 2022     Medical Record #: [id number]                                            Page 1 of 1

## (undated) NOTE — IP AVS SNAPSHOT
[address]            (For Outpatient Use Only) Initial Admit Date: 12/23/2021   Inpt/Obs Admit Date: Inpt: 12/23/21 / Obs: 12/24/21   Discharge Date:    Ed Taveras:  [de-identified]   MRN: [de-identified]   CSN: [id number]   CEID: [id number] Subscriber: SELF   TERTIARY INSURANCE   Payor:  Plan:    Group Number:  Insurance Type:    Subscriber Name:  Subscriber :    Subscriber ID:  Pt Rel to Subscriber:    Hospital Account Financial Class: Medicare    2021

## (undated) NOTE — IP AVS SNAPSHOT
[address]            (For Outpatient Use Only) Initial Admit Date: 1/24/2018   Inpt/Obs Admit Date: Inpt: 1/24/18 / Obs: N/A   Discharge Date:    Carissa Carrasquillo:  [de-identified]   MRN: [de-identified]   CSN: [id number]        MIN Subscriber ID:  Pt Rel to Subscriber:    Hospital Account Financial Class: Medicare    February 1, 2018

## (undated) NOTE — IP AVS SNAPSHOT
Patient Demographics     Address  [address] Phone  [phone] [facility])  [phone] [facility]      Emergency Contact(s)     Name Relation Home Work ZEB Daughter [phone]      [facility] Mother 70 Fluticasone Propionate 50 MCG/ACT Susp  Commonly known as:  FLONASE      2 sprays by Each Nare route daily. gabapentin 100 MG Caps  Commonly known as:  NEURONTIN      Take 1 capsule (100 mg total) by mouth 3 (three) times daily.    Wellington Guardado Metoprolol Tartrate 75 MG Tabs           6167-7104-A - MAR ACTION REPORT  (last 24 hrs)    ** SITE UNKNOWN **     Order ID Medication Name Action Time Action Reason Comments    [id number] ALPRAZolam Herber Krueger) tab 0.25 mg 01/31/18 1732 Given      [id number] Alb Pulse  113 Filed at 02/01/2018 1157   Resp  18 Filed at 02/01/2018 1157   Temp  98.8 °F (37.1 °C) Filed at 02/01/2018 1157   SpO2  94 % Filed at 02/01/2018 1157      Patient's Most Recent Weight    Flowsheet Row Most Recent Value   Patient Weight  67.2 kg Component Value Reference Range Flag Lab   Glucose 99 70 - 99 mg/dL — [facility]   BUN 22 8 - 20 mg/dL [facility]   Creatinine 0.61 0.55 - 1.02 mg/dL [facility]    >=60 — [facility]   Comment:           Estimated GFR units: mL/min/1.73 square me 01/31/18 1900    Specimen:  Blood from Bld,Picc Line      Blood Culture Result No Growth 2 Days    Blood Culture Once [[id number]] Collected:  01/24/18 2316    Order Status:  Completed Lab Status:  Final result Updated:  01/30/18 0000    Specimen:  Blood fr Coronavirus Nl63 PCR: Negative     Coronavirus Oc43 PCR: Negative     Metapneumovirus PCR: Negative     Rhinovirus/Entero PCR: Negative     Influenza A H3 PCR: Positive (A)     Influenza B PCR: Negative     Parainfluenza 1 PCR: Negative     Parainlfuenza • Fibroids    • Other and unspecified hyperlipidemia    • Stroke [facility])    • Unspecified essential hypertension         Past Surgical History: Past Surgical History:  No date: TOTAL ABDOM HYSTERECTOMY    Social History:  reports that she has been smoking. HYDROcodone-acetaminophen (NORCO)  MG Oral Tab Take 1 tablet by mouth once daily. Disp:  Rfl:    aspirin 325 MG Oral Tab EC Take 325 mg by mouth daily. Disp:  Rfl:    lisinopril (PRINIVIL,ZESTRIL) 40 MG Oral Tab Take 40 mg by mouth daily.  Disp:  Rfl: ASSESSMENT / PLAN:     1. Acute hypoxic respiratory failure: Secondary to influenza, continue with steroids, Tamiflu, nebs. 2. Acute asthma exacerbation: Pulmonary consultation, nebs, steroids  3. Influenza  4.  Constipation    Quality:  · DVT Prophylaxis: 2) She is NPO. Agree with Ativan 0.5mg IV every 4hr prn[RH.2] ANXIETY[RH.3] for now. Use sparingly. 3) Wean down precedex.      4)[RH.2] For agitation, can use Seroquel 12.5mg twice a day as needed for agitation/psychosis or Haldol 0.5mg IV every 4hrs p Current Facility-Administered Medications:   •  LORazepam (ATIVAN) 2 MG/ML injection, , ,   •  hydrALAzine HCl (APRESOLINE) injection 10 mg, 10 mg, Intravenous, Q4H PRN  •  hydrALAzine HCl (APRESOLINE) 20 MG/ML injection, , ,   •  niCARdipine HCl (CARDENE) PRN **OR** acetaminophen (TYLENOL) 650 MG rectal suppository 650 mg, 650 mg, Rectal, Q6H PRN  •  PEG 3350 (MIRALAX) powder packet 17 g, 17 g, Oral, Daily PRN  •  magnesium hydroxide (MILK OF MAGNESIA) 400 MG/5ML suspension 30 mL, 30 mL, Oral, Daily PRN  • HGB 12.2 01/29/2018   HCT 37.5 01/29/2018   .0 01/29/2018   CREATSERUM 0.59 01/29/2018   BUN 25 01/29/2018    01/29/2018   K 4.5 01/29/2018    01/29/2018   CO2 34.0 01/29/2018    01/29/2018   CA 8.2 01/29/2018   ALB 2.9 01/29/2018 Diagnosis Date   • Alcohol abuse    • Anxiety    • Bipolar affective ([facility] [address]    • Depression    • Extrinsic asthma, unspecified    • Fibroids    • Other and unspecified hyperlipidemia    • Stroke [facility])    • Unspecified essential hypertension[KT. 2]        Pas blankets)?: A Little   -   Sitting down on and standing up from a chair with arms (e.g., wheelchair, bedside commode, etc.): A Lot   -   Moving from lying on back to sitting on the side of the bed?: A Lot[KT. 2]   How much help from another person does the and this patient is demonstrating a 68.66% degree of impairment in mobility.  Research supports that patients with this level of impairment may benefit from a SNF, however based on patients PLOF, she is more appropriate for [facility]UN[facility] to work towards increasing in Author:  Hyun Elizabeth OT Service:  (none) Author Type:  Occupational Therapist    Filed:  1/31/2018  1:10 PM Date of Service:  1/31/2018 12:52 PM Status:  Signed    :  Hyun Elizabeth OT (Occupational Therapist)       OCCUPATIONAL THERAPY E the emergency department with complaints of shortness of breath and cough. Patient states that she has been having increasing dyspnea over the past few days. She states she has been very constipated.   She has not had any significant purulent sputum, she Proprioception:  impaired    Communication: no issues noted, able to make needs known    Behavioral/Emotional/Social: Pt was congenial, appropriate and amenable to evaluation    RANGE OF MOTION AND STRENGTH ASSESSMENT  Upper extremity ROM is NOT within fun D/C plan. Therapist completed assessment of current function related to mobility and ADL's. Bed mobility at Mod A x2. Pt sat at EOB with varying levels of assist form S to Max A with posterior lean and phys/veral cues to correct to midline and posture. maximizing patient’s ability to return safely to her prior level of function.     Patient Complexity  Occupational Profile/Medical History HIGH - Extensive review of history including review of medical or therapy records    Specific performance deficits imp Author:  DANIEL Elise Service:  Rehab Author Type:  SPEECH-LANGUAGE PATHOLOGIST    Filed:  2/1/2018 10:45 AM Date of Service:  2/1/2018 10:43 AM Status:  Signed    :  DANIEL Elise (SPEECH-LANGUAGE PATHOLOGIST)       SPEECH DAILY NOTE - INPAT Electronically signed by DANIEL Rea on 2/1/2018 10:45 AM   Attribution Zhou    LO. 1 - DANIEL Rea on 2/1/2018 10:43 AM